# Patient Record
Sex: FEMALE | Race: WHITE | NOT HISPANIC OR LATINO | Employment: OTHER | URBAN - METROPOLITAN AREA
[De-identification: names, ages, dates, MRNs, and addresses within clinical notes are randomized per-mention and may not be internally consistent; named-entity substitution may affect disease eponyms.]

---

## 2017-06-19 RX ORDER — FUROSEMIDE 40 MG/1
TABLET ORAL
Qty: 180 TABLET | Refills: 0 | OUTPATIENT
Start: 2017-06-19

## 2017-07-07 RX ORDER — FUROSEMIDE 40 MG/1
TABLET ORAL
Qty: 180 TABLET | Refills: 0 | OUTPATIENT
Start: 2017-07-07

## 2017-08-17 ENCOUNTER — TRANSCRIBE ORDERS (OUTPATIENT)
Dept: ADMINISTRATIVE | Facility: HOSPITAL | Age: 82
End: 2017-08-17

## 2017-08-17 DIAGNOSIS — R06.00 DYSPNEA, UNSPECIFIED TYPE: Primary | ICD-10-CM

## 2017-08-21 ENCOUNTER — HOSPITAL ENCOUNTER (OUTPATIENT)
Dept: CARDIOLOGY | Facility: HOSPITAL | Age: 82
Discharge: HOME | End: 2017-08-21
Attending: INTERNAL MEDICINE

## 2017-08-21 ENCOUNTER — HOSPITAL ENCOUNTER (OUTPATIENT)
Dept: PULMONOLOGY | Facility: HOSPITAL | Age: 82
Discharge: HOME | End: 2017-08-21
Attending: INTERNAL MEDICINE | Admitting: INTERNAL MEDICINE

## 2017-08-21 ENCOUNTER — TRANSCRIBE ORDERS (OUTPATIENT)
Dept: ADMINISTRATIVE | Facility: HOSPITAL | Age: 82
End: 2017-08-21

## 2017-08-21 ENCOUNTER — HOSPITAL ENCOUNTER (OUTPATIENT)
Dept: GENERAL RADIOLOGY | Facility: HOSPITAL | Age: 82
Discharge: HOME | End: 2017-08-21
Attending: INTERNAL MEDICINE

## 2017-08-21 VITALS
SYSTOLIC BLOOD PRESSURE: 114 MMHG | HEART RATE: 71 BPM | BODY MASS INDEX: 29.86 KG/M2 | OXYGEN SATURATION: 92 % | DIASTOLIC BLOOD PRESSURE: 64 MMHG | WEIGHT: 197 LBS | HEIGHT: 68 IN

## 2017-08-21 VITALS — OXYGEN SATURATION: 94 % | HEART RATE: 74 BPM

## 2017-08-21 DIAGNOSIS — R06.00 DYSPNEA, UNSPECIFIED TYPE: Primary | ICD-10-CM

## 2017-08-21 DIAGNOSIS — R06.00 DYSPNEA, UNSPECIFIED TYPE: ICD-10-CM

## 2017-08-21 LAB
BH CV ECHO MEAS - ACS: 1.7 CM
BH CV ECHO MEAS - AO MAX PG (FULL): 2.3 MMHG
BH CV ECHO MEAS - AO MAX PG: 7.1 MMHG
BH CV ECHO MEAS - AO MEAN PG (FULL): 1 MMHG
BH CV ECHO MEAS - AO MEAN PG: 3 MMHG
BH CV ECHO MEAS - AO ROOT AREA (BSA CORRECTED): 1.7
BH CV ECHO MEAS - AO ROOT AREA: 9.6 CM^2
BH CV ECHO MEAS - AO ROOT DIAM: 3.5 CM
BH CV ECHO MEAS - AO V2 MAX: 133 CM/SEC
BH CV ECHO MEAS - AO V2 MEAN: 84 CM/SEC
BH CV ECHO MEAS - AO V2 VTI: 28 CM
BH CV ECHO MEAS - AVA(I,A): 2.7 CM^2
BH CV ECHO MEAS - AVA(I,D): 2.7 CM^2
BH CV ECHO MEAS - AVA(V,A): 2.6 CM^2
BH CV ECHO MEAS - AVA(V,D): 2.6 CM^2
BH CV ECHO MEAS - BSA(HAYCOCK): 2.1 M^2
BH CV ECHO MEAS - BSA: 2 M^2
BH CV ECHO MEAS - BZI_BMI: 30 KILOGRAMS/M^2
BH CV ECHO MEAS - BZI_METRIC_HEIGHT: 172.7 CM
BH CV ECHO MEAS - BZI_METRIC_WEIGHT: 89.4 KG
BH CV ECHO MEAS - CONTRAST EF (2CH): 58.4 ML/M^2
BH CV ECHO MEAS - CONTRAST EF 4CH: 61.1 ML/M^2
BH CV ECHO MEAS - EDV(CUBED): 144.7 ML
BH CV ECHO MEAS - EDV(MOD-SP2): 86.3 ML
BH CV ECHO MEAS - EDV(MOD-SP4): 92.2 ML
BH CV ECHO MEAS - EDV(TEICH): 132.4 ML
BH CV ECHO MEAS - EF(CUBED): 62.7 %
BH CV ECHO MEAS - EF(MOD-SP2): 58.4 %
BH CV ECHO MEAS - EF(MOD-SP4): 61.1 %
BH CV ECHO MEAS - EF(TEICH): 53.8 %
BH CV ECHO MEAS - ESV(CUBED): 54 ML
BH CV ECHO MEAS - ESV(MOD-SP2): 35.9 ML
BH CV ECHO MEAS - ESV(MOD-SP4): 35.9 ML
BH CV ECHO MEAS - ESV(TEICH): 61.2 ML
BH CV ECHO MEAS - FS: 28 %
BH CV ECHO MEAS - IVS/LVPW: 1.1
BH CV ECHO MEAS - IVSD: 0.69 CM
BH CV ECHO MEAS - LA DIMENSION: 3.9 CM
BH CV ECHO MEAS - LA/AO: 1.1
BH CV ECHO MEAS - LAT PEAK E' VEL: 9 CM/SEC
BH CV ECHO MEAS - LV DIASTOLIC VOL/BSA (35-75): 45.4 ML/M^2
BH CV ECHO MEAS - LV MASS(C)D: 117 GRAMS
BH CV ECHO MEAS - LV MASS(C)DI: 57.6 GRAMS/M^2
BH CV ECHO MEAS - LV MAX PG: 4.8 MMHG
BH CV ECHO MEAS - LV MEAN PG: 2 MMHG
BH CV ECHO MEAS - LV SYSTOLIC VOL/BSA (12-30): 17.7 ML/M^2
BH CV ECHO MEAS - LV V1 MAX: 109 CM/SEC
BH CV ECHO MEAS - LV V1 MEAN: 71.8 CM/SEC
BH CV ECHO MEAS - LV V1 VTI: 23.8 CM
BH CV ECHO MEAS - LVIDD: 5.3 CM
BH CV ECHO MEAS - LVIDS: 3.8 CM
BH CV ECHO MEAS - LVLD AP2: 8.2 CM
BH CV ECHO MEAS - LVLD AP4: 7.7 CM
BH CV ECHO MEAS - LVLS AP2: 7 CM
BH CV ECHO MEAS - LVLS AP4: 6.6 CM
BH CV ECHO MEAS - LVOT AREA (M): 3.1 CM^2
BH CV ECHO MEAS - LVOT AREA: 3.1 CM^2
BH CV ECHO MEAS - LVOT DIAM: 2 CM
BH CV ECHO MEAS - LVPWD: 0.64 CM
BH CV ECHO MEAS - MED PEAK E' VEL: 9 CM/SEC
BH CV ECHO MEAS - MR MAX PG: 38.9 MMHG
BH CV ECHO MEAS - MR MAX VEL: 312 CM/SEC
BH CV ECHO MEAS - MV A DUR: 0.18 SEC
BH CV ECHO MEAS - MV A MAX VEL: 87.3 CM/SEC
BH CV ECHO MEAS - MV DEC SLOPE: 593 CM/SEC^2
BH CV ECHO MEAS - MV DEC TIME: 0.24 SEC
BH CV ECHO MEAS - MV E MAX VEL: 116 CM/SEC
BH CV ECHO MEAS - MV E/A: 1.3
BH CV ECHO MEAS - MV MAX PG: 9.7 MMHG
BH CV ECHO MEAS - MV MEAN PG: 3 MMHG
BH CV ECHO MEAS - MV P1/2T MAX VEL: 148 CM/SEC
BH CV ECHO MEAS - MV P1/2T: 73.1 MSEC
BH CV ECHO MEAS - MV V2 MAX: 156 CM/SEC
BH CV ECHO MEAS - MV V2 MEAN: 80.2 CM/SEC
BH CV ECHO MEAS - MV V2 VTI: 41.6 CM
BH CV ECHO MEAS - MVA P1/2T LCG: 1.5 CM^2
BH CV ECHO MEAS - MVA(P1/2T): 3 CM^2
BH CV ECHO MEAS - MVA(VTI): 1.8 CM^2
BH CV ECHO MEAS - PA ACC TIME: 0.12 SEC
BH CV ECHO MEAS - PA MAX PG (FULL): 4.2 MMHG
BH CV ECHO MEAS - PA MAX PG: 5.9 MMHG
BH CV ECHO MEAS - PA PR(ACCEL): 25 MMHG
BH CV ECHO MEAS - PA V2 MAX: 121 CM/SEC
BH CV ECHO MEAS - PULM A REVS DUR: 0.22 SEC
BH CV ECHO MEAS - PULM A REVS VEL: 28.7 CM/SEC
BH CV ECHO MEAS - PULM DIAS VEL: 58.3 CM/SEC
BH CV ECHO MEAS - PULM S/D: 1
BH CV ECHO MEAS - PULM SYS VEL: 58.8 CM/SEC
BH CV ECHO MEAS - PVA(V,A): 2.2 CM^2
BH CV ECHO MEAS - PVA(V,D): 2.2 CM^2
BH CV ECHO MEAS - QP/QS: 0.71
BH CV ECHO MEAS - RAP SYSTOLE: 3 MMHG
BH CV ECHO MEAS - RV MAX PG: 1.7 MMHG
BH CV ECHO MEAS - RV MEAN PG: 1 MMHG
BH CV ECHO MEAS - RV V1 MAX: 64.3 CM/SEC
BH CV ECHO MEAS - RV V1 MEAN: 43.6 CM/SEC
BH CV ECHO MEAS - RV V1 VTI: 12.7 CM
BH CV ECHO MEAS - RVOT AREA: 4.2 CM^2
BH CV ECHO MEAS - RVOT DIAM: 2.3 CM
BH CV ECHO MEAS - RVSP: 35.5 MMHG
BH CV ECHO MEAS - SI(AO): 132.7 ML/M^2
BH CV ECHO MEAS - SI(CUBED): 44.7 ML/M^2
BH CV ECHO MEAS - SI(LVOT): 36.8 ML/M^2
BH CV ECHO MEAS - SI(MOD-SP2): 24.8 ML/M^2
BH CV ECHO MEAS - SI(MOD-SP4): 27.7 ML/M^2
BH CV ECHO MEAS - SI(TEICH): 35.1 ML/M^2
BH CV ECHO MEAS - SV(AO): 269.4 ML
BH CV ECHO MEAS - SV(CUBED): 90.7 ML
BH CV ECHO MEAS - SV(LVOT): 74.8 ML
BH CV ECHO MEAS - SV(MOD-SP2): 50.4 ML
BH CV ECHO MEAS - SV(MOD-SP4): 56.3 ML
BH CV ECHO MEAS - SV(RVOT): 52.8 ML
BH CV ECHO MEAS - SV(TEICH): 71.2 ML
BH CV ECHO MEAS - TAPSE (>1.6): 2.4 CM2
BH CV ECHO MEAS - TR MAX VEL: 285 CM/SEC
BH CV VAS BP RIGHT ARM: NORMAL MMHG
BH CV XLRA - RV BASE: 3.9 CM
BH CV XLRA - RV LENGTH: 7.1 CM
BH CV XLRA - RV MID: 3.5 CM
BH CV XLRA - TDI S': 11 CM/SEC
E/E' RATIO: 13
LEFT ATRIUM VOLUME INDEX: 31 ML/M2
LEFT ATRIUM VOLUME: 57 CM3
LV EF 2D ECHO EST: 61 %

## 2017-08-21 PROCEDURE — 63710000001 ALBUTEROL PER 1 MG: Performed by: INTERNAL MEDICINE

## 2017-08-21 PROCEDURE — 0399T HC MYOCARDL STRAIN IMAG QUAN ASSMT PER SESS: CPT

## 2017-08-21 PROCEDURE — 93306 TTE W/DOPPLER COMPLETE: CPT

## 2017-08-21 PROCEDURE — 94726 PLETHYSMOGRAPHY LUNG VOLUMES: CPT

## 2017-08-21 PROCEDURE — 94060 EVALUATION OF WHEEZING: CPT

## 2017-08-21 PROCEDURE — 0399T ADULT TRANSTHORACIC ECHO COMPLETE: CPT | Performed by: INTERNAL MEDICINE

## 2017-08-21 PROCEDURE — 93306 TTE W/DOPPLER COMPLETE: CPT | Performed by: INTERNAL MEDICINE

## 2017-08-21 PROCEDURE — A9270 NON-COVERED ITEM OR SERVICE: HCPCS | Performed by: INTERNAL MEDICINE

## 2017-08-21 PROCEDURE — 71020 HC CHEST PA AND LATERAL: CPT

## 2017-08-21 RX ORDER — CITALOPRAM 20 MG/1
20 TABLET ORAL DAILY
COMMUNITY

## 2017-08-21 RX ORDER — IPRATROPIUM BROMIDE AND ALBUTEROL SULFATE 2.5; .5 MG/3ML; MG/3ML
3 SOLUTION RESPIRATORY (INHALATION)
COMMUNITY
End: 2019-05-29 | Stop reason: ALTCHOICE

## 2017-08-21 RX ORDER — SENNOSIDES 8.6 MG
650 CAPSULE ORAL EVERY 8 HOURS PRN
COMMUNITY

## 2017-08-21 RX ORDER — ALBUTEROL SULFATE 2.5 MG/3ML
2.5 SOLUTION RESPIRATORY (INHALATION) EVERY 4 HOURS PRN
COMMUNITY
End: 2019-05-29 | Stop reason: ALTCHOICE

## 2017-08-21 RX ORDER — GUAIFENESIN 400 MG/1
400 TABLET ORAL
COMMUNITY
End: 2019-05-29 | Stop reason: ALTCHOICE

## 2017-08-21 RX ORDER — ALBUTEROL SULFATE 2.5 MG/3ML
2.5 SOLUTION RESPIRATORY (INHALATION) ONCE
Status: COMPLETED | OUTPATIENT
Start: 2017-08-21 | End: 2017-08-21

## 2017-08-21 RX ORDER — BUDESONIDE 1 MG/2ML
1 INHALANT ORAL 2 TIMES DAILY
COMMUNITY
End: 2019-05-29 | Stop reason: ALTCHOICE

## 2017-08-21 RX ADMIN — ALBUTEROL SULFATE 2.5 MG: 2.5 SOLUTION RESPIRATORY (INHALATION) at 13:16

## 2018-05-03 ENCOUNTER — OFFICE VISIT (OUTPATIENT)
Dept: CARDIOLOGY | Facility: CLINIC | Age: 83
End: 2018-05-03

## 2018-05-03 VITALS
HEIGHT: 68 IN | DIASTOLIC BLOOD PRESSURE: 80 MMHG | BODY MASS INDEX: 30.77 KG/M2 | WEIGHT: 203 LBS | SYSTOLIC BLOOD PRESSURE: 120 MMHG | HEART RATE: 75 BPM

## 2018-05-03 DIAGNOSIS — R06.02 SOB (SHORTNESS OF BREATH): ICD-10-CM

## 2018-05-03 DIAGNOSIS — I25.118 CORONARY ARTERY DISEASE INVOLVING NATIVE CORONARY ARTERY OF NATIVE HEART WITH OTHER FORM OF ANGINA PECTORIS (HCC): ICD-10-CM

## 2018-05-03 DIAGNOSIS — I10 ESSENTIAL HYPERTENSION: ICD-10-CM

## 2018-05-03 DIAGNOSIS — R07.2 PRECORDIAL PAIN: Primary | ICD-10-CM

## 2018-05-03 PROCEDURE — 93000 ELECTROCARDIOGRAM COMPLETE: CPT | Performed by: INTERNAL MEDICINE

## 2018-05-03 PROCEDURE — 99204 OFFICE O/P NEW MOD 45 MIN: CPT | Performed by: INTERNAL MEDICINE

## 2018-05-03 NOTE — PROGRESS NOTES
Subjective:     Encounter Date:05/03/2018      Patient ID: Eduarda Lopez is a 86 y.o. female.    Chief Complaint: SOB, CP  History of Present Illness    Dear Lisseth,    I had the pleasure seeing this patient in the office today for initial evaluation and consultation.  I appreciate you sent her to see us.    She has a history of CAD and heart failure.  Apparently I saw her many years ago, although I do not have any records in no independent recollection of her history.  She apparently saw Dr. Moeller also years ago.  We currently do not have any of those records.    She's been having episodes of increasing shortness breath with fairly minimal activity.  She walks with a walker.  She walks very slowly.  She has not had any recent hospitalizations or evaluations.  Sometimes she does get mid precordial chest discomfort when she gets the shortness of breath.  If she gets chest discomfort it does not radiate.  No associated nausea or vomiting.  No associated diaphoresis.  She has not had any sensation of palpitations or heart racing.  No presyncope or syncope.  She has had episodes of dizziness when she stands up fast.  She is also noted some lower extremity edema.  It's better when she keeps her feet propped up most of the day.  Recently her assist chair was broken so she could not elevate her feet and by the end of the afternoon she would have fairly significant lower extremity edema.  She's had very low fatigue.    Last year she had an echocardiogram performed that showed diastolic dysfunction with grade 2 diastolic dysfunction.  Systolic function was normal.  She doesn't recall why that echocardiogram was ordered.    The following portions of the patient's history were reviewed and updated as appropriate: allergies, current medications, past family history, past medical history, past social history, past surgical history and problem list.    Past Medical History:   Diagnosis Date   • Asthma    • CHF (congestive  heart failure)    • Disease of thyroid gland    • Fracture of hip    • Hyperlipidemia    • Hypertension    • Thyroid disease        Past Surgical History:   Procedure Laterality Date   • CHOLECYSTECTOMY     • HIP SURGERY     • HYSTERECTOMY     • JOINT REPLACEMENT     • SHOULDER SURGERY     • THYROID SURGERY     • TONSILLECTOMY         Social History     Social History   • Marital status:      Spouse name: N/A   • Number of children: N/A   • Years of education: N/A     Occupational History   • Not on file.     Social History Main Topics   • Smoking status: Former Smoker   • Smokeless tobacco: Not on file      Comment: caff use    • Alcohol use No   • Drug use: No   • Sexual activity: Not on file     Other Topics Concern   • Not on file     Social History Narrative   • No narrative on file       Review of Systems   Constitution: Positive for weakness and malaise/fatigue. Negative for chills, decreased appetite, fever and night sweats.   HENT: Negative for ear discharge, ear pain, hearing loss, nosebleeds and sore throat.    Eyes: Negative for blurred vision, double vision and pain.   Cardiovascular: Negative for cyanosis.   Respiratory: Negative for hemoptysis and sputum production.    Endocrine: Negative for cold intolerance and heat intolerance.   Hematologic/Lymphatic: Negative for adenopathy.   Skin: Negative for dry skin, itching, nail changes, rash and suspicious lesions.   Musculoskeletal: Positive for back pain and joint pain. Negative for arthritis, gout, muscle cramps, muscle weakness, myalgias and neck pain.   Gastrointestinal: Negative for anorexia, bowel incontinence, constipation, diarrhea, dysphagia, hematemesis and jaundice.   Genitourinary: Negative for bladder incontinence, dysuria, flank pain, frequency, hematuria and nocturia.   Neurological: Negative for focal weakness, numbness, paresthesias and seizures.   Psychiatric/Behavioral: Positive for memory loss. Negative for altered mental  "status, hallucinations, hypervigilance, suicidal ideas and thoughts of violence.   Allergic/Immunologic: Negative for persistent infections.         ECG 12 Lead  Date/Time: 5/3/2018 12:29 PM  Performed by: DI LUONG III.  Authorized by: DI LUONG III   Comparison: compared with previous ECG   Similar to previous ECG  Rhythm: sinus rhythm  Rate: normal  Conduction: conduction normal  ST Segments: ST segments normal  T Waves: T waves normal  QRS axis: normal  Other: no other findings  Clinical impression: normal ECG               Objective:     Vitals:    05/03/18 1105   BP: 120/80   Pulse: 75   Weight: 92.1 kg (203 lb)   Height: 172.7 cm (67.99\")         Physical Exam   Constitutional: She is oriented to person, place, and time. She appears well-developed and well-nourished. No distress.   HENT:   Head: Normocephalic and atraumatic.   Nose: Nose normal.   Mouth/Throat: Oropharynx is clear and moist.   Eyes: Conjunctivae and EOM are normal. Pupils are equal, round, and reactive to light. Right eye exhibits no discharge. Left eye exhibits no discharge.   Neck: Normal range of motion. Neck supple. No tracheal deviation present. No thyromegaly present.   Cardiovascular: Normal rate, regular rhythm, S1 normal, S2 normal, normal heart sounds and normal pulses.  Exam reveals no S3.    Pulmonary/Chest: Effort normal and breath sounds normal. No stridor. No respiratory distress. She exhibits no tenderness.   Abdominal: Soft. Bowel sounds are normal. She exhibits no distension and no mass. There is no tenderness. There is no rebound and no guarding.   Musculoskeletal: Normal range of motion. She exhibits no tenderness or deformity.   Lymphadenopathy:     She has no cervical adenopathy.   Neurological: She is alert and oriented to person, place, and time. She has normal reflexes.   Skin: Skin is warm and dry. No rash noted. She is not diaphoretic. No erythema.   Psychiatric: She has a normal mood and affect. Thought " content normal.       Lab Review:             Performed        Assessment:          Diagnosis Plan   1. Precordial pain  Adult Transthoracic Echo Complete W/ Cont if Necessary Per Protocol    Stress Test With Myocardial Perfusion One Day   2. SOB (shortness of breath)  Adult Transthoracic Echo Complete W/ Cont if Necessary Per Protocol    Stress Test With Myocardial Perfusion One Day   3. Coronary artery disease involving native coronary artery of native heart with other form of angina pectoris  Adult Transthoracic Echo Complete W/ Cont if Necessary Per Protocol    Stress Test With Myocardial Perfusion One Day   4. Essential hypertension            Plan:       As patient is having exertional chest pain and exertional shortness of breath.  She's also having lower extremity edema.  She has a prior history of heart failure and CAD, details undefined.  We will arrange for an echocardiogram as well as a Lexiscan Cardiolite.  Further evaluation and treatment will be predicated on the results.  Thank you very much for allowing us to participate in the care of this pleasant patient.  Please don't hesitate to call if I can be of assistance in any way.      Current Outpatient Prescriptions:   •  acetaminophen (TYLENOL) 650 MG 8 hr tablet, Take 650 mg by mouth Every 8 (Eight) Hours As Needed for Mild Pain ., Disp: , Rfl:   •  aspirin 81 MG tablet, Take 81 mg by mouth Daily., Disp: , Rfl:   •  budesonide-formoterol (SYMBICORT) 160-4.5 MCG/ACT inhaler, 2 (two) times a day., Disp: , Rfl:   •  Cetirizine HCl 10 MG capsule, Take 10 mg by mouth Daily., Disp: , Rfl:   •  citalopram (CeleXA) 20 MG tablet, Take 20 mg by mouth Daily., Disp: , Rfl:   •  clonazePAM (KlonoPIN) 0.5 MG tablet, Take 0.5 mg by mouth 2 (Two) Times a Day As Needed., Disp: , Rfl:   •  famotidine (PEPCID) 40 MG tablet, Take 40 mg by mouth Daily., Disp: , Rfl:   •  Formoterol Fumarate (PERFOROMIST IN), Inhale., Disp: , Rfl:   •  furosemide (LASIX) 40 MG tablet,  Take 1 tablet by mouth 2 (Two) Times a Day. (Patient taking differently: Take 40 mg by mouth 2 (Two) Times a Day. 1 tab AM and 0.5 tab at 2 PM daily), Disp: 180 tablet, Rfl: 1  •  levothyroxine (SYNTHROID, LEVOTHROID) 50 MCG tablet, Take 75 mcg by mouth Daily., Disp: , Rfl:   •  Multiple Vitamin (MULTI VITAMIN DAILY PO), Take 1 tablet by mouth Daily., Disp: , Rfl:   •  NIFEdipine CC (ADALAT CC) 30 MG 24 hr tablet, Take 30 mg by mouth Daily., Disp: , Rfl:   •  traMADol (ULTRAM) 50 MG tablet, Take 50 mg by mouth 3 (Three) Times a Day., Disp: , Rfl:   •  albuterol (PROVENTIL) (2.5 MG/3ML) 0.083% nebulizer solution, Take 2.5 mg by nebulization Every 4 (Four) Hours As Needed for Wheezing., Disp: , Rfl:   •  budesonide (PULMICORT) 1 MG/2ML nebulizer solution, Take 1 mg by nebulization 2 (Two) Times a Day., Disp: , Rfl:   •  Calcium Carbonate-Vitamin D (OSCAL 500/200 D-3 PO), Take by mouth., Disp: , Rfl:   •  diclofenac (VOLTAREN) 1 % gel gel, Apply 4 g topically 4 (Four) Times a Day As Needed (2 gram for  upper extremities and 4 grams for lower extremities)., Disp: , Rfl:   •  fluticasone (FLONASE) 50 MCG/ACT nasal spray, into each nostril daily., Disp: , Rfl:   •  gabapentin (NEURONTIN) 300 MG capsule, Take by mouth., Disp: , Rfl:   •  guaiFENesin (HUMIBID 3) 400 MG tablet, Take 400 mg by mouth Every 4 (Four) Hours., Disp: , Rfl:   •  ipratropium-albuterol (DUO-NEB) 0.5-2.5 mg/mL nebulizer, Take 3 mL by nebulization 4 (Four) Times a Day., Disp: , Rfl:   •  LORazepam (ATIVAN) 0.5 MG tablet, Take 0.5 mg by mouth every 8 (eight) hours as needed for anxiety., Disp: , Rfl:   •  Sennosides (SENNA) 8.6 MG capsule, Take by mouth 2 (two) times a day., Disp: , Rfl:   •  Tiotropium Bromide Monohydrate 2.5 MCG/ACT aerosol solution, daily., Disp: , Rfl:   •  traZODone (DESYREL) 50 MG tablet, , Disp: , Rfl:

## 2018-06-06 ENCOUNTER — HOSPITAL ENCOUNTER (OUTPATIENT)
Dept: CARDIOLOGY | Facility: HOSPITAL | Age: 83
Discharge: HOME | End: 2018-06-06
Attending: INTERNAL MEDICINE | Admitting: INTERNAL MEDICINE

## 2018-06-06 VITALS
WEIGHT: 203 LBS | HEART RATE: 73 BPM | DIASTOLIC BLOOD PRESSURE: 62 MMHG | BODY MASS INDEX: 31.86 KG/M2 | HEIGHT: 67 IN | SYSTOLIC BLOOD PRESSURE: 112 MMHG

## 2018-06-06 DIAGNOSIS — R06.02 SOB (SHORTNESS OF BREATH): ICD-10-CM

## 2018-06-06 DIAGNOSIS — I25.118 CORONARY ARTERY DISEASE INVOLVING NATIVE CORONARY ARTERY OF NATIVE HEART WITH OTHER FORM OF ANGINA PECTORIS (HCC): ICD-10-CM

## 2018-06-06 DIAGNOSIS — R07.2 PRECORDIAL PAIN: ICD-10-CM

## 2018-06-06 LAB
AORTIC DIMENSIONLESS INDEX: 0.9 (DI)
ASCENDING AORTA: 2.9 CM
BH CV ECHO MEAS - ACS: 1.9 CM
BH CV ECHO MEAS - AO MAX PG (FULL): 2.1 MMHG
BH CV ECHO MEAS - AO MAX PG: 8.1 MMHG
BH CV ECHO MEAS - AO MEAN PG (FULL): 1 MMHG
BH CV ECHO MEAS - AO MEAN PG: 5 MMHG
BH CV ECHO MEAS - AO ROOT AREA (BSA CORRECTED): 1.7
BH CV ECHO MEAS - AO ROOT AREA: 9.4 CM^2
BH CV ECHO MEAS - AO ROOT DIAM: 3.5 CM
BH CV ECHO MEAS - AO V2 MAX: 142 CM/SEC
BH CV ECHO MEAS - AO V2 MEAN: 106 CM/SEC
BH CV ECHO MEAS - AO V2 VTI: 30.7 CM
BH CV ECHO MEAS - ASC AORTA: 2.9 CM
BH CV ECHO MEAS - AVA(I,A): 3.2 CM^2
BH CV ECHO MEAS - AVA(I,D): 3.2 CM^2
BH CV ECHO MEAS - AVA(V,A): 3 CM^2
BH CV ECHO MEAS - AVA(V,D): 3 CM^2
BH CV ECHO MEAS - BSA(HAYCOCK): 2.1 M^2
BH CV ECHO MEAS - BSA: 2 M^2
BH CV ECHO MEAS - BZI_BMI: 31.8 KILOGRAMS/M^2
BH CV ECHO MEAS - BZI_METRIC_HEIGHT: 170.2 CM
BH CV ECHO MEAS - BZI_METRIC_WEIGHT: 92.1 KG
BH CV ECHO MEAS - CONTRAST EF (2CH): 60.4 ML/M^2
BH CV ECHO MEAS - CONTRAST EF 4CH: 52.4 ML/M^2
BH CV ECHO MEAS - EDV(CUBED): 240.1 ML
BH CV ECHO MEAS - EDV(MOD-SP2): 112 ML
BH CV ECHO MEAS - EDV(MOD-SP4): 123 ML
BH CV ECHO MEAS - EDV(TEICH): 195.1 ML
BH CV ECHO MEAS - EF(CUBED): 83.9 %
BH CV ECHO MEAS - EF(MOD-BP): 52 %
BH CV ECHO MEAS - EF(MOD-SP2): 60.4 %
BH CV ECHO MEAS - EF(MOD-SP4): 52.4 %
BH CV ECHO MEAS - EF(TEICH): 76 %
BH CV ECHO MEAS - ESV(CUBED): 38.6 ML
BH CV ECHO MEAS - ESV(MOD-SP2): 44.4 ML
BH CV ECHO MEAS - ESV(MOD-SP4): 58.5 ML
BH CV ECHO MEAS - ESV(TEICH): 46.8 ML
BH CV ECHO MEAS - FS: 45.6 %
BH CV ECHO MEAS - IVS/LVPW: 1.2
BH CV ECHO MEAS - IVSD: 1.2 CM
BH CV ECHO MEAS - LAT PEAK E' VEL: 9 CM/SEC
BH CV ECHO MEAS - LV DIASTOLIC VOL/BSA (35-75): 60.4 ML/M^2
BH CV ECHO MEAS - LV MASS(C)D: 291.9 GRAMS
BH CV ECHO MEAS - LV MASS(C)DI: 143.4 GRAMS/M^2
BH CV ECHO MEAS - LV MAX PG: 6 MMHG
BH CV ECHO MEAS - LV MEAN PG: 4 MMHG
BH CV ECHO MEAS - LV SYSTOLIC VOL/BSA (12-30): 28.7 ML/M^2
BH CV ECHO MEAS - LV V1 MAX: 122 CM/SEC
BH CV ECHO MEAS - LV V1 MEAN: 95.1 CM/SEC
BH CV ECHO MEAS - LV V1 VTI: 28.4 CM
BH CV ECHO MEAS - LVIDD: 6.2 CM
BH CV ECHO MEAS - LVIDS: 3.4 CM
BH CV ECHO MEAS - LVLD AP2: 7.8 CM
BH CV ECHO MEAS - LVLD AP4: 7.6 CM
BH CV ECHO MEAS - LVLS AP2: 6.4 CM
BH CV ECHO MEAS - LVLS AP4: 6.3 CM
BH CV ECHO MEAS - LVOT AREA (M): 3.5 CM^2
BH CV ECHO MEAS - LVOT AREA: 3.5 CM^2
BH CV ECHO MEAS - LVOT DIAM: 2.1 CM
BH CV ECHO MEAS - LVPWD: 0.98 CM
BH CV ECHO MEAS - MED PEAK E' VEL: 5 CM/SEC
BH CV ECHO MEAS - MV A DUR: 0.11 SEC
BH CV ECHO MEAS - MV A MAX VEL: 103 CM/SEC
BH CV ECHO MEAS - MV DEC SLOPE: 516 CM/SEC^2
BH CV ECHO MEAS - MV DEC TIME: 0.29 SEC
BH CV ECHO MEAS - MV E MAX VEL: 69.5 CM/SEC
BH CV ECHO MEAS - MV E/A: 0.67
BH CV ECHO MEAS - MV MAX PG: 5.3 MMHG
BH CV ECHO MEAS - MV MEAN PG: 3 MMHG
BH CV ECHO MEAS - MV P1/2T MAX VEL: 112 CM/SEC
BH CV ECHO MEAS - MV P1/2T: 63.6 MSEC
BH CV ECHO MEAS - MV V2 MAX: 115 CM/SEC
BH CV ECHO MEAS - MV V2 MEAN: 86.2 CM/SEC
BH CV ECHO MEAS - MV V2 VTI: 35.4 CM
BH CV ECHO MEAS - MVA P1/2T LCG: 2 CM^2
BH CV ECHO MEAS - MVA(P1/2T): 3.5 CM^2
BH CV ECHO MEAS - MVA(VTI): 2.8 CM^2
BH CV ECHO MEAS - PA ACC TIME: 0.11 SEC
BH CV ECHO MEAS - PA MAX PG (FULL): 1.3 MMHG
BH CV ECHO MEAS - PA MAX PG: 4.2 MMHG
BH CV ECHO MEAS - PA PR(ACCEL): 31.3 MMHG
BH CV ECHO MEAS - PA V2 MAX: 103 CM/SEC
BH CV ECHO MEAS - PULM A REVS DUR: 0.1 SEC
BH CV ECHO MEAS - PULM A REVS VEL: 21 CM/SEC
BH CV ECHO MEAS - PULM DIAS VEL: 49.5 CM/SEC
BH CV ECHO MEAS - PULM S/D: 1.3
BH CV ECHO MEAS - PULM SYS VEL: 62.6 CM/SEC
BH CV ECHO MEAS - PVA(V,A): 2.1 CM^2
BH CV ECHO MEAS - PVA(V,D): 2.1 CM^2
BH CV ECHO MEAS - QP/QS: 0.46
BH CV ECHO MEAS - RAP SYSTOLE: 3 MMHG
BH CV ECHO MEAS - RV MAX PG: 3 MMHG
BH CV ECHO MEAS - RV MEAN PG: 2 MMHG
BH CV ECHO MEAS - RV V1 MAX: 86.5 CM/SEC
BH CV ECHO MEAS - RV V1 MEAN: 67.3 CM/SEC
BH CV ECHO MEAS - RV V1 VTI: 17.7 CM
BH CV ECHO MEAS - RVOT AREA: 2.5 CM^2
BH CV ECHO MEAS - RVOT DIAM: 1.8 CM
BH CV ECHO MEAS - RVSP: 25 MMHG
BH CV ECHO MEAS - SI(AO): 142.4 ML/M^2
BH CV ECHO MEAS - SI(CUBED): 99 ML/M^2
BH CV ECHO MEAS - SI(LVOT): 48.3 ML/M^2
BH CV ECHO MEAS - SI(MOD-SP2): 33.2 ML/M^2
BH CV ECHO MEAS - SI(MOD-SP4): 31.7 ML/M^2
BH CV ECHO MEAS - SI(TEICH): 72.9 ML/M^2
BH CV ECHO MEAS - SUP REN AO DIAM: 2 CM
BH CV ECHO MEAS - SV(AO): 289.8 ML
BH CV ECHO MEAS - SV(CUBED): 201.4 ML
BH CV ECHO MEAS - SV(LVOT): 98.4 ML
BH CV ECHO MEAS - SV(MOD-SP2): 67.6 ML
BH CV ECHO MEAS - SV(MOD-SP4): 64.5 ML
BH CV ECHO MEAS - SV(RVOT): 45 ML
BH CV ECHO MEAS - SV(TEICH): 148.3 ML
BH CV ECHO MEAS - TAPSE (>1.6): 2.5 CM2
BH CV ECHO MEAS - TR MAX VEL: 233 CM/SEC
BH CV ECHO MEASUREMENTS AVERAGE E/E' RATIO: 9.93
BH CV VAS BP RIGHT ARM: NORMAL MMHG
BH CV XLRA - RV BASE: 3.4 CM
BH CV XLRA - TDI S': 11.2 CM/SEC
LEFT ATRIUM VOLUME INDEX: 26 ML/M2
MAXIMAL PREDICTED HEART RATE: 134 BPM
STRESS TARGET HR: 114 BPM

## 2018-06-06 PROCEDURE — 93306 TTE W/DOPPLER COMPLETE: CPT

## 2018-06-06 PROCEDURE — 25010000002 PERFLUTREN 6.52 MG/ML SUSPENSION: Performed by: INTERNAL MEDICINE

## 2018-06-06 PROCEDURE — 93306 TTE W/DOPPLER COMPLETE: CPT | Performed by: INTERNAL MEDICINE

## 2018-06-06 RX ADMIN — PERFLUTREN 13.04 MG: 6.52 INJECTION, SUSPENSION INTRAVENOUS at 14:16

## 2018-06-20 ENCOUNTER — HOSPITAL ENCOUNTER (OUTPATIENT)
Dept: NUCLEAR MEDICINE | Facility: HOSPITAL | Age: 83
Discharge: HOME | End: 2018-06-20
Attending: INTERNAL MEDICINE

## 2018-06-20 ENCOUNTER — HOSPITAL ENCOUNTER (OUTPATIENT)
Dept: CARDIOLOGY | Facility: HOSPITAL | Age: 83
Discharge: HOME | End: 2018-06-20
Attending: INTERNAL MEDICINE

## 2018-06-20 DIAGNOSIS — R07.2 PRECORDIAL PAIN: ICD-10-CM

## 2018-06-20 DIAGNOSIS — I25.118 CORONARY ARTERY DISEASE INVOLVING NATIVE CORONARY ARTERY OF NATIVE HEART WITH OTHER FORM OF ANGINA PECTORIS (HCC): ICD-10-CM

## 2018-06-20 DIAGNOSIS — R06.02 SOB (SHORTNESS OF BREATH): ICD-10-CM

## 2018-06-20 LAB
BH CV NUCLEAR PRIOR STUDY: 3
BH CV STRESS BP STAGE 1: NORMAL
BH CV STRESS COMMENTS STAGE 1: NORMAL
BH CV STRESS DOSE REGADENOSON STAGE 1: 0.4
BH CV STRESS DURATION MIN STAGE 1: 0
BH CV STRESS DURATION SEC STAGE 1: 30
BH CV STRESS HR STAGE 1: 76
BH CV STRESS O2 STAGE 1: 95
BH CV STRESS PROTOCOL 1: NORMAL
BH CV STRESS RECOVERY BP: NORMAL MMHG
BH CV STRESS RECOVERY HR: 83 BPM
BH CV STRESS RECOVERY O2: 98 %
BH CV STRESS STAGE 1: 1
LV EF NUC BP: 70 %
MAXIMAL PREDICTED HEART RATE: 134 BPM
PERCENT MAX PREDICTED HR: 67.16 %
STRESS BASELINE BP: NORMAL MMHG
STRESS BASELINE HR: 76 BPM
STRESS O2 SAT REST: 94 %
STRESS PERCENT HR: 79 %
STRESS POST ESTIMATED WORKLOAD: 1 METS
STRESS POST EXERCISE DUR SEC: 30 SEC
STRESS POST O2 SAT PEAK: 98 %
STRESS POST PEAK BP: NORMAL MMHG
STRESS POST PEAK HR: 90 BPM
STRESS TARGET HR: 114 BPM

## 2018-06-20 PROCEDURE — 0 TECHNETIUM SESTAMIBI: Performed by: INTERNAL MEDICINE

## 2018-06-20 PROCEDURE — A9500 TC99M SESTAMIBI: HCPCS | Performed by: INTERNAL MEDICINE

## 2018-06-20 PROCEDURE — 25010000002 REGADENOSON 0.4 MG/5ML SOLUTION: Performed by: INTERNAL MEDICINE

## 2018-06-20 PROCEDURE — 93016 CV STRESS TEST SUPVJ ONLY: CPT | Performed by: INTERNAL MEDICINE

## 2018-06-20 PROCEDURE — 93018 CV STRESS TEST I&R ONLY: CPT | Performed by: INTERNAL MEDICINE

## 2018-06-20 PROCEDURE — 78452 HT MUSCLE IMAGE SPECT MULT: CPT

## 2018-06-20 PROCEDURE — 78452 HT MUSCLE IMAGE SPECT MULT: CPT | Performed by: INTERNAL MEDICINE

## 2018-06-20 PROCEDURE — 93017 CV STRESS TEST TRACING ONLY: CPT

## 2018-06-20 RX ADMIN — TECHNETIUM TC 99M SESTAMIBI 1 DOSE: 1 INJECTION INTRAVENOUS at 09:30

## 2018-06-20 RX ADMIN — REGADENOSON 0.4 MG: 0.08 INJECTION, SOLUTION INTRAVENOUS at 09:30

## 2018-06-20 RX ADMIN — TECHNETIUM TC 99M SESTAMIBI 1 DOSE: 1 INJECTION INTRAVENOUS at 08:00

## 2018-06-26 ENCOUNTER — TELEPHONE (OUTPATIENT)
Dept: CARDIOLOGY | Facility: CLINIC | Age: 83
End: 2018-06-26

## 2018-09-28 ENCOUNTER — APPOINTMENT (OUTPATIENT)
Dept: GENERAL RADIOLOGY | Facility: HOSPITAL | Age: 83
End: 2018-09-28

## 2018-09-28 ENCOUNTER — HOSPITAL ENCOUNTER (EMERGENCY)
Facility: HOSPITAL | Age: 83
Discharge: HOME | End: 2018-09-28
Attending: EMERGENCY MEDICINE | Admitting: EMERGENCY MEDICINE

## 2018-09-28 VITALS
RESPIRATION RATE: 20 BRPM | WEIGHT: 196.6 LBS | SYSTOLIC BLOOD PRESSURE: 119 MMHG | BODY MASS INDEX: 29.8 KG/M2 | HEART RATE: 81 BPM | OXYGEN SATURATION: 91 % | DIASTOLIC BLOOD PRESSURE: 70 MMHG | HEIGHT: 68 IN | TEMPERATURE: 99.2 F

## 2018-09-28 DIAGNOSIS — J44.0 ACUTE BRONCHITIS WITH CHRONIC OBSTRUCTIVE PULMONARY DISEASE (COPD) (HCC): Primary | ICD-10-CM

## 2018-09-28 DIAGNOSIS — J20.9 ACUTE BRONCHITIS WITH CHRONIC OBSTRUCTIVE PULMONARY DISEASE (COPD) (HCC): Primary | ICD-10-CM

## 2018-09-28 LAB
ALBUMIN SERPL-MCNC: 4.5 G/DL (ref 3.5–5.2)
ALBUMIN/GLOB SERPL: 1.3 G/DL
ALP SERPL-CCNC: 72 U/L (ref 40–129)
ALT SERPL W P-5'-P-CCNC: 14 U/L (ref 5–33)
ANION GAP SERPL CALCULATED.3IONS-SCNC: 13.7 MMOL/L
AST SERPL-CCNC: 26 U/L (ref 5–32)
BASOPHILS # BLD AUTO: 0.02 10*3/MM3 (ref 0–0.2)
BASOPHILS NFR BLD AUTO: 0.3 % (ref 0–2)
BILIRUB SERPL-MCNC: 0.7 MG/DL (ref 0.2–1.2)
BUN BLD-MCNC: 9 MG/DL (ref 8–23)
BUN/CREAT SERPL: 12 (ref 7–25)
CALCIUM SPEC-SCNC: 9.4 MG/DL (ref 8.8–10.5)
CHLORIDE SERPL-SCNC: 93 MMOL/L (ref 98–107)
CO2 SERPL-SCNC: 28.3 MMOL/L (ref 22–29)
CREAT BLD-MCNC: 0.75 MG/DL (ref 0.57–1)
DEPRECATED RDW RBC AUTO: 44.9 FL (ref 37–54)
EOSINOPHIL # BLD AUTO: 0.25 10*3/MM3 (ref 0.1–0.3)
EOSINOPHIL NFR BLD AUTO: 3.7 % (ref 0–4)
ERYTHROCYTE [DISTWIDTH] IN BLOOD BY AUTOMATED COUNT: 13.2 % (ref 11.5–14.5)
GFR SERPL CREATININE-BSD FRML MDRD: 73 ML/MIN/1.73
GLOBULIN UR ELPH-MCNC: 3.5 GM/DL
GLUCOSE BLD-MCNC: 141 MG/DL (ref 65–99)
HCT VFR BLD AUTO: 40.5 % (ref 37–47)
HGB BLD-MCNC: 13.1 G/DL (ref 12–16)
IMM GRANULOCYTES # BLD: 0.01 10*3/MM3 (ref 0–0.03)
IMM GRANULOCYTES NFR BLD: 0.1 % (ref 0–0.5)
LYMPHOCYTES # BLD AUTO: 1.18 10*3/MM3 (ref 0.6–4.8)
LYMPHOCYTES NFR BLD AUTO: 17.5 % (ref 20–45)
MCH RBC QN AUTO: 29.7 PG (ref 27–31)
MCHC RBC AUTO-ENTMCNC: 32.3 G/DL (ref 31–37)
MCV RBC AUTO: 91.8 FL (ref 81–99)
MONOCYTES # BLD AUTO: 0.49 10*3/MM3 (ref 0–1)
MONOCYTES NFR BLD AUTO: 7.3 % (ref 3–8)
NEUTROPHILS # BLD AUTO: 4.79 10*3/MM3 (ref 1.5–8.3)
NEUTROPHILS NFR BLD AUTO: 71.1 % (ref 45–70)
NRBC BLD MANUAL-RTO: 0 /100 WBC (ref 0–0)
NT-PROBNP SERPL-MCNC: 479.8 PG/ML (ref 5–450)
PLATELET # BLD AUTO: 277 10*3/MM3 (ref 140–500)
PMV BLD AUTO: 9.2 FL (ref 7.4–10.4)
POTASSIUM BLD-SCNC: 3.5 MMOL/L (ref 3.5–5.2)
PROT SERPL-MCNC: 8 G/DL (ref 6–8.5)
RBC # BLD AUTO: 4.41 10*6/MM3 (ref 4.2–5.4)
SODIUM BLD-SCNC: 135 MMOL/L (ref 136–145)
TROPONIN T SERPL-MCNC: <0.01 NG/ML (ref 0–0.03)
WBC NRBC COR # BLD: 6.74 10*3/MM3 (ref 4.8–10.8)

## 2018-09-28 PROCEDURE — 99284 EMERGENCY DEPT VISIT MOD MDM: CPT

## 2018-09-28 PROCEDURE — 80053 COMPREHEN METABOLIC PANEL: CPT | Performed by: EMERGENCY MEDICINE

## 2018-09-28 PROCEDURE — 93010 ELECTROCARDIOGRAM REPORT: CPT | Performed by: INTERNAL MEDICINE

## 2018-09-28 PROCEDURE — 25010000002 METHYLPREDNISOLONE PER 125 MG: Performed by: EMERGENCY MEDICINE

## 2018-09-28 PROCEDURE — 83880 ASSAY OF NATRIURETIC PEPTIDE: CPT | Performed by: EMERGENCY MEDICINE

## 2018-09-28 PROCEDURE — 96374 THER/PROPH/DIAG INJ IV PUSH: CPT

## 2018-09-28 PROCEDURE — 94640 AIRWAY INHALATION TREATMENT: CPT

## 2018-09-28 PROCEDURE — 93005 ELECTROCARDIOGRAM TRACING: CPT | Performed by: EMERGENCY MEDICINE

## 2018-09-28 PROCEDURE — 85025 COMPLETE CBC W/AUTO DIFF WBC: CPT | Performed by: EMERGENCY MEDICINE

## 2018-09-28 PROCEDURE — 99284 EMERGENCY DEPT VISIT MOD MDM: CPT | Performed by: EMERGENCY MEDICINE

## 2018-09-28 PROCEDURE — 84484 ASSAY OF TROPONIN QUANT: CPT | Performed by: EMERGENCY MEDICINE

## 2018-09-28 PROCEDURE — 71046 X-RAY EXAM CHEST 2 VIEWS: CPT

## 2018-09-28 RX ORDER — AZITHROMYCIN 250 MG/1
500 TABLET, FILM COATED ORAL ONCE
Status: COMPLETED | OUTPATIENT
Start: 2018-09-28 | End: 2018-09-28

## 2018-09-28 RX ORDER — METHYLPREDNISOLONE SODIUM SUCCINATE 125 MG/2ML
125 INJECTION, POWDER, LYOPHILIZED, FOR SOLUTION INTRAMUSCULAR; INTRAVENOUS ONCE
Status: COMPLETED | OUTPATIENT
Start: 2018-09-28 | End: 2018-09-28

## 2018-09-28 RX ORDER — AZITHROMYCIN 250 MG/1
TABLET, FILM COATED ORAL
Qty: 4 TABLET | Refills: 0 | Status: SHIPPED | OUTPATIENT
Start: 2018-09-28 | End: 2019-05-29

## 2018-09-28 RX ORDER — IPRATROPIUM BROMIDE AND ALBUTEROL SULFATE 2.5; .5 MG/3ML; MG/3ML
3 SOLUTION RESPIRATORY (INHALATION) ONCE
Status: COMPLETED | OUTPATIENT
Start: 2018-09-28 | End: 2018-09-28

## 2018-09-28 RX ORDER — SODIUM CHLORIDE 0.9 % (FLUSH) 0.9 %
10 SYRINGE (ML) INJECTION AS NEEDED
Status: DISCONTINUED | OUTPATIENT
Start: 2018-09-28 | End: 2018-09-28 | Stop reason: HOSPADM

## 2018-09-28 RX ORDER — PREDNISONE 10 MG/1
TABLET ORAL
Qty: 28 TABLET | Refills: 0 | Status: SHIPPED | OUTPATIENT
Start: 2018-09-28 | End: 2019-05-29 | Stop reason: ALTCHOICE

## 2018-09-28 RX ADMIN — AZITHROMYCIN MONOHYDRATE 500 MG: 250 TABLET ORAL at 12:37

## 2018-09-28 RX ADMIN — IPRATROPIUM BROMIDE AND ALBUTEROL SULFATE 3 ML: .5; 3 SOLUTION RESPIRATORY (INHALATION) at 12:26

## 2018-09-28 RX ADMIN — METHYLPREDNISOLONE SODIUM SUCCINATE 125 MG: 125 INJECTION, POWDER, FOR SOLUTION INTRAMUSCULAR; INTRAVENOUS at 12:35

## 2018-09-28 RX ADMIN — HYDROCODONE BITARTRATE AND HOMATROPINE METHYLBROMIDE 5 ML: 5; 1.5 SOLUTION ORAL at 13:37

## 2019-01-31 ENCOUNTER — APPOINTMENT (OUTPATIENT)
Dept: GENERAL RADIOLOGY | Facility: HOSPITAL | Age: 84
End: 2019-01-31

## 2019-01-31 ENCOUNTER — HOSPITAL ENCOUNTER (EMERGENCY)
Facility: HOSPITAL | Age: 84
Discharge: HOME | End: 2019-01-31
Attending: EMERGENCY MEDICINE | Admitting: EMERGENCY MEDICINE

## 2019-01-31 VITALS
OXYGEN SATURATION: 93 % | WEIGHT: 187 LBS | SYSTOLIC BLOOD PRESSURE: 123 MMHG | DIASTOLIC BLOOD PRESSURE: 65 MMHG | TEMPERATURE: 98.6 F | RESPIRATION RATE: 16 BRPM | HEART RATE: 70 BPM | BODY MASS INDEX: 28.34 KG/M2 | HEIGHT: 68 IN

## 2019-01-31 DIAGNOSIS — S70.11XA CONTUSION OF RIGHT HIP AND THIGH, INITIAL ENCOUNTER: ICD-10-CM

## 2019-01-31 DIAGNOSIS — S70.01XA CONTUSION OF RIGHT HIP AND THIGH, INITIAL ENCOUNTER: ICD-10-CM
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
## 2019-01-31 PROCEDURE — 99283 EMERGENCY DEPT VISIT LOW MDM: CPT

## 2019-01-31 PROCEDURE — 99282 EMERGENCY DEPT VISIT SF MDM: CPT | Performed by: EMERGENCY MEDICINE

## 2019-01-31 PROCEDURE — 73502 X-RAY EXAM HIP UNI 2-3 VIEWS: CPT

## 2019-02-01 NOTE — ED NOTES
Cleaned wound with surecleanse. Applied bacitracin and Band-Aid.       Archana Carrasco  01/31/19 3068

## 2019-02-01 NOTE — ED NOTES
Ambulated patient patient didn't tolerate well family state she uses a walker at home and is not very steady      Archana Carrasco  01/31/19 3926

## 2019-02-01 NOTE — ED PROVIDER NOTES
Subjective   History of Present Illness  History of Present Illness    Chief complaint: Fall with hip injury    Location: Fall occurred at home and injury to right hip    Quality/Severity:  Moderate    Timing/Duration: Injury occurred shortly prior to arrival    Modifying Factors: Patient normally uses walker for ambulation and states that when she bent over she lost her balance and fell to the floor.    Associated Symptoms: Abrasion right hip    Narrative: The patient is an 87-year-old white female who presents as noted above.  The patient admits to having a poor balance and normally uses a walker with ambulation.  While trying to unplug a lamp the patient lost her balance and fell to the floor.  The patient could not regain her footing but was assisted a very short time later.  Denies loss of consciousness, head injury and neck pain.    Review of Systems   Constitutional: Negative for activity change, appetite change, fatigue and fever.   Musculoskeletal: Positive for arthralgias (due to arthritis) and gait problem.        Right hip and thigh pain   Neurological: Positive for weakness (age related). Negative for syncope and light-headedness.   All other systems reviewed and are negative.      Past Medical History:   Diagnosis Date   • Arthritis    • Asthma    • CHF (congestive heart failure) (CMS/HCC)    • Disease of thyroid gland    • Fracture of hip (CMS/HCC)    • Hyperlipidemia    • Hypertension    • Thyroid disease        Allergies   Allergen Reactions   • Codeine    • Ibuprofen    • Morphine And Related    • Penicillins        Past Surgical History:   Procedure Laterality Date   • CHOLECYSTECTOMY     • HIP SURGERY     • HYSTERECTOMY     • JOINT REPLACEMENT     • SHOULDER SURGERY     • THYROID SURGERY     • TONSILLECTOMY         Family History   Problem Relation Age of Onset   • Hypertension Mother    • Cancer Sister    • Diabetes Brother    • Hypertension Maternal Grandmother    • Heart disease Daughter    •  Hypertension Daughter    • Hypertension Son    • Hypertension Daughter    • Sudden death Son        Social History     Socioeconomic History   • Marital status:      Spouse name: Not on file   • Number of children: Not on file   • Years of education: Not on file   • Highest education level: Not on file   Tobacco Use   • Smoking status: Former Smoker   • Tobacco comment: caff use    Substance and Sexual Activity   • Alcohol use: No   • Drug use: No           Objective   Physical Exam   Constitutional: She is oriented to person, place, and time.   The patient is a moderately obese, frail-appearing, elderly, white female in no acute distress.   HENT:   Head: Normocephalic and atraumatic.   Eyes: Conjunctivae and EOM are normal. Pupils are equal, round, and reactive to light.   Musculoskeletal:   No spinal tenderness.  Examination of the right hip does show some mild diffuse tenderness and there is a small abrasion noted on the distal, lateral right thigh.  Pelvis is stable and nontender decreased range of motion in both the right hip and knee and advanced arthritic changes consistent with arthritis noted in the knee.   Neurological: She is alert and oriented to person, place, and time. No cranial nerve deficit.   Moderately hard of hearing   Psychiatric: She has a normal mood and affect. Her behavior is normal.   Nursing note and vitals reviewed.      Procedures           ED Course  ED Course as of Jan 31 2305   Thu Jan 31, 2019   2256 Patient was able to ambulate a short distance in the emergency department using her walker and minimal assistance.  No complaint of significant leg or pelvic pain.  [ML]   2305 Negative x-rays explained to the patient and family.  Treatment plan, expectations and warnings discussed  [ML]      ED Course User Index  [ML] Brayden Vaca MD                  [facility]  Number of Diagnoses or Management Options  Contusion of right hip and thigh, initial encounter: new and requires  workup  Fall at home, initial encounter: new and requires workup     Amount and/or Complexity of Data Reviewed  Tests in the radiology section of CPT®: ordered and reviewed  Independent visualization of images, tracings, or specimens: yes    Risk of Complications, Morbidity, and/or Mortality  Presenting problems: moderate  Diagnostic procedures: moderate    Patient Progress  Patient progress: stable        Final diagnoses:   Fall at home, initial encounter   Contusion of right hip and thigh, initial encounter            Brayden Vaca MD  01/31/19 9145

## 2019-04-11 ENCOUNTER — APPOINTMENT (OUTPATIENT)
Dept: GENERAL RADIOLOGY | Facility: HOSPITAL | Age: 84
End: 2019-04-11

## 2019-04-11 ENCOUNTER — APPOINTMENT (OUTPATIENT)
Dept: CT IMAGING | Facility: HOSPITAL | Age: 84
End: 2019-04-11

## 2019-04-11 ENCOUNTER — HOSPITAL ENCOUNTER (EMERGENCY)
Facility: HOSPITAL | Age: 84
Discharge: HOME | End: 2019-04-11
Attending: EMERGENCY MEDICINE | Admitting: EMERGENCY MEDICINE

## 2019-04-11 VITALS
TEMPERATURE: 98.1 F | DIASTOLIC BLOOD PRESSURE: 69 MMHG | SYSTOLIC BLOOD PRESSURE: 144 MMHG | OXYGEN SATURATION: 95 % | BODY MASS INDEX: 26.68 KG/M2 | WEIGHT: 170 LBS | RESPIRATION RATE: 20 BRPM | HEART RATE: 76 BPM | HEIGHT: 67 IN

## 2019-04-11 DIAGNOSIS — S39.012A STRAIN OF LUMBAR REGION, INITIAL ENCOUNTER: ICD-10-CM
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
## 2019-04-11 DIAGNOSIS — M17.11 ARTHRITIS OF RIGHT KNEE: ICD-10-CM

## 2019-04-11 DIAGNOSIS — S70.01XA CONTUSION OF RIGHT HIP, INITIAL ENCOUNTER: ICD-10-CM

## 2019-04-11 DIAGNOSIS — S16.1XXA STRAIN OF NECK MUSCLE, INITIAL ENCOUNTER: ICD-10-CM

## 2019-04-11 PROCEDURE — 72125 CT NECK SPINE W/O DYE: CPT

## 2019-04-11 PROCEDURE — 73502 X-RAY EXAM HIP UNI 2-3 VIEWS: CPT

## 2019-04-11 PROCEDURE — 99283 EMERGENCY DEPT VISIT LOW MDM: CPT

## 2019-04-11 PROCEDURE — 70450 CT HEAD/BRAIN W/O DYE: CPT

## 2019-04-11 PROCEDURE — 99284 EMERGENCY DEPT VISIT MOD MDM: CPT | Performed by: EMERGENCY MEDICINE

## 2019-04-11 PROCEDURE — 73560 X-RAY EXAM OF KNEE 1 OR 2: CPT

## 2019-04-11 PROCEDURE — 72131 CT LUMBAR SPINE W/O DYE: CPT

## 2019-04-11 RX ORDER — DOCUSATE SODIUM 100 MG/1
100 CAPSULE, LIQUID FILLED ORAL 2 TIMES DAILY
COMMUNITY
End: 2021-01-01 | Stop reason: HOSPADM

## 2019-04-11 RX ORDER — LIDOCAINE 50 MG/G
1 PATCH TOPICAL EVERY 24 HOURS
Qty: 10 PATCH | Refills: 0 | Status: SHIPPED | OUTPATIENT
Start: 2019-04-11 | End: 2019-05-29 | Stop reason: ALTCHOICE

## 2019-04-11 RX ORDER — CHOLECALCIFEROL (VITAMIN D3) 125 MCG
1 CAPSULE ORAL DAILY
COMMUNITY
End: 2021-01-01 | Stop reason: HOSPADM

## 2019-04-11 RX ORDER — GUAIFENESIN 600 MG/1
1200 TABLET, EXTENDED RELEASE ORAL AS NEEDED
COMMUNITY

## 2019-04-11 RX ORDER — TRAMADOL HYDROCHLORIDE 50 MG/1
50 TABLET ORAL ONCE
Status: COMPLETED | OUTPATIENT
Start: 2019-04-11 | End: 2019-04-11

## 2019-04-11 RX ORDER — ASPIRIN 81 MG
1 TABLET,CHEWABLE ORAL 4 TIMES DAILY PRN
Qty: 56.6 G | Refills: 0 | Status: SHIPPED | OUTPATIENT
Start: 2019-04-11 | End: 2019-05-29 | Stop reason: ALTCHOICE

## 2019-04-11 RX ORDER — BUDESONIDE 3 MG/1
6 CAPSULE, COATED PELLETS ORAL EVERY MORNING
COMMUNITY
End: 2019-05-29 | Stop reason: ALTCHOICE

## 2019-04-11 RX ORDER — ACETAMINOPHEN 500 MG
1000 TABLET ORAL ONCE
Status: COMPLETED | OUTPATIENT
Start: 2019-04-11 | End: 2019-04-11

## 2019-04-11 RX ADMIN — TRAMADOL HYDROCHLORIDE 50 MG: 50 TABLET, FILM COATED ORAL at 15:46

## 2019-04-11 RX ADMIN — ACETAMINOPHEN 1000 MG: 500 TABLET, FILM COATED ORAL at 15:46

## 2019-04-11 NOTE — DISCHARGE INSTRUCTIONS
It was our pleasure working with you and we hope you are feeling improved. Please follow-up with your regular physician if symptoms persist and return to ED if they change or are getting much worse. Please fill any prescriptions and take medications as scheduled if indicated.

## 2019-04-11 NOTE — ED PROVIDER NOTES
Subjective   EMERGENCY DEPARTMENT ENCOUNTER    Chief complaint: Fall    Location: head, neck, low back, right hip and leg.    Quality/Severity:  7/10 achy pain diffusely    Timing/Duration: JPTA    Modifying Factors: movement of neck, movement of back or right hip    Associated Symptoms: headache. Denies syncope, chest pain, N/V or dizziness.    Narrative: Pleasant 86yo female, not anticoagulated (takes 81mg aspirin daily), presents to ED after mechanical fall.  She got up to go the restroom and tripped tumbling forward and falling between a chair and her nightstand.  She thinks she may have bumped her head on the wall.  She does not think she lost consciousness.  She reports headache, neck pain, back pain, and right hip and leg pain.  She has an abrasion across her low back.  She denies chest pain or shortness of breath.  She did not pass out.  No nausea or vomiting.  No unilateral or focal weakness or sensory deficit noted.              Review of Systems   Constitutional: Negative for chills, fatigue and fever.   HENT: Negative for dental problem.    Respiratory: Negative for cough, chest tightness and shortness of breath.    Cardiovascular: Negative for chest pain and palpitations.   Gastrointestinal: Negative for abdominal pain.   Musculoskeletal: Positive for arthralgias, back pain, myalgias and neck pain.   Skin: Positive for wound. Negative for rash.        Low back abrasion   Neurological: Positive for headaches. Negative for seizures, syncope, light-headedness and numbness.   Hematological: Negative for adenopathy. Does not bruise/bleed easily.   Psychiatric/Behavioral: Negative for agitation.   All other systems reviewed and are negative.      Past Medical History:   Diagnosis Date   • Arthritis    • Asthma    • CHF (congestive heart failure) (CMS/[facility])    • Disease of thyroid gland    • Fracture of hip (CMS/[facility])    • Hyperlipidemia    • Hypertension    • Thyroid disease        Allergies   Allergen  Reactions   • Codeine    • Ibuprofen    • Morphine And Related    • Penicillins        Past Surgical History:   Procedure Laterality Date   • CHOLECYSTECTOMY     • HIP SURGERY     • HYSTERECTOMY     • JOINT REPLACEMENT     • SHOULDER SURGERY     • THYROID SURGERY     • TONSILLECTOMY         Family History   Problem Relation Age of Onset   • Hypertension Mother    • Cancer Sister    • Diabetes Brother    • Hypertension Maternal Grandmother    • Heart disease Daughter    • Hypertension Daughter    • Hypertension Son    • Hypertension Daughter    • Sudden death Son        Social History     Socioeconomic History   • Marital status:      Spouse name: Not on file   • Number of children: Not on file   • Years of education: Not on file   • Highest education level: Not on file   Tobacco Use   • Smoking status: Former Smoker   • Tobacco comment: caff use    Substance and Sexual Activity   • Alcohol use: No   • Drug use: No           Objective   Physical Exam   Constitutional: She is oriented to person, place, and time. She appears well-developed and well-nourished. No distress.   HENT:   Head: Normocephalic and atraumatic.   Eyes: Conjunctivae and EOM are normal.   Neck: Neck supple. No tracheal deviation present.   Mild midline posterior tenderness.  She will range her neck spontaneously without significant discomfort but says when asked pointedly that her posterior neck does hurt and it does hurt to move her chin to her shoulders bilaterally.   Cardiovascular: Normal rate, regular rhythm, normal heart sounds and intact distal pulses. Exam reveals no friction rub.   No murmur heard.  Pulmonary/Chest: Effort normal and breath sounds normal. No respiratory distress. She has no wheezes.   Abdominal: Soft. Bowel sounds are normal. She exhibits no distension. There is no tenderness.   Musculoskeletal: She exhibits no edema.        Right hip: She exhibits decreased range of motion, decreased strength and tenderness. She  exhibits no bony tenderness, no crepitus, no deformity and no laceration.        Right knee: She exhibits bony tenderness. She exhibits no swelling and no effusion. Tenderness found. No medial joint line, no lateral joint line and no patellar tendon tenderness noted.        Right ankle: She exhibits normal range of motion, no swelling, no deformity and no laceration. No tenderness.        Lumbar back: She exhibits tenderness, bony tenderness, pain and spasm. She exhibits no edema, no deformity and no laceration.        Back:         Right upper leg: She exhibits tenderness. She exhibits no swelling, no edema and no deformity.   Neurological: She is alert and oriented to person, place, and time. She has normal strength. No cranial nerve deficit or sensory deficit. GCS eye subscore is 4. GCS verbal subscore is 5. GCS motor subscore is 6.   Distal strength normal throughout.   Skin: Skin is warm and dry. Capillary refill takes less than 2 seconds. Abrasion noted.   Psychiatric: She has a normal mood and affect. Her speech is normal and behavior is normal. Cognition and memory are normal.   Nursing note and vitals reviewed.      Procedures           ED Course  ED Course as of Apr 11 1739   Thu Apr 11, 2019   1658 XR Hip With or Without Pelvis 2 - 3 View Right [JF]   1737 Patients ramila has improved. Radiological studies are normal and her and family are comfortable with Dc. She has been up on her feet in department, this is not far off her basic mobility at home. Family is discussing switching her to a walker without handbrakes/front wheels to improve her stability. We have discussed increasing frequency of tramadol to QID as needed and will script capsaicin and Lidoderm patches for pain. Follow-up pcp.  [JF]      ED Course User Index  [JF] Zohaib Wang MD      Xr Knee 1 Or 2 View Right    Result Date: 4/11/2019  1. No acute osseous abnormality. 2. Severe degenerative arthropathy.  This report was finalized on  4/11/2019 4:44 PM by Dr. Anil Fiore MD.      Ct Head Without Contrast    Result Date: 4/11/2019  Impression: 1. No clearly acute intracranial pathology demonstrated. 2. Generalized cerebral atrophy and nonspecific periventricular hypodensities which are thought to represent white matter microvascular change. 3. No significant interval change from prior study.    This report was finalized on 4/11/2019 4:41 PM by Dr. Doron Davison MD.      Ct Cervical Spine Without Contrast    Result Date: 4/11/2019  Impression: 1. No acute fracture is seen. 2. No acute radiographic abnormalities are identified.  3. Prominent spondylotic changes of the cervical spine. Clinical aspects of the case will determine if MR of the cervical spine could provide additional information.  This report was finalized on 4/11/2019 4:45 PM by Dr. Doron Davison MD.      Ct Lumbar Spine Without Contrast    Result Date: 4/11/2019  Impression: Severe lumbar spondylosis demonstrated on this study. Severe spinal canal stenosis secondary to prominent posterior osteophytosis at L1-2 and L2-3.  No clearly acute findings convincingly demonstrated.  Clinical aspects of the case will determine if MR of the lumbar spine could provide additional information.   This report was finalized on 4/11/2019 4:51 PM by Dr. Doron Davison MD.      Xr Hip With Or Without Pelvis 2 - 3 View Right    Result Date: 4/11/2019  1. No acute osseous abnormality. 2. Well-positioned right proximal femoral endoprosthesis.  This report was finalized on 4/11/2019 4:45 PM by Dr. Anil Fiore MD.                  MDM  Number of Diagnoses or Management Options  Diagnosis management comments: My differential diagnosis for back pain includes but is not limited to:  Musculoskeletal strain, contusion, retroperitoneal hematoma, disc protrusion, vertebral fracture, transverse process fracture, rib fracture, facet syndrome, sacroiliac joint strain, sciatica, renal injury, splenic injury,  pancreatic injury, osteoarthritis, lumbar spondylosis, spinal stenosis, ankylosing spondylitis, sacroiliac joint inflammation, pancreatitis, perforated peptic ulcer, diverticulitis, endometriosis, chronic PID, epidural abscess, osteomyelitis, retroperitoneal abscess, pyelonephritis, pneumonia, subphrenic abscess, tuberculosis, neurofibroma, meningioma, multiple myeloma, lymphoma, metastatic cancer, primary cancer, AAA, aortic dissection, spinal ischemia, referred pain, ureterolithiasis       Amount and/or Complexity of Data Reviewed  Clinical lab tests: ordered and reviewed  Tests in the radiology section of CPT®: ordered and reviewed  Independent visualization of images, tracings, or specimens: yes    Risk of Complications, Morbidity, and/or Mortality  Presenting problems: high  Diagnostic procedures: high  Management options: high    Patient Progress  Patient progress: improved        Final diagnoses:   Fall, initial encounter   Strain of neck muscle, initial encounter   Strain of lumbar region, initial encounter   Contusion of right hip, initial encounter   Arthritis of right knee            Zohaib Wang MD  04/11/19 4188

## 2019-05-29 ENCOUNTER — OFFICE VISIT (OUTPATIENT)
Dept: CARDIOLOGY | Facility: CLINIC | Age: 84
End: 2019-05-29

## 2019-05-29 VITALS
SYSTOLIC BLOOD PRESSURE: 140 MMHG | WEIGHT: 192 LBS | BODY MASS INDEX: 29.1 KG/M2 | HEART RATE: 63 BPM | DIASTOLIC BLOOD PRESSURE: 82 MMHG | HEIGHT: 68 IN

## 2019-05-29 DIAGNOSIS — I25.10 CORONARY ARTERY DISEASE INVOLVING NATIVE CORONARY ARTERY OF NATIVE HEART WITHOUT ANGINA PECTORIS: Primary | ICD-10-CM

## 2019-05-29 PROCEDURE — 93000 ELECTROCARDIOGRAM COMPLETE: CPT | Performed by: INTERNAL MEDICINE

## 2019-05-29 PROCEDURE — 99214 OFFICE O/P EST MOD 30 MIN: CPT | Performed by: INTERNAL MEDICINE

## 2019-05-29 NOTE — PROGRESS NOTES
Subjective:     Encounter Date:05/29/2019      Patient ID: Eduarda Lopez is a 87 y.o. female.    Chief Complaint: SOB, CP  History of Present Illness    Dear Lisseth,    I had the pleasure seeing this patient in the office today for f/u of her cardiac status.  She has a history of CAD.  It is been 1 year since her last visit.    She has not had any cardiac complaints over the last year.  No chest pain or chest discomfort.  Her main concern is that she has continued to have problems with stability.  She she will sometimes trip herself with her feet and is fallen.  Physical therapy has been working with her.  She walks with a walker at home.  She uses a mobile wheelchair when she goes out.  She does not have any syncope and has not lost consciousness at any time when she falls.  She denies any chest pain or chest discomfort.  Little bit shortness of breath when she gets up and walks, but she attributes to the that she does not do hardly any walking at all.  She only walks within the house.    We saw her approximately a year ago at that point performed an echocardiogram and a stress test because she was having some dyspnea with activity.  These 2 studies basically looked fine.    Echocardiogram June 2018:  Interpretation Summary     · Left ventricular systolic function is normal. Calculated EF = 52%. Estimated EF appears to be in the range of 56 - 60%. Normal left ventricular cavity size and wall thickness noted. All left ventricular wall segments contract normally. Left ventricular diastolic dysfunction is noted (grade I) consistent with impaired relaxation.        Stress test June 2018:  Interpretation Summary     · Left ventricular ejection fraction is normal (Calculated EF = 70%).  · Myocardial perfusion imaging indicates a normal myocardial perfusion study with no evidence of ischemia.  · Impressions are consistent with a low risk study.            Last year she had an echocardiogram performed that showed  diastolic dysfunction with grade 2 diastolic dysfunction.  Systolic function was normal.  She doesn't recall why that echocardiogram was ordered.    The following portions of the patient's history were reviewed and updated as appropriate: allergies, current medications, past family history, past medical history, past social history, past surgical history and problem list.    Past Medical History:   Diagnosis Date   • Arthritis    • Asthma    • CHF (congestive heart failure) (CMS/[facility])    • Disease of thyroid gland    • Fracture of hip (CMS/[facility])    • Hyperlipidemia    • Hypertension    • Thyroid disease        Past Surgical History:   Procedure Laterality Date   • CHOLECYSTECTOMY     • HIP SURGERY     • HYSTERECTOMY     • JOINT REPLACEMENT     • SHOULDER SURGERY     • THYROID SURGERY     • TONSILLECTOMY         Social History     Socioeconomic History   • Marital status:      Spouse name: Not on file   • Number of children: Not on file   • Years of education: Not on file   • Highest education level: Not on file   Tobacco Use   • Smoking status: Former Smoker   • Tobacco comment: caff use    Substance and Sexual Activity   • Alcohol use: No   • Drug use: No       Review of Systems   Constitution: Positive for weakness and malaise/fatigue. Negative for chills, decreased appetite, fever and night sweats.   HENT: Negative for ear discharge, ear pain, hearing loss, nosebleeds and sore throat.    Eyes: Negative for blurred vision, double vision and pain.   Cardiovascular: Negative for cyanosis.   Respiratory: Negative for hemoptysis and sputum production.    Endocrine: Negative for cold intolerance and heat intolerance.   Hematologic/Lymphatic: Negative for adenopathy.   Skin: Negative for dry skin, itching, nail changes, rash and suspicious lesions.   Musculoskeletal: Positive for back pain and joint pain. Negative for arthritis, gout, muscle cramps, muscle weakness, myalgias and neck pain.   Gastrointestinal:  "Negative for anorexia, bowel incontinence, constipation, diarrhea, dysphagia, hematemesis and jaundice.   Genitourinary: Negative for bladder incontinence, dysuria, flank pain, frequency, hematuria and nocturia.   Neurological: Negative for focal weakness, numbness, paresthesias and seizures.   Psychiatric/Behavioral: Positive for memory loss. Negative for altered mental status, hallucinations, hypervigilance, suicidal ideas and thoughts of violence.   Allergic/Immunologic: Negative for persistent infections.         ECG 12 Lead  Date/Time: 5/29/2019 12:19 PM  Performed by: Adair Greer III, MD  Authorized by: Adair Greer III, MD   Comparison: compared with previous ECG   Similar to previous ECG  Rhythm: sinus rhythm  Rate: normal  Conduction: conduction normal  ST Segments: ST segments normal  T Waves: T waves normal  QRS axis: normal  Other: no other findings    Clinical impression: normal ECG               Objective:     Vitals:    05/29/19 1112   BP: 140/82   Pulse: 63   Weight: 87.1 kg (192 lb)   Height: 172.7 cm (68\")         Physical Exam   Constitutional: She is oriented to person, place, and time. She appears well-developed and well-nourished. No distress.   HENT:   Head: Normocephalic and atraumatic.   Nose: Nose normal.   Mouth/Throat: Oropharynx is clear and moist.   Eyes: Conjunctivae and EOM are normal. Pupils are equal, round, and reactive to light. Right eye exhibits no discharge. Left eye exhibits no discharge.   Neck: Normal range of motion. Neck supple. No tracheal deviation present. No thyromegaly present.   Cardiovascular: Normal rate, regular rhythm, S1 normal, S2 normal, normal heart sounds and normal pulses. Exam reveals no S3.   Pulmonary/Chest: Effort normal and breath sounds normal. No stridor. No respiratory distress. She exhibits no tenderness.   Abdominal: Soft. Bowel sounds are normal. She exhibits no distension and no mass. There is no tenderness. There is no rebound and no " guarding.   Musculoskeletal: Normal range of motion. She exhibits no tenderness or deformity.   Lymphadenopathy:     She has no cervical adenopathy.   Neurological: She is alert and oriented to person, place, and time. She has normal reflexes.   Skin: Skin is warm and dry. No rash noted. She is not diaphoretic. No erythema.   Psychiatric: She has a normal mood and affect. Thought content normal.       Lab Review:             Performed        Assessment:          Diagnosis Plan   1. Coronary artery disease involving native coronary artery of native heart without angina pectoris  ECG 12 Lead          Plan:       1. Coronary Artery Disease  Assessment  • The patient has no angina    Plan  • Lifestyle modifications discussed include adhering to a heart healthy diet, medication compliance and regular monitoring of cholesterol and blood pressure    Subjective - Objective  • Current antiplatelet therapy includes aspirin 81 mg      Thank you very much for allowing us to participate in the care of this pleasant patient.  Please don't hesitate to call if I can be of assistance in any way.      Current Outpatient Medications:   •  acetaminophen (TYLENOL) 650 MG 8 hr tablet, Take 650 mg by mouth Every 8 (Eight) Hours As Needed for Mild Pain ., Disp: , Rfl:   •  aspirin 81 MG tablet, Take 81 mg by mouth Daily., Disp: , Rfl:   •  budesonide-formoterol (SYMBICORT) 160-4.5 MCG/ACT inhaler, 2 (two) times a day., Disp: , Rfl:   •  Cetirizine HCl 10 MG capsule, Take 10 mg by mouth Daily., Disp: , Rfl:   •  Cholecalciferol (VITAMIN D3) 2000 units tablet, Take 1 tablet by mouth Daily., Disp: , Rfl:   •  citalopram (CeleXA) 20 MG tablet, Take 20 mg by mouth Daily., Disp: , Rfl:   •  clonazePAM (KlonoPIN) 0.5 MG tablet, Take 0.5 mg by mouth 2 (Two) Times a Day As Needed., Disp: , Rfl:   •  docusate sodium (COLACE) 100 MG capsule, Take 100 mg by mouth 2 (Two) Times a Day., Disp: , Rfl:   •  famotidine (PEPCID) 40 MG tablet, Take 40 mg by  mouth Daily., Disp: , Rfl:   •  fluticasone (FLONASE) 50 MCG/ACT nasal spray, into each nostril daily., Disp: , Rfl:   •  Formoterol Fumarate (PERFOROMIST IN), Inhale., Disp: , Rfl:   •  furosemide (LASIX) 40 MG tablet, Take 1 tablet by mouth 2 (Two) Times a Day. (Patient taking differently: Take 40 mg by mouth 2 (Two) Times a Day. 1 tab AM and 0.5 tab at 2 PM daily), Disp: 180 tablet, Rfl: 1  •  guaiFENesin (MUCINEX) 600 MG 12 hr tablet, Take 1,200 mg by mouth 2 (Two) Times a Day., Disp: , Rfl:   •  levothyroxine (SYNTHROID, LEVOTHROID) 50 MCG tablet, Take 50 mcg by mouth Daily., Disp: , Rfl:   •  Multiple Vitamin (MULTI VITAMIN DAILY PO), Take 1 tablet by mouth Daily., Disp: , Rfl:   •  NIFEdipine CC (ADALAT CC) 30 MG 24 hr tablet, Take 30 mg by mouth Daily., Disp: , Rfl:   •  Polyethylene Glycol 3350 (MIRALAX PO), Take 1 tablet by mouth Daily., Disp: , Rfl:   •  traMADol (ULTRAM) 50 MG tablet, Take 50 mg by mouth 3 (Three) Times a Day., Disp: , Rfl:

## 2019-08-09 ENCOUNTER — LAB REQUISITION (OUTPATIENT)
Dept: LAB | Facility: HOSPITAL | Age: 84
End: 2019-08-09

## 2019-08-09 DIAGNOSIS — F41.9 ANXIETY DISORDER: ICD-10-CM

## 2019-08-09 DIAGNOSIS — I50.20 SYSTOLIC CONGESTIVE HEART FAILURE (HCC): ICD-10-CM

## 2019-08-09 DIAGNOSIS — R52 PAIN: ICD-10-CM

## 2019-08-09 LAB
ANION GAP SERPL CALCULATED.3IONS-SCNC: 11.5 MMOL/L (ref 5–15)
BUN BLD-MCNC: 9 MG/DL (ref 8–23)
BUN/CREAT SERPL: 12 (ref 7–25)
CALCIUM SPEC-SCNC: 9.3 MG/DL (ref 8.6–10.5)
CHLORIDE SERPL-SCNC: 93 MMOL/L (ref 98–107)
CO2 SERPL-SCNC: 29.5 MMOL/L (ref 22–29)
CREAT BLD-MCNC: 0.75 MG/DL (ref 0.57–1)
GFR SERPL CREATININE-BSD FRML MDRD: 73 ML/MIN/1.73
GLUCOSE BLD-MCNC: 94 MG/DL (ref 65–99)
POTASSIUM BLD-SCNC: 4.6 MMOL/L (ref 3.5–5.2)
SODIUM BLD-SCNC: 134 MMOL/L (ref 136–145)

## 2019-08-09 PROCEDURE — 80048 BASIC METABOLIC PNL TOTAL CA: CPT | Performed by: PHYSICAL MEDICINE & REHABILITATION

## 2019-08-13 ENCOUNTER — LAB REQUISITION (OUTPATIENT)
Dept: LAB | Facility: HOSPITAL | Age: 84
End: 2019-08-13

## 2019-08-13 DIAGNOSIS — I50.9 HEART FAILURE (HCC): ICD-10-CM

## 2019-08-13 DIAGNOSIS — M62.81 MUSCLE WEAKNESS (GENERALIZED): ICD-10-CM

## 2019-08-13 PROCEDURE — G0480 DRUG TEST DEF 1-7 CLASSES: HCPCS | Performed by: PHYSICAL MEDICINE & REHABILITATION

## 2019-08-15 LAB — CLONAZEPAM SERPL-MCNC: 5 NG/ML (ref 20–70)

## 2019-08-17 LAB — TRAMADOL BLD-MCNC: 219 NG/ML (ref 150–800)

## 2020-01-01 ENCOUNTER — APPOINTMENT (OUTPATIENT)
Dept: GENERAL RADIOLOGY | Facility: HOSPITAL | Age: 85
End: 2020-01-01

## 2020-01-01 ENCOUNTER — APPOINTMENT (OUTPATIENT)
Dept: CARDIOLOGY | Facility: HOSPITAL | Age: 85
End: 2020-01-01

## 2020-01-01 ENCOUNTER — OFFICE VISIT (OUTPATIENT)
Dept: CARDIOLOGY | Facility: CLINIC | Age: 85
End: 2020-01-01

## 2020-01-01 ENCOUNTER — READMISSION MANAGEMENT (OUTPATIENT)
Dept: CALL CENTER | Facility: HOSPITAL | Age: 85
End: 2020-01-01

## 2020-01-01 ENCOUNTER — HOSPITAL ENCOUNTER (OUTPATIENT)
Facility: HOSPITAL | Age: 85
Setting detail: OBSERVATION
Discharge: HOME HEALTH | End: 2020-09-22
Attending: EMERGENCY MEDICINE | Admitting: HOSPITALIST

## 2020-01-01 ENCOUNTER — LAB REQUISITION (OUTPATIENT)
Dept: LAB | Facility: HOSPITAL | Age: 85
End: 2020-01-01

## 2020-01-01 ENCOUNTER — APPOINTMENT (OUTPATIENT)
Dept: ULTRASOUND IMAGING | Facility: HOSPITAL | Age: 85
End: 2020-01-01

## 2020-01-01 ENCOUNTER — APPOINTMENT (OUTPATIENT)
Dept: CT IMAGING | Facility: HOSPITAL | Age: 85
End: 2020-01-01

## 2020-01-01 ENCOUNTER — HOSPITAL ENCOUNTER (INPATIENT)
Facility: HOSPITAL | Age: 85
LOS: 12 days | Discharge: HOSPICE | End: 2021-01-11
Attending: EMERGENCY MEDICINE | Admitting: HOSPITALIST

## 2020-01-01 ENCOUNTER — HOSPITAL ENCOUNTER (EMERGENCY)
Facility: HOSPITAL | Age: 85
Discharge: HOME | End: 2020-11-24
Attending: EMERGENCY MEDICINE | Admitting: EMERGENCY MEDICINE

## 2020-01-01 VITALS
HEIGHT: 68 IN | WEIGHT: 175 LBS | HEART RATE: 78 BPM | TEMPERATURE: 97.5 F | DIASTOLIC BLOOD PRESSURE: 88 MMHG | OXYGEN SATURATION: 98 % | BODY MASS INDEX: 26.52 KG/M2 | SYSTOLIC BLOOD PRESSURE: 162 MMHG | RESPIRATION RATE: 20 BRPM

## 2020-01-01 VITALS
HEIGHT: 68 IN | WEIGHT: 171.1 LBS | SYSTOLIC BLOOD PRESSURE: 128 MMHG | RESPIRATION RATE: 18 BRPM | DIASTOLIC BLOOD PRESSURE: 79 MMHG | BODY MASS INDEX: 25.93 KG/M2 | OXYGEN SATURATION: 95 % | HEART RATE: 78 BPM | TEMPERATURE: 98.5 F

## 2020-01-01 VITALS
WEIGHT: 187 LBS | HEIGHT: 68 IN | DIASTOLIC BLOOD PRESSURE: 60 MMHG | BODY MASS INDEX: 28.34 KG/M2 | OXYGEN SATURATION: 95 % | SYSTOLIC BLOOD PRESSURE: 117 MMHG | HEART RATE: 74 BPM

## 2020-01-01 DIAGNOSIS — R42 DIZZINESS: ICD-10-CM

## 2020-01-01 DIAGNOSIS — R11.0 NAUSEA: ICD-10-CM

## 2020-01-01 DIAGNOSIS — S36.029A SPLEEN HEMATOMA, INITIAL ENCOUNTER: Primary | ICD-10-CM

## 2020-01-01 DIAGNOSIS — I25.118 CORONARY ARTERY DISEASE INVOLVING NATIVE CORONARY ARTERY OF NATIVE HEART WITH OTHER FORM OF ANGINA PECTORIS (HCC): ICD-10-CM

## 2020-01-01 DIAGNOSIS — I50.32 CHRONIC DIASTOLIC CHF (CONGESTIVE HEART FAILURE) (HCC): ICD-10-CM

## 2020-01-01 DIAGNOSIS — Z51.5 COMFORT MEASURES ONLY STATUS: ICD-10-CM

## 2020-01-01 DIAGNOSIS — J96.21 ACUTE ON CHRONIC RESPIRATORY FAILURE WITH HYPOXIA (HCC): ICD-10-CM

## 2020-01-01 DIAGNOSIS — R32 UNSPECIFIED URINARY INCONTINENCE: ICD-10-CM

## 2020-01-01 DIAGNOSIS — J44.1 COPD EXACERBATION (HCC): Primary | ICD-10-CM

## 2020-01-01 DIAGNOSIS — N39.0 ACUTE UTI: Primary | ICD-10-CM

## 2020-01-01 DIAGNOSIS — N39.0 URINARY TRACT INFECTION, SITE NOT SPECIFIED: ICD-10-CM

## 2020-01-01 DIAGNOSIS — I11.0 HYPERTENSIVE HEART DISEASE WITH HEART FAILURE (HCC): ICD-10-CM

## 2020-01-01 DIAGNOSIS — R07.89 CHEST PAIN, ATYPICAL: ICD-10-CM

## 2020-01-01 LAB
ABO GROUP BLD: NORMAL
ADV 40+41 DNA STL QL NAA+NON-PROBE: NOT DETECTED
ALBUMIN SERPL-MCNC: 3.6 G/DL (ref 3.5–5.2)
ALBUMIN SERPL-MCNC: 3.9 G/DL (ref 3.5–5.2)
ALBUMIN SERPL-MCNC: 4 G/DL (ref 3.5–5.2)
ALBUMIN SERPL-MCNC: 4.1 G/DL (ref 3.5–5.2)
ALBUMIN/GLOB SERPL: 1.1 G/DL
ALBUMIN/GLOB SERPL: 1.2 G/DL
ALBUMIN/GLOB SERPL: 1.2 G/DL
ALBUMIN/GLOB SERPL: 1.5 G/DL
ALP SERPL-CCNC: 69 U/L (ref 39–117)
ALP SERPL-CCNC: 74 U/L (ref 39–117)
ALP SERPL-CCNC: 75 U/L (ref 39–117)
ALP SERPL-CCNC: 82 U/L (ref 39–117)
ALT SERPL W P-5'-P-CCNC: 10 U/L (ref 1–33)
ALT SERPL W P-5'-P-CCNC: 6 U/L (ref 1–33)
ALT SERPL W P-5'-P-CCNC: 7 U/L (ref 1–33)
ALT SERPL W P-5'-P-CCNC: 8 U/L (ref 1–33)
ANION GAP SERPL CALCULATED.3IONS-SCNC: 10.4 MMOL/L (ref 5–15)
ANION GAP SERPL CALCULATED.3IONS-SCNC: 11.3 MMOL/L (ref 5–15)
ANION GAP SERPL CALCULATED.3IONS-SCNC: 13.1 MMOL/L (ref 5–15)
ANION GAP SERPL CALCULATED.3IONS-SCNC: 7.4 MMOL/L (ref 5–15)
ANION GAP SERPL CALCULATED.3IONS-SCNC: 9 MMOL/L (ref 5–15)
ANION GAP SERPL CALCULATED.3IONS-SCNC: 9.6 MMOL/L (ref 5–15)
ARTERIAL PATENCY WRIST A: POSITIVE
AST SERPL-CCNC: 13 U/L (ref 1–32)
AST SERPL-CCNC: 13 U/L (ref 1–32)
AST SERPL-CCNC: 17 U/L (ref 1–32)
AST SERPL-CCNC: 17 U/L (ref 1–32)
ASTRO TYP 1-8 RNA STL QL NAA+NON-PROBE: NOT DETECTED
ATMOSPHERIC PRESS: 755.1 MMHG
BACTERIA SPEC AEROBE CULT: NO GROWTH
BACTERIA UR QL AUTO: ABNORMAL /HPF
BASE EXCESS BLDA CALC-SCNC: 2.1 MMOL/L (ref 0–2)
BASOPHILS # BLD AUTO: 0.03 10*3/MM3 (ref 0–0.2)
BASOPHILS # BLD AUTO: 0.03 10*3/MM3 (ref 0–0.2)
BASOPHILS # BLD AUTO: 0.04 10*3/MM3 (ref 0–0.2)
BASOPHILS # BLD AUTO: 0.04 10*3/MM3 (ref 0–0.2)
BASOPHILS NFR BLD AUTO: 0.3 % (ref 0–1.5)
BASOPHILS NFR BLD AUTO: 0.4 % (ref 0–1.5)
BDY SITE: ABNORMAL
BH CV LOWER VASCULAR LEFT COMMON FEMORAL AUGMENT: NORMAL
BH CV LOWER VASCULAR LEFT COMMON FEMORAL COMPETENT: NORMAL
BH CV LOWER VASCULAR LEFT COMMON FEMORAL COMPRESS: NORMAL
BH CV LOWER VASCULAR LEFT COMMON FEMORAL PHASIC: NORMAL
BH CV LOWER VASCULAR LEFT COMMON FEMORAL SPONT: NORMAL
BH CV LOWER VASCULAR LEFT DISTAL FEMORAL COMPRESS: NORMAL
BH CV LOWER VASCULAR LEFT GASTRONEMIUS COMPRESS: NORMAL
BH CV LOWER VASCULAR LEFT GREATER SAPH AK COMPRESS: NORMAL
BH CV LOWER VASCULAR LEFT GREATER SAPH BK COMPRESS: NORMAL
BH CV LOWER VASCULAR LEFT LESSER SAPH COMPRESS: NORMAL
BH CV LOWER VASCULAR LEFT MID FEMORAL AUGMENT: NORMAL
BH CV LOWER VASCULAR LEFT MID FEMORAL COMPETENT: NORMAL
BH CV LOWER VASCULAR LEFT MID FEMORAL COMPRESS: NORMAL
BH CV LOWER VASCULAR LEFT MID FEMORAL PHASIC: NORMAL
BH CV LOWER VASCULAR LEFT MID FEMORAL SPONT: NORMAL
BH CV LOWER VASCULAR LEFT PERONEAL COMPRESS: NORMAL
BH CV LOWER VASCULAR LEFT POPLITEAL AUGMENT: NORMAL
BH CV LOWER VASCULAR LEFT POPLITEAL COMPETENT: NORMAL
BH CV LOWER VASCULAR LEFT POPLITEAL COMPRESS: NORMAL
BH CV LOWER VASCULAR LEFT POPLITEAL PHASIC: NORMAL
BH CV LOWER VASCULAR LEFT POPLITEAL SPONT: NORMAL
BH CV LOWER VASCULAR LEFT POSTERIOR TIBIAL COMPRESS: NORMAL
BH CV LOWER VASCULAR LEFT PROFUNDA FEMORAL COMPRESS: NORMAL
BH CV LOWER VASCULAR LEFT PROXIMAL FEMORAL COMPRESS: NORMAL
BH CV LOWER VASCULAR LEFT SAPHENOFEMORAL JUNCTION COMPRESS: NORMAL
BH CV LOWER VASCULAR RIGHT COMMON FEMORAL AUGMENT: NORMAL
BH CV LOWER VASCULAR RIGHT COMMON FEMORAL COMPETENT: NORMAL
BH CV LOWER VASCULAR RIGHT COMMON FEMORAL COMPRESS: NORMAL
BH CV LOWER VASCULAR RIGHT COMMON FEMORAL PHASIC: NORMAL
BH CV LOWER VASCULAR RIGHT COMMON FEMORAL SPONT: NORMAL
BH CV LOWER VASCULAR RIGHT DISTAL FEMORAL COMPRESS: NORMAL
BH CV LOWER VASCULAR RIGHT GASTRONEMIUS COMPRESS: NORMAL
BH CV LOWER VASCULAR RIGHT GREATER SAPH AK COMPRESS: NORMAL
BH CV LOWER VASCULAR RIGHT GREATER SAPH BK COMPRESS: NORMAL
BH CV LOWER VASCULAR RIGHT LESSER SAPH COMPRESS: NORMAL
BH CV LOWER VASCULAR RIGHT MID FEMORAL AUGMENT: NORMAL
BH CV LOWER VASCULAR RIGHT MID FEMORAL COMPETENT: NORMAL
BH CV LOWER VASCULAR RIGHT MID FEMORAL COMPRESS: NORMAL
BH CV LOWER VASCULAR RIGHT MID FEMORAL PHASIC: NORMAL
BH CV LOWER VASCULAR RIGHT MID FEMORAL SPONT: NORMAL
BH CV LOWER VASCULAR RIGHT PERONEAL COMPRESS: NORMAL
BH CV LOWER VASCULAR RIGHT POPLITEAL AUGMENT: NORMAL
BH CV LOWER VASCULAR RIGHT POPLITEAL COMPETENT: NORMAL
BH CV LOWER VASCULAR RIGHT POPLITEAL COMPRESS: NORMAL
BH CV LOWER VASCULAR RIGHT POPLITEAL PHASIC: NORMAL
BH CV LOWER VASCULAR RIGHT POPLITEAL SPONT: NORMAL
BH CV LOWER VASCULAR RIGHT POSTERIOR TIBIAL COMPRESS: NORMAL
BH CV LOWER VASCULAR RIGHT PROFUNDA FEMORAL COMPRESS: NORMAL
BH CV LOWER VASCULAR RIGHT PROXIMAL FEMORAL COMPRESS: NORMAL
BH CV LOWER VASCULAR RIGHT SAPHENOFEMORAL JUNCTION COMPRESS: NORMAL
BILIRUB SERPL-MCNC: 0.3 MG/DL (ref 0–1.2)
BILIRUB SERPL-MCNC: 0.4 MG/DL (ref 0–1.2)
BILIRUB SERPL-MCNC: 1 MG/DL (ref 0–1.2)
BILIRUB SERPL-MCNC: 1 MG/DL (ref 0–1.2)
BILIRUB UR QL STRIP: NEGATIVE
BLD GP AB SCN SERPL QL: NEGATIVE
BUN SERPL-MCNC: 10 MG/DL (ref 8–23)
BUN SERPL-MCNC: 10 MG/DL (ref 8–23)
BUN SERPL-MCNC: 11 MG/DL (ref 8–23)
BUN SERPL-MCNC: 13 MG/DL (ref 8–23)
BUN SERPL-MCNC: 13 MG/DL (ref 8–23)
BUN SERPL-MCNC: 7 MG/DL (ref 8–23)
BUN/CREAT SERPL: 12.3 (ref 7–25)
BUN/CREAT SERPL: 13.2 (ref 7–25)
BUN/CREAT SERPL: 13.4 (ref 7–25)
BUN/CREAT SERPL: 19.7 (ref 7–25)
BUN/CREAT SERPL: 19.7 (ref 7–25)
BUN/CREAT SERPL: 9.1 (ref 7–25)
C CAYETANENSIS DNA STL QL NAA+NON-PROBE: NOT DETECTED
C DIFF TOX GENS STL QL NAA+PROBE: NEGATIVE
CALCIUM SPEC-SCNC: 8.6 MG/DL (ref 8.6–10.5)
CALCIUM SPEC-SCNC: 8.9 MG/DL (ref 8.6–10.5)
CALCIUM SPEC-SCNC: 9 MG/DL (ref 8.6–10.5)
CALCIUM SPEC-SCNC: 9 MG/DL (ref 8.6–10.5)
CALCIUM SPEC-SCNC: 9.2 MG/DL (ref 8.6–10.5)
CALCIUM SPEC-SCNC: 9.3 MG/DL (ref 8.6–10.5)
CAMPY SP DNA.DIARRHEA STL QL NAA+PROBE: NOT DETECTED
CHLORIDE SERPL-SCNC: 92 MMOL/L (ref 98–107)
CHLORIDE SERPL-SCNC: 95 MMOL/L (ref 98–107)
CHLORIDE SERPL-SCNC: 95 MMOL/L (ref 98–107)
CHLORIDE SERPL-SCNC: 96 MMOL/L (ref 98–107)
CLARITY UR: CLEAR
CO2 SERPL-SCNC: 24.9 MMOL/L (ref 22–29)
CO2 SERPL-SCNC: 25.7 MMOL/L (ref 22–29)
CO2 SERPL-SCNC: 28.6 MMOL/L (ref 22–29)
CO2 SERPL-SCNC: 29.4 MMOL/L (ref 22–29)
CO2 SERPL-SCNC: 31.6 MMOL/L (ref 22–29)
CO2 SERPL-SCNC: 32 MMOL/L (ref 22–29)
COLOR UR: NORMAL
COLOR UR: YELLOW
COLOR UR: YELLOW
CREAT SERPL-MCNC: 0.66 MG/DL (ref 0.57–1)
CREAT SERPL-MCNC: 0.66 MG/DL (ref 0.57–1)
CREAT SERPL-MCNC: 0.76 MG/DL (ref 0.57–1)
CREAT SERPL-MCNC: 0.77 MG/DL (ref 0.57–1)
CREAT SERPL-MCNC: 0.81 MG/DL (ref 0.57–1)
CREAT SERPL-MCNC: 0.82 MG/DL (ref 0.57–1)
CRYPTOSP STL CULT: NOT DETECTED
D DIMER PPP FEU-MCNC: 0.89 MCGFEU/ML (ref 0–0.49)
DEPRECATED RDW RBC AUTO: 41.2 FL (ref 37–54)
DEPRECATED RDW RBC AUTO: 43 FL (ref 37–54)
DEPRECATED RDW RBC AUTO: 43.3 FL (ref 37–54)
DEPRECATED RDW RBC AUTO: 44.6 FL (ref 37–54)
DEPRECATED RDW RBC AUTO: 46.3 FL (ref 37–54)
DEPRECATED RDW RBC AUTO: 46.9 FL (ref 37–54)
E COLI DNA SPEC QL NAA+PROBE: NOT DETECTED
E HISTOLYT AG STL-ACNC: NOT DETECTED
EAEC PAA PLAS AGGR+AATA ST NAA+NON-PRB: NOT DETECTED
EC STX1 + STX2 GENES STL NAA+PROBE: NOT DETECTED
EOSINOPHIL # BLD AUTO: 0.02 10*3/MM3 (ref 0–0.4)
EOSINOPHIL # BLD AUTO: 0.04 10*3/MM3 (ref 0–0.4)
EOSINOPHIL # BLD AUTO: 0.07 10*3/MM3 (ref 0–0.4)
EOSINOPHIL # BLD AUTO: 0.1 10*3/MM3 (ref 0–0.4)
EOSINOPHIL NFR BLD AUTO: 0.2 % (ref 0.3–6.2)
EOSINOPHIL NFR BLD AUTO: 0.4 % (ref 0.3–6.2)
EOSINOPHIL NFR BLD AUTO: 0.9 % (ref 0.3–6.2)
EOSINOPHIL NFR BLD AUTO: 0.9 % (ref 0.3–6.2)
EPEC EAE GENE STL QL NAA+NON-PROBE: NOT DETECTED
ERYTHROCYTE [DISTWIDTH] IN BLOOD BY AUTOMATED COUNT: 13.4 % (ref 12.3–15.4)
ERYTHROCYTE [DISTWIDTH] IN BLOOD BY AUTOMATED COUNT: 13.5 % (ref 12.3–15.4)
ERYTHROCYTE [DISTWIDTH] IN BLOOD BY AUTOMATED COUNT: 13.6 % (ref 12.3–15.4)
ERYTHROCYTE [DISTWIDTH] IN BLOOD BY AUTOMATED COUNT: 13.7 % (ref 12.3–15.4)
ERYTHROCYTE [DISTWIDTH] IN BLOOD BY AUTOMATED COUNT: 15.1 % (ref 12.3–15.4)
ERYTHROCYTE [DISTWIDTH] IN BLOOD BY AUTOMATED COUNT: 15.1 % (ref 12.3–15.4)
ETEC LTA+ST1A+ST1B TOX ST NAA+NON-PROBE: NOT DETECTED
FERRITIN SERPL-MCNC: 45.9 NG/ML (ref 13–150)
FOLATE SERPL-MCNC: 13.6 NG/ML (ref 4.78–24.2)
G LAMBLIA DNA SPEC QL NAA+PROBE: NOT DETECTED
GFR SERPL CREATININE-BSD FRML MDRD: 66 ML/MIN/1.73
GFR SERPL CREATININE-BSD FRML MDRD: 67 ML/MIN/1.73
GFR SERPL CREATININE-BSD FRML MDRD: 71 ML/MIN/1.73
GFR SERPL CREATININE-BSD FRML MDRD: 72 ML/MIN/1.73
GFR SERPL CREATININE-BSD FRML MDRD: 85 ML/MIN/1.73
GFR SERPL CREATININE-BSD FRML MDRD: 85 ML/MIN/1.73
GLOBULIN UR ELPH-MCNC: 2.7 GM/DL
GLOBULIN UR ELPH-MCNC: 2.9 GM/DL
GLOBULIN UR ELPH-MCNC: 3.3 GM/DL
GLOBULIN UR ELPH-MCNC: 3.4 GM/DL
GLUCOSE SERPL-MCNC: 106 MG/DL (ref 65–99)
GLUCOSE SERPL-MCNC: 139 MG/DL (ref 65–99)
GLUCOSE SERPL-MCNC: 166 MG/DL (ref 65–99)
GLUCOSE SERPL-MCNC: 215 MG/DL (ref 65–99)
GLUCOSE SERPL-MCNC: 88 MG/DL (ref 65–99)
GLUCOSE SERPL-MCNC: 92 MG/DL (ref 65–99)
GLUCOSE UR STRIP-MCNC: NEGATIVE MG/DL
HCO3 BLDA-SCNC: 25.7 MMOL/L (ref 22–28)
HCT VFR BLD AUTO: 30 % (ref 34–46.6)
HCT VFR BLD AUTO: 30 % (ref 34–46.6)
HCT VFR BLD AUTO: 30.1 % (ref 34–46.6)
HCT VFR BLD AUTO: 30.3 % (ref 34–46.6)
HCT VFR BLD AUTO: 30.7 % (ref 34–46.6)
HCT VFR BLD AUTO: 30.8 % (ref 34–46.6)
HCT VFR BLD AUTO: 31.1 % (ref 34–46.6)
HCT VFR BLD AUTO: 32 % (ref 34–46.6)
HCT VFR BLD AUTO: 32.3 % (ref 34–46.6)
HCT VFR BLD AUTO: 32.6 % (ref 34–46.6)
HCT VFR BLD AUTO: 32.8 % (ref 34–46.6)
HCT VFR BLD AUTO: 33.6 % (ref 34–46.6)
HCT VFR BLD AUTO: 33.6 % (ref 34–46.6)
HCT VFR BLD AUTO: 34.5 % (ref 34–46.6)
HCT VFR BLD AUTO: 35.2 % (ref 34–46.6)
HCT VFR BLD AUTO: 37.3 % (ref 34–46.6)
HCT VFR BLD AUTO: 38.3 % (ref 34–46.6)
HEMOCCULT STL QL: NEGATIVE
HGB BLD-MCNC: 10 G/DL (ref 12–15.9)
HGB BLD-MCNC: 10.2 G/DL (ref 12–15.9)
HGB BLD-MCNC: 10.3 G/DL (ref 12–15.9)
HGB BLD-MCNC: 10.6 G/DL (ref 12–15.9)
HGB BLD-MCNC: 11 G/DL (ref 12–15.9)
HGB BLD-MCNC: 11.1 G/DL (ref 12–15.9)
HGB BLD-MCNC: 12.1 G/DL (ref 12–15.9)
HGB BLD-MCNC: 12.8 G/DL (ref 12–15.9)
HGB BLD-MCNC: 9.5 G/DL (ref 12–15.9)
HGB BLD-MCNC: 9.7 G/DL (ref 12–15.9)
HGB BLD-MCNC: 9.7 G/DL (ref 12–15.9)
HGB BLD-MCNC: 9.8 G/DL (ref 12–15.9)
HGB BLD-MCNC: 9.9 G/DL (ref 12–15.9)
HGB UR QL STRIP.AUTO: ABNORMAL
HGB UR QL STRIP.AUTO: NEGATIVE
HGB UR QL STRIP.AUTO: NEGATIVE
HOLD SPECIMEN: NORMAL
HYALINE CASTS UR QL AUTO: ABNORMAL /LPF
IMM GRANULOCYTES # BLD AUTO: 0.04 10*3/MM3 (ref 0–0.05)
IMM GRANULOCYTES # BLD AUTO: 0.05 10*3/MM3 (ref 0–0.05)
IMM GRANULOCYTES NFR BLD AUTO: 0.4 % (ref 0–0.5)
IMM GRANULOCYTES NFR BLD AUTO: 0.4 % (ref 0–0.5)
IMM GRANULOCYTES NFR BLD AUTO: 0.5 % (ref 0–0.5)
IMM GRANULOCYTES NFR BLD AUTO: 0.5 % (ref 0–0.5)
IRON 24H UR-MRATE: 18 MCG/DL (ref 37–145)
IRON SATN MFR SERPL: 6 % (ref 20–50)
KETONES UR QL STRIP: NEGATIVE
L PNEUMO1 AG UR QL IA: NEGATIVE
LEUKOCYTE ESTERASE UR QL STRIP.AUTO: ABNORMAL
LEUKOCYTE ESTERASE UR QL STRIP.AUTO: NEGATIVE
LEUKOCYTE ESTERASE UR QL STRIP.AUTO: NEGATIVE
LYMPHOCYTES # BLD AUTO: 1.39 10*3/MM3 (ref 0.7–3.1)
LYMPHOCYTES # BLD AUTO: 1.64 10*3/MM3 (ref 0.7–3.1)
LYMPHOCYTES # BLD AUTO: 1.65 10*3/MM3 (ref 0.7–3.1)
LYMPHOCYTES # BLD AUTO: 1.99 10*3/MM3 (ref 0.7–3.1)
LYMPHOCYTES # BLD MANUAL: 0.11 10*3/MM3 (ref 0.7–3.1)
LYMPHOCYTES NFR BLD AUTO: 12.6 % (ref 19.6–45.3)
LYMPHOCYTES NFR BLD AUTO: 15 % (ref 19.6–45.3)
LYMPHOCYTES NFR BLD AUTO: 20.2 % (ref 19.6–45.3)
LYMPHOCYTES NFR BLD AUTO: 21.1 % (ref 19.6–45.3)
LYMPHOCYTES NFR BLD MANUAL: 2 % (ref 19.6–45.3)
MAGNESIUM SERPL-MCNC: 1.7 MG/DL (ref 1.6–2.4)
MCH RBC QN AUTO: 27.4 PG (ref 26.6–33)
MCH RBC QN AUTO: 27.6 PG (ref 26.6–33)
MCH RBC QN AUTO: 28.1 PG (ref 26.6–33)
MCH RBC QN AUTO: 28.1 PG (ref 26.6–33)
MCH RBC QN AUTO: 28.3 PG (ref 26.6–33)
MCH RBC QN AUTO: 28.7 PG (ref 26.6–33)
MCHC RBC AUTO-ENTMCNC: 31.6 G/DL (ref 31.5–35.7)
MCHC RBC AUTO-ENTMCNC: 32.1 G/DL (ref 31.5–35.7)
MCHC RBC AUTO-ENTMCNC: 32.4 G/DL (ref 31.5–35.7)
MCHC RBC AUTO-ENTMCNC: 32.7 G/DL (ref 31.5–35.7)
MCHC RBC AUTO-ENTMCNC: 33 G/DL (ref 31.5–35.7)
MCHC RBC AUTO-ENTMCNC: 33.4 G/DL (ref 31.5–35.7)
MCV RBC AUTO: 84 FL (ref 79–97)
MCV RBC AUTO: 84.4 FL (ref 79–97)
MCV RBC AUTO: 84.6 FL (ref 79–97)
MCV RBC AUTO: 86.8 FL (ref 79–97)
MCV RBC AUTO: 87.5 FL (ref 79–97)
MCV RBC AUTO: 89.6 FL (ref 79–97)
MODALITY: ABNORMAL
MONOCYTES # BLD AUTO: 0.71 10*3/MM3 (ref 0.1–0.9)
MONOCYTES # BLD AUTO: 0.8 10*3/MM3 (ref 0.1–0.9)
MONOCYTES # BLD AUTO: 0.8 10*3/MM3 (ref 0.1–0.9)
MONOCYTES # BLD AUTO: 0.83 10*3/MM3 (ref 0.1–0.9)
MONOCYTES NFR BLD AUTO: 10.2 % (ref 5–12)
MONOCYTES NFR BLD AUTO: 6.5 % (ref 5–12)
MONOCYTES NFR BLD AUTO: 7.2 % (ref 5–12)
MONOCYTES NFR BLD AUTO: 8.4 % (ref 5–12)
NEUTROPHILS # BLD AUTO: 5.55 10*3/MM3 (ref 1.7–7)
NEUTROPHILS NFR BLD AUTO: 5.23 10*3/MM3 (ref 1.7–7)
NEUTROPHILS NFR BLD AUTO: 6.92 10*3/MM3 (ref 1.7–7)
NEUTROPHILS NFR BLD AUTO: 66.9 % (ref 42.7–76)
NEUTROPHILS NFR BLD AUTO: 70.1 % (ref 42.7–76)
NEUTROPHILS NFR BLD AUTO: 77.6 % (ref 42.7–76)
NEUTROPHILS NFR BLD AUTO: 78.5 % (ref 42.7–76)
NEUTROPHILS NFR BLD AUTO: 8.49 10*3/MM3 (ref 1.7–7)
NEUTROPHILS NFR BLD AUTO: 8.69 10*3/MM3 (ref 1.7–7)
NEUTROPHILS NFR BLD MANUAL: 98 % (ref 42.7–76)
NITRITE UR QL STRIP: NEGATIVE
NOROVIRUS GI+II RNA STL QL NAA+NON-PROBE: NOT DETECTED
NRBC BLD AUTO-RTO: 0 /100 WBC (ref 0–0.2)
NT-PROBNP SERPL-MCNC: 1767 PG/ML (ref 0–1800)
P SHIGELLOIDES DNA STL QL NAA+PROBE: NOT DETECTED
PCO2 BLDA: 35.6 MM HG (ref 35–45)
PH BLDA: 7.46 PH UNITS (ref 7.35–7.45)
PH UR STRIP.AUTO: 5.5 [PH] (ref 4.5–8)
PH UR STRIP.AUTO: 7 [PH] (ref 5–8)
PH UR STRIP.AUTO: 7.5 [PH] (ref 5–8)
PLAT MORPH BLD: NORMAL
PLATELET # BLD AUTO: 289 10*3/MM3 (ref 140–450)
PLATELET # BLD AUTO: 310 10*3/MM3 (ref 140–450)
PLATELET # BLD AUTO: 393 10*3/MM3 (ref 140–450)
PLATELET # BLD AUTO: 410 10*3/MM3 (ref 140–450)
PLATELET # BLD AUTO: 428 10*3/MM3 (ref 140–450)
PLATELET # BLD AUTO: 438 10*3/MM3 (ref 140–450)
PMV BLD AUTO: 8.5 FL (ref 6–12)
PMV BLD AUTO: 8.5 FL (ref 6–12)
PMV BLD AUTO: 8.6 FL (ref 6–12)
PMV BLD AUTO: 8.7 FL (ref 6–12)
PMV BLD AUTO: 9 FL (ref 6–12)
PMV BLD AUTO: 9 FL (ref 6–12)
PO2 BLDA: 58.3 MM HG (ref 80–100)
POTASSIUM SERPL-SCNC: 3.3 MMOL/L (ref 3.5–5.2)
POTASSIUM SERPL-SCNC: 3.4 MMOL/L (ref 3.5–5.2)
POTASSIUM SERPL-SCNC: 3.5 MMOL/L (ref 3.5–5.2)
POTASSIUM SERPL-SCNC: 3.5 MMOL/L (ref 3.5–5.2)
POTASSIUM SERPL-SCNC: 3.9 MMOL/L (ref 3.5–5.2)
POTASSIUM SERPL-SCNC: 4.6 MMOL/L (ref 3.5–5.2)
POTASSIUM SERPL-SCNC: 4.6 MMOL/L (ref 3.5–5.2)
POTASSIUM SERPL-SCNC: 4.9 MMOL/L (ref 3.5–5.2)
PROCALCITONIN SERPL-MCNC: 0.08 NG/ML (ref 0–0.25)
PROT SERPL-MCNC: 6.5 G/DL (ref 6–8.5)
PROT SERPL-MCNC: 6.7 G/DL (ref 6–8.5)
PROT SERPL-MCNC: 7.3 G/DL (ref 6–8.5)
PROT SERPL-MCNC: 7.4 G/DL (ref 6–8.5)
PROT UR QL STRIP: NEGATIVE
RBC # BLD AUTO: 3.49 10*6/MM3 (ref 3.77–5.28)
RBC # BLD AUTO: 3.52 10*6/MM3 (ref 3.77–5.28)
RBC # BLD AUTO: 3.64 10*6/MM3 (ref 3.77–5.28)
RBC # BLD AUTO: 3.98 10*6/MM3 (ref 3.77–5.28)
RBC # BLD AUTO: 4.41 10*6/MM3 (ref 3.77–5.28)
RBC # BLD AUTO: 4.56 10*6/MM3 (ref 3.77–5.28)
RBC # UR: ABNORMAL /HPF
RBC MORPH BLD: NORMAL
REF LAB TEST METHOD: ABNORMAL
RH BLD: POSITIVE
RV RNA STL NAA+PROBE: NOT DETECTED
SALMONELLA DNA SPEC QL NAA+PROBE: NOT DETECTED
SAO2 % BLDCOA: 91.6 % (ref 92–99)
SAPO I+II+IV+V RNA STL QL NAA+NON-PROBE: NOT DETECTED
SARS-COV-2 ORF1AB RESP QL NAA+PROBE: NOT DETECTED
SARS-COV-2 RNA RESP QL NAA+PROBE: NOT DETECTED
SHIGELLA SP+EIEC IPAH STL QL NAA+PROBE: NOT DETECTED
SODIUM SERPL-SCNC: 129 MMOL/L (ref 136–145)
SODIUM SERPL-SCNC: 131 MMOL/L (ref 136–145)
SODIUM SERPL-SCNC: 133 MMOL/L (ref 136–145)
SODIUM SERPL-SCNC: 133 MMOL/L (ref 136–145)
SODIUM SERPL-SCNC: 134 MMOL/L (ref 136–145)
SODIUM SERPL-SCNC: 135 MMOL/L (ref 136–145)
SP GR UR STRIP: 1.01 (ref 1–1.03)
SP GR UR STRIP: 1.01 (ref 1–1.03)
SP GR UR STRIP: 1.02 (ref 1–1.03)
SQUAMOUS #/AREA URNS HPF: ABNORMAL /HPF
T&S EXPIRATION DATE: NORMAL
T3FREE SERPL-MCNC: 1.58 PG/ML (ref 2–4.4)
T4 FREE SERPL-MCNC: 1.35 NG/DL (ref 0.93–1.7)
TIBC SERPL-MCNC: 299 MCG/DL (ref 298–536)
TOTAL RATE: 22 BREATHS/MINUTE
TRANSFERRIN SERPL-MCNC: 201 MG/DL (ref 200–360)
TROPONIN T SERPL-MCNC: <0.01 NG/ML (ref 0–0.03)
TSH SERPL DL<=0.05 MIU/L-ACNC: 0.24 UIU/ML (ref 0.27–4.2)
UROBILINOGEN UR QL STRIP: ABNORMAL
UROBILINOGEN UR QL STRIP: NORMAL
UROBILINOGEN UR QL STRIP: NORMAL
V CHOLERAE DNA SPEC QL NAA+PROBE: NOT DETECTED
VIBRIO DNA SPEC NAA+PROBE: NOT DETECTED
VIT B12 BLD-MCNC: 694 PG/ML (ref 211–946)
WBC # BLD AUTO: 10.93 10*3/MM3 (ref 3.4–10.8)
WBC # BLD AUTO: 11.06 10*3/MM3 (ref 3.4–10.8)
WBC # BLD AUTO: 5.21 10*3/MM3 (ref 3.4–10.8)
WBC # BLD AUTO: 5.66 10*3/MM3 (ref 3.4–10.8)
WBC # BLD AUTO: 7.82 10*3/MM3 (ref 3.4–10.8)
WBC # BLD AUTO: 9.87 10*3/MM3 (ref 3.4–10.8)
WBC MORPH BLD: NORMAL
WBC UR QL AUTO: ABNORMAL /HPF
WHOLE BLOOD HOLD SPECIMEN: NORMAL
YERSINIA STL CULT: NOT DETECTED

## 2020-01-01 PROCEDURE — 36415 COLL VENOUS BLD VENIPUNCTURE: CPT

## 2020-01-01 PROCEDURE — 86900 BLOOD TYPING SEROLOGIC ABO: CPT | Performed by: HOSPITALIST

## 2020-01-01 PROCEDURE — G0378 HOSPITAL OBSERVATION PER HR: HCPCS

## 2020-01-01 PROCEDURE — 85025 COMPLETE CBC W/AUTO DIFF WBC: CPT

## 2020-01-01 PROCEDURE — 94799 UNLISTED PULMONARY SVC/PX: CPT

## 2020-01-01 PROCEDURE — 71045 X-RAY EXAM CHEST 1 VIEW: CPT

## 2020-01-01 PROCEDURE — G0008 ADMIN INFLUENZA VIRUS VAC: HCPCS | Performed by: HOSPITALIST

## 2020-01-01 PROCEDURE — 85025 COMPLETE CBC W/AUTO DIFF WBC: CPT | Performed by: EMERGENCY MEDICINE

## 2020-01-01 PROCEDURE — 85018 HEMOGLOBIN: CPT | Performed by: HOSPITALIST

## 2020-01-01 PROCEDURE — 84466 ASSAY OF TRANSFERRIN: CPT | Performed by: HOSPITALIST

## 2020-01-01 PROCEDURE — 85027 COMPLETE CBC AUTOMATED: CPT | Performed by: NURSE PRACTITIONER

## 2020-01-01 PROCEDURE — 83880 ASSAY OF NATRIURETIC PEPTIDE: CPT | Performed by: EMERGENCY MEDICINE

## 2020-01-01 PROCEDURE — 63710000001 METHYLPREDNISOLONE 4 MG TABLET THERAPY PACK 21 EACH DISP PACK: Performed by: HOSPITALIST

## 2020-01-01 PROCEDURE — 93005 ELECTROCARDIOGRAM TRACING: CPT | Performed by: EMERGENCY MEDICINE

## 2020-01-01 PROCEDURE — 25010000002 METHYLPREDNISOLONE PER 125 MG: Performed by: NURSE PRACTITIONER

## 2020-01-01 PROCEDURE — 76705 ECHO EXAM OF ABDOMEN: CPT

## 2020-01-01 PROCEDURE — 25010000002 METHYLPREDNISOLONE PER 40 MG: Performed by: INTERNAL MEDICINE

## 2020-01-01 PROCEDURE — 82607 VITAMIN B-12: CPT | Performed by: HOSPITALIST

## 2020-01-01 PROCEDURE — 85027 COMPLETE CBC AUTOMATED: CPT | Performed by: HOSPITALIST

## 2020-01-01 PROCEDURE — 99284 EMERGENCY DEPT VISIT MOD MDM: CPT

## 2020-01-01 PROCEDURE — 97530 THERAPEUTIC ACTIVITIES: CPT

## 2020-01-01 PROCEDURE — 93010 ELECTROCARDIOGRAM REPORT: CPT | Performed by: INTERNAL MEDICINE

## 2020-01-01 PROCEDURE — 94640 AIRWAY INHALATION TREATMENT: CPT

## 2020-01-01 PROCEDURE — U0004 COV-19 TEST NON-CDC HGH THRU: HCPCS | Performed by: EMERGENCY MEDICINE

## 2020-01-01 PROCEDURE — 80053 COMPREHEN METABOLIC PANEL: CPT | Performed by: HOSPITALIST

## 2020-01-01 PROCEDURE — 85014 HEMATOCRIT: CPT | Performed by: HOSPITALIST

## 2020-01-01 PROCEDURE — 70450 CT HEAD/BRAIN W/O DYE: CPT

## 2020-01-01 PROCEDURE — 84481 FREE ASSAY (FT-3): CPT | Performed by: HOSPITALIST

## 2020-01-01 PROCEDURE — 80048 BASIC METABOLIC PNL TOTAL CA: CPT | Performed by: HOSPITALIST

## 2020-01-01 PROCEDURE — 93005 ELECTROCARDIOGRAM TRACING: CPT

## 2020-01-01 PROCEDURE — 84132 ASSAY OF SERUM POTASSIUM: CPT | Performed by: HOSPITALIST

## 2020-01-01 PROCEDURE — 99285 EMERGENCY DEPT VISIT HI MDM: CPT

## 2020-01-01 PROCEDURE — 99214 OFFICE O/P EST MOD 30 MIN: CPT | Performed by: INTERNAL MEDICINE

## 2020-01-01 PROCEDURE — 84439 ASSAY OF FREE THYROXINE: CPT | Performed by: HOSPITALIST

## 2020-01-01 PROCEDURE — 85007 BL SMEAR W/DIFF WBC COUNT: CPT | Performed by: HOSPITALIST

## 2020-01-01 PROCEDURE — 63710000001 ONDANSETRON PER 8 MG: Performed by: HOSPITALIST

## 2020-01-01 PROCEDURE — C9803 HOPD COVID-19 SPEC COLLECT: HCPCS

## 2020-01-01 PROCEDURE — 25010000002 IRON SUCROSE PER 1 MG: Performed by: HOSPITALIST

## 2020-01-01 PROCEDURE — 90686 IIV4 VACC NO PRSV 0.5 ML IM: CPT | Performed by: HOSPITALIST

## 2020-01-01 PROCEDURE — 80053 COMPREHEN METABOLIC PANEL: CPT | Performed by: EMERGENCY MEDICINE

## 2020-01-01 PROCEDURE — 99442 PR PHYS/QHP TELEPHONE EVALUATION 11-20 MIN: CPT | Performed by: INTERNAL MEDICINE

## 2020-01-01 PROCEDURE — 87493 C DIFF AMPLIFIED PROBE: CPT | Performed by: HOSPITALIST

## 2020-01-01 PROCEDURE — 71046 X-RAY EXAM CHEST 2 VIEWS: CPT

## 2020-01-01 PROCEDURE — 82272 OCCULT BLD FECES 1-3 TESTS: CPT | Performed by: HOSPITALIST

## 2020-01-01 PROCEDURE — 93970 EXTREMITY STUDY: CPT

## 2020-01-01 PROCEDURE — 84484 ASSAY OF TROPONIN QUANT: CPT | Performed by: EMERGENCY MEDICINE

## 2020-01-01 PROCEDURE — 82728 ASSAY OF FERRITIN: CPT | Performed by: HOSPITALIST

## 2020-01-01 PROCEDURE — 87899 AGENT NOS ASSAY W/OPTIC: CPT | Performed by: INTERNAL MEDICINE

## 2020-01-01 PROCEDURE — 81001 URINALYSIS AUTO W/SCOPE: CPT | Performed by: EMERGENCY MEDICINE

## 2020-01-01 PROCEDURE — 25010000002 INFLUENZA VAC SPLIT QUAD 0.5 ML SUSPENSION PREFILLED SYRINGE: Performed by: HOSPITALIST

## 2020-01-01 PROCEDURE — 84443 ASSAY THYROID STIM HORMONE: CPT | Performed by: HOSPITALIST

## 2020-01-01 PROCEDURE — 83540 ASSAY OF IRON: CPT | Performed by: HOSPITALIST

## 2020-01-01 PROCEDURE — 82803 BLOOD GASES ANY COMBINATION: CPT

## 2020-01-01 PROCEDURE — 36600 WITHDRAWAL OF ARTERIAL BLOOD: CPT

## 2020-01-01 PROCEDURE — 0 IOPAMIDOL PER 1 ML: Performed by: HOSPITALIST

## 2020-01-01 PROCEDURE — 84145 PROCALCITONIN (PCT): CPT | Performed by: EMERGENCY MEDICINE

## 2020-01-01 PROCEDURE — 86901 BLOOD TYPING SEROLOGIC RH(D): CPT | Performed by: HOSPITALIST

## 2020-01-01 PROCEDURE — 99204 OFFICE O/P NEW MOD 45 MIN: CPT | Performed by: INTERNAL MEDICINE

## 2020-01-01 PROCEDURE — 80053 COMPREHEN METABOLIC PANEL: CPT | Performed by: NURSE PRACTITIONER

## 2020-01-01 PROCEDURE — 25010000002 IOPAMIDOL 61 % SOLUTION: Performed by: EMERGENCY MEDICINE

## 2020-01-01 PROCEDURE — 85025 COMPLETE CBC W/AUTO DIFF WBC: CPT | Performed by: HOSPITALIST

## 2020-01-01 PROCEDURE — 87086 URINE CULTURE/COLONY COUNT: CPT | Performed by: EMERGENCY MEDICINE

## 2020-01-01 PROCEDURE — 81003 URINALYSIS AUTO W/O SCOPE: CPT | Performed by: EMERGENCY MEDICINE

## 2020-01-01 PROCEDURE — 99213 OFFICE O/P EST LOW 20 MIN: CPT | Performed by: INTERNAL MEDICINE

## 2020-01-01 PROCEDURE — 97162 PT EVAL MOD COMPLEX 30 MIN: CPT

## 2020-01-01 PROCEDURE — P9612 CATHETERIZE FOR URINE SPEC: HCPCS

## 2020-01-01 PROCEDURE — 82746 ASSAY OF FOLIC ACID SERUM: CPT | Performed by: HOSPITALIST

## 2020-01-01 PROCEDURE — 83735 ASSAY OF MAGNESIUM: CPT | Performed by: HOSPITALIST

## 2020-01-01 PROCEDURE — 81003 URINALYSIS AUTO W/O SCOPE: CPT | Performed by: PHYSICAL MEDICINE & REHABILITATION

## 2020-01-01 PROCEDURE — 71275 CT ANGIOGRAPHY CHEST: CPT

## 2020-01-01 PROCEDURE — 85379 FIBRIN DEGRADATION QUANT: CPT | Performed by: INTERNAL MEDICINE

## 2020-01-01 PROCEDURE — 0097U HC BIOFIRE FILMARRAY GI PANEL: CPT | Performed by: HOSPITALIST

## 2020-01-01 PROCEDURE — 84484 ASSAY OF TROPONIN QUANT: CPT | Performed by: HOSPITALIST

## 2020-01-01 PROCEDURE — 96366 THER/PROPH/DIAG IV INF ADDON: CPT

## 2020-01-01 PROCEDURE — 96365 THER/PROPH/DIAG IV INF INIT: CPT

## 2020-01-01 PROCEDURE — 74177 CT ABD & PELVIS W/CONTRAST: CPT

## 2020-01-01 PROCEDURE — 86850 RBC ANTIBODY SCREEN: CPT | Performed by: HOSPITALIST

## 2020-01-01 RX ORDER — CITALOPRAM 20 MG/1
20 TABLET ORAL DAILY
Status: DISCONTINUED | OUTPATIENT
Start: 2020-01-01 | End: 2021-01-01 | Stop reason: HOSPADM

## 2020-01-01 RX ORDER — CITALOPRAM 20 MG/1
20 TABLET ORAL DAILY
Status: DISCONTINUED | OUTPATIENT
Start: 2020-01-01 | End: 2020-01-01 | Stop reason: HOSPADM

## 2020-01-01 RX ORDER — IPRATROPIUM BROMIDE AND ALBUTEROL SULFATE 2.5; .5 MG/3ML; MG/3ML
3 SOLUTION RESPIRATORY (INHALATION) EVERY 4 HOURS PRN
Status: DISCONTINUED | OUTPATIENT
Start: 2020-01-01 | End: 2020-01-01 | Stop reason: HOSPADM

## 2020-01-01 RX ORDER — ONDANSETRON 2 MG/ML
4 INJECTION INTRAMUSCULAR; INTRAVENOUS EVERY 6 HOURS PRN
Status: DISCONTINUED | OUTPATIENT
Start: 2020-01-01 | End: 2021-01-01 | Stop reason: HOSPADM

## 2020-01-01 RX ORDER — ASPIRIN 81 MG/1
81 TABLET ORAL DAILY
Status: DISCONTINUED | OUTPATIENT
Start: 2020-01-01 | End: 2020-01-01 | Stop reason: HOSPADM

## 2020-01-01 RX ORDER — METHYLPREDNISOLONE 4 MG/1
4 TABLET ORAL
Status: DISCONTINUED | OUTPATIENT
Start: 2020-01-01 | End: 2020-01-01 | Stop reason: HOSPADM

## 2020-01-01 RX ORDER — ONDANSETRON 4 MG/1
4 TABLET, FILM COATED ORAL EVERY 6 HOURS PRN
Status: DISCONTINUED | OUTPATIENT
Start: 2020-01-01 | End: 2021-01-01 | Stop reason: HOSPADM

## 2020-01-01 RX ORDER — POTASSIUM CHLORIDE 750 MG/1
40 CAPSULE, EXTENDED RELEASE ORAL AS NEEDED
Status: DISCONTINUED | OUTPATIENT
Start: 2020-01-01 | End: 2020-01-01 | Stop reason: HOSPADM

## 2020-01-01 RX ORDER — NIFEDIPINE 30 MG/1
30 TABLET, EXTENDED RELEASE ORAL DAILY
Status: DISCONTINUED | OUTPATIENT
Start: 2020-01-01 | End: 2020-01-01 | Stop reason: HOSPADM

## 2020-01-01 RX ORDER — POTASSIUM CHLORIDE 1.5 G/1.77G
40 POWDER, FOR SOLUTION ORAL AS NEEDED
Status: DISCONTINUED | OUTPATIENT
Start: 2020-01-01 | End: 2020-01-01 | Stop reason: HOSPADM

## 2020-01-01 RX ORDER — METHYLPREDNISOLONE 4 MG/1
4 TABLET ORAL
Status: COMPLETED | OUTPATIENT
Start: 2020-01-01 | End: 2020-01-01

## 2020-01-01 RX ORDER — METHYLPREDNISOLONE SODIUM SUCCINATE 40 MG/ML
40 INJECTION, POWDER, LYOPHILIZED, FOR SOLUTION INTRAMUSCULAR; INTRAVENOUS EVERY 12 HOURS
Status: DISCONTINUED | OUTPATIENT
Start: 2020-01-01 | End: 2021-01-01

## 2020-01-01 RX ORDER — CETIRIZINE HYDROCHLORIDE 10 MG/1
10 TABLET ORAL DAILY
Status: DISCONTINUED | OUTPATIENT
Start: 2020-01-01 | End: 2020-01-01 | Stop reason: HOSPADM

## 2020-01-01 RX ORDER — ACETAMINOPHEN 325 MG/1
650 TABLET ORAL EVERY 4 HOURS PRN
Status: DISCONTINUED | OUTPATIENT
Start: 2020-01-01 | End: 2021-01-01 | Stop reason: HOSPADM

## 2020-01-01 RX ORDER — DILTIAZEM HYDROCHLORIDE 120 MG/1
120 CAPSULE, EXTENDED RELEASE ORAL DAILY
COMMUNITY
Start: 2020-01-01

## 2020-01-01 RX ORDER — IPRATROPIUM BROMIDE AND ALBUTEROL SULFATE 2.5; .5 MG/3ML; MG/3ML
3 SOLUTION RESPIRATORY (INHALATION) EVERY 4 HOURS PRN
COMMUNITY

## 2020-01-01 RX ORDER — POTASSIUM CHLORIDE 7.45 MG/ML
10 INJECTION INTRAVENOUS
Status: DISCONTINUED | OUTPATIENT
Start: 2020-01-01 | End: 2020-01-01 | Stop reason: HOSPADM

## 2020-01-01 RX ORDER — GUAIFENESIN 600 MG/1
1200 TABLET, EXTENDED RELEASE ORAL EVERY 12 HOURS SCHEDULED
Status: DISCONTINUED | OUTPATIENT
Start: 2020-01-01 | End: 2021-01-01 | Stop reason: HOSPADM

## 2020-01-01 RX ORDER — METHYLPREDNISOLONE SODIUM SUCCINATE 125 MG/2ML
125 INJECTION, POWDER, LYOPHILIZED, FOR SOLUTION INTRAMUSCULAR; INTRAVENOUS ONCE
Status: COMPLETED | OUTPATIENT
Start: 2020-01-01 | End: 2020-01-01

## 2020-01-01 RX ORDER — ARFORMOTEROL TARTRATE 15 UG/2ML
15 SOLUTION RESPIRATORY (INHALATION)
Status: DISCONTINUED | OUTPATIENT
Start: 2020-01-01 | End: 2021-01-01 | Stop reason: HOSPADM

## 2020-01-01 RX ORDER — BISACODYL 5 MG/1
5 TABLET, DELAYED RELEASE ORAL DAILY PRN
Status: DISCONTINUED | OUTPATIENT
Start: 2020-01-01 | End: 2021-01-01 | Stop reason: HOSPADM

## 2020-01-01 RX ORDER — SODIUM CHLORIDE 0.9 % (FLUSH) 0.9 %
10 SYRINGE (ML) INJECTION AS NEEDED
Status: DISCONTINUED | OUTPATIENT
Start: 2020-01-01 | End: 2020-01-01 | Stop reason: HOSPADM

## 2020-01-01 RX ORDER — BUDESONIDE 1 MG/2ML
1 INHALANT ORAL
Status: DISCONTINUED | OUTPATIENT
Start: 2020-01-01 | End: 2020-01-01 | Stop reason: HOSPADM

## 2020-01-01 RX ORDER — AZITHROMYCIN 250 MG/1
500 TABLET, FILM COATED ORAL
Status: DISCONTINUED | OUTPATIENT
Start: 2020-01-01 | End: 2021-01-01

## 2020-01-01 RX ORDER — SUCRALFATE 1 G/1
1 TABLET ORAL
Status: DISCONTINUED | OUTPATIENT
Start: 2020-01-01 | End: 2021-01-01 | Stop reason: HOSPADM

## 2020-01-01 RX ORDER — ONDANSETRON 4 MG/1
4 TABLET, FILM COATED ORAL EVERY 6 HOURS PRN
Status: DISCONTINUED | OUTPATIENT
Start: 2020-01-01 | End: 2020-01-01 | Stop reason: HOSPADM

## 2020-01-01 RX ORDER — BUDESONIDE 1 MG/2ML
1 INHALANT ORAL
Status: DISCONTINUED | OUTPATIENT
Start: 2020-01-01 | End: 2021-01-01 | Stop reason: HOSPADM

## 2020-01-01 RX ORDER — IPRATROPIUM BROMIDE AND ALBUTEROL SULFATE 2.5; .5 MG/3ML; MG/3ML
3 SOLUTION RESPIRATORY (INHALATION)
Status: DISCONTINUED | OUTPATIENT
Start: 2020-01-01 | End: 2021-01-01 | Stop reason: HOSPADM

## 2020-01-01 RX ORDER — DILTIAZEM HYDROCHLORIDE 120 MG/1
120 CAPSULE, COATED, EXTENDED RELEASE ORAL
Status: DISCONTINUED | OUTPATIENT
Start: 2020-01-01 | End: 2021-01-01 | Stop reason: HOSPADM

## 2020-01-01 RX ORDER — CLONAZEPAM 0.5 MG/1
0.5 TABLET ORAL DAILY
Status: DISCONTINUED | OUTPATIENT
Start: 2020-01-01 | End: 2020-01-01 | Stop reason: HOSPADM

## 2020-01-01 RX ORDER — PANTOPRAZOLE SODIUM 40 MG/1
40 TABLET, DELAYED RELEASE ORAL
Status: DISCONTINUED | OUTPATIENT
Start: 2020-01-01 | End: 2021-01-01 | Stop reason: HOSPADM

## 2020-01-01 RX ORDER — FUROSEMIDE 40 MG/1
40 TABLET ORAL DAILY
Status: DISCONTINUED | OUTPATIENT
Start: 2020-01-01 | End: 2021-01-01

## 2020-01-01 RX ORDER — METHYLPREDNISOLONE SODIUM SUCCINATE 125 MG/2ML
60 INJECTION, POWDER, LYOPHILIZED, FOR SOLUTION INTRAMUSCULAR; INTRAVENOUS EVERY 12 HOURS
Status: DISCONTINUED | OUTPATIENT
Start: 2020-01-01 | End: 2020-01-01

## 2020-01-01 RX ORDER — SODIUM CHLORIDE 0.9 % (FLUSH) 0.9 %
10 SYRINGE (ML) INJECTION AS NEEDED
Status: DISCONTINUED | OUTPATIENT
Start: 2020-01-01 | End: 2021-01-01 | Stop reason: HOSPADM

## 2020-01-01 RX ORDER — PANTOPRAZOLE SODIUM 40 MG/1
40 TABLET, DELAYED RELEASE ORAL
COMMUNITY
Start: 2020-01-01

## 2020-01-01 RX ORDER — CHOLECALCIFEROL (VITAMIN D3) 125 MCG
5 CAPSULE ORAL NIGHTLY
Status: DISCONTINUED | OUTPATIENT
Start: 2020-01-01 | End: 2021-01-01 | Stop reason: HOSPADM

## 2020-01-01 RX ORDER — METHYLPREDNISOLONE 4 MG/1
24 TABLET ORAL ONCE
Status: COMPLETED | OUTPATIENT
Start: 2020-01-01 | End: 2020-01-01

## 2020-01-01 RX ORDER — CEFDINIR 300 MG/1
300 CAPSULE ORAL ONCE
Status: COMPLETED | OUTPATIENT
Start: 2020-01-01 | End: 2020-01-01

## 2020-01-01 RX ORDER — ALUMINA, MAGNESIA, AND SIMETHICONE 2400; 2400; 240 MG/30ML; MG/30ML; MG/30ML
15 SUSPENSION ORAL EVERY 6 HOURS PRN
Status: DISCONTINUED | OUTPATIENT
Start: 2020-01-01 | End: 2021-01-01 | Stop reason: HOSPADM

## 2020-01-01 RX ORDER — ACETAMINOPHEN 325 MG/1
650 TABLET ORAL EVERY 4 HOURS PRN
Status: DISCONTINUED | OUTPATIENT
Start: 2020-01-01 | End: 2020-01-01 | Stop reason: HOSPADM

## 2020-01-01 RX ORDER — FAMOTIDINE 20 MG/1
40 TABLET, FILM COATED ORAL DAILY
Status: DISCONTINUED | OUTPATIENT
Start: 2020-01-01 | End: 2020-01-01

## 2020-01-01 RX ORDER — SODIUM CHLORIDE 0.9 % (FLUSH) 0.9 %
10 SYRINGE (ML) INJECTION EVERY 12 HOURS SCHEDULED
Status: DISCONTINUED | OUTPATIENT
Start: 2020-01-01 | End: 2020-01-01 | Stop reason: HOSPADM

## 2020-01-01 RX ORDER — MONTELUKAST SODIUM 10 MG/1
10 TABLET ORAL NIGHTLY
Status: DISCONTINUED | OUTPATIENT
Start: 2020-01-01 | End: 2021-01-01

## 2020-01-01 RX ORDER — FUROSEMIDE 40 MG/1
40 TABLET ORAL DAILY
Status: DISCONTINUED | OUTPATIENT
Start: 2020-01-01 | End: 2020-01-01 | Stop reason: HOSPADM

## 2020-01-01 RX ORDER — ARFORMOTEROL TARTRATE 15 UG/2ML
15 SOLUTION RESPIRATORY (INHALATION)
Status: DISCONTINUED | OUTPATIENT
Start: 2020-01-01 | End: 2020-01-01 | Stop reason: HOSPADM

## 2020-01-01 RX ORDER — BISACODYL 10 MG
10 SUPPOSITORY, RECTAL RECTAL DAILY PRN
Status: DISCONTINUED | OUTPATIENT
Start: 2020-01-01 | End: 2021-01-01 | Stop reason: HOSPADM

## 2020-01-01 RX ORDER — OXYBUTYNIN CHLORIDE 10 MG/1
10 TABLET, EXTENDED RELEASE ORAL DAILY
COMMUNITY

## 2020-01-01 RX ORDER — SUCRALFATE 1 G/1
1 TABLET ORAL 4 TIMES DAILY
COMMUNITY
Start: 2020-01-01

## 2020-01-01 RX ORDER — POTASSIUM CHLORIDE 750 MG/1
40 TABLET, FILM COATED, EXTENDED RELEASE ORAL ONCE
Status: COMPLETED | OUTPATIENT
Start: 2020-01-01 | End: 2020-01-01

## 2020-01-01 RX ORDER — METHYLPREDNISOLONE 4 MG/1
8 TABLET ORAL
Status: COMPLETED | OUTPATIENT
Start: 2020-01-01 | End: 2020-01-01

## 2020-01-01 RX ORDER — FAMOTIDINE 20 MG/1
40 TABLET, FILM COATED ORAL DAILY
Status: DISCONTINUED | OUTPATIENT
Start: 2020-01-01 | End: 2020-01-01 | Stop reason: HOSPADM

## 2020-01-01 RX ORDER — DIPHENOXYLATE HYDROCHLORIDE AND ATROPINE SULFATE 2.5; .025 MG/1; MG/1
1 TABLET ORAL DAILY
Status: DISCONTINUED | OUTPATIENT
Start: 2020-01-01 | End: 2021-01-01

## 2020-01-01 RX ORDER — LEVOTHYROXINE SODIUM 0.05 MG/1
50 TABLET ORAL
Status: DISCONTINUED | OUTPATIENT
Start: 2020-01-01 | End: 2021-01-01 | Stop reason: HOSPADM

## 2020-01-01 RX ORDER — POLYETHYLENE GLYCOL 3350 17 G/17G
17 POWDER, FOR SOLUTION ORAL DAILY
Status: DISCONTINUED | OUTPATIENT
Start: 2020-01-01 | End: 2020-01-01 | Stop reason: HOSPADM

## 2020-01-01 RX ORDER — MONTELUKAST SODIUM 10 MG/1
10 TABLET ORAL NIGHTLY
COMMUNITY
End: 2021-01-01 | Stop reason: HOSPADM

## 2020-01-01 RX ORDER — LEVOTHYROXINE SODIUM 0.05 MG/1
50 TABLET ORAL
Status: DISCONTINUED | OUTPATIENT
Start: 2020-01-01 | End: 2020-01-01 | Stop reason: HOSPADM

## 2020-01-01 RX ORDER — FLUTICASONE PROPIONATE 50 MCG
2 SPRAY, SUSPENSION (ML) NASAL DAILY
Status: DISCONTINUED | OUTPATIENT
Start: 2020-01-01 | End: 2020-01-01 | Stop reason: HOSPADM

## 2020-01-01 RX ORDER — DOCUSATE SODIUM 100 MG/1
100 CAPSULE, LIQUID FILLED ORAL 2 TIMES DAILY
Status: DISCONTINUED | OUTPATIENT
Start: 2020-01-01 | End: 2020-01-01 | Stop reason: HOSPADM

## 2020-01-01 RX ORDER — OXYBUTYNIN CHLORIDE 10 MG/1
10 TABLET, EXTENDED RELEASE ORAL DAILY
Status: DISCONTINUED | OUTPATIENT
Start: 2020-01-01 | End: 2021-01-01 | Stop reason: HOSPADM

## 2020-01-01 RX ORDER — CEFDINIR 300 MG/1
300 CAPSULE ORAL 2 TIMES DAILY
Qty: 14 CAPSULE | Refills: 0 | Status: SHIPPED | OUTPATIENT
Start: 2020-01-01 | End: 2021-01-01 | Stop reason: HOSPADM

## 2020-01-01 RX ADMIN — METHYLPREDNISOLONE 4 MG: 4 TABLET ORAL at 13:20

## 2020-01-01 RX ADMIN — BUDESONIDE 1 MG: 1 SUSPENSION RESPIRATORY (INHALATION) at 19:44

## 2020-01-01 RX ADMIN — CITALOPRAM 20 MG: 20 TABLET, FILM COATED ORAL at 08:25

## 2020-01-01 RX ADMIN — NIFEDIPINE 30 MG: 30 TABLET, FILM COATED, EXTENDED RELEASE ORAL at 08:25

## 2020-01-01 RX ADMIN — SODIUM CHLORIDE, PRESERVATIVE FREE 10 ML: 5 INJECTION INTRAVENOUS at 10:03

## 2020-01-01 RX ADMIN — DOCUSATE SODIUM 100 MG: 100 CAPSULE, LIQUID FILLED ORAL at 21:17

## 2020-01-01 RX ADMIN — CLONAZEPAM 0.5 MG: 0.5 TABLET ORAL at 08:25

## 2020-01-01 RX ADMIN — ARFORMOTEROL TARTRATE 15 MCG: 15 SOLUTION RESPIRATORY (INHALATION) at 06:32

## 2020-01-01 RX ADMIN — METHYLPREDNISOLONE 4 MG: 4 TABLET ORAL at 13:47

## 2020-01-01 RX ADMIN — ONDANSETRON HYDROCHLORIDE 4 MG: 4 TABLET, FILM COATED ORAL at 15:13

## 2020-01-01 RX ADMIN — ASPIRIN 81 MG: 81 TABLET, COATED ORAL at 08:25

## 2020-01-01 RX ADMIN — METHYLPREDNISOLONE 4 MG: 4 TABLET ORAL at 06:29

## 2020-01-01 RX ADMIN — FLUTICASONE PROPIONATE 2 SPRAY: 50 SPRAY, METERED NASAL at 09:29

## 2020-01-01 RX ADMIN — BUDESONIDE 1 MG: 1 SUSPENSION RESPIRATORY (INHALATION) at 07:25

## 2020-01-01 RX ADMIN — SODIUM CHLORIDE, PRESERVATIVE FREE 10 ML: 5 INJECTION INTRAVENOUS at 21:48

## 2020-01-01 RX ADMIN — SODIUM CHLORIDE, POTASSIUM CHLORIDE, SODIUM LACTATE AND CALCIUM CHLORIDE 1000 ML: 600; 310; 30; 20 INJECTION, SOLUTION INTRAVENOUS at 18:40

## 2020-01-01 RX ADMIN — CITALOPRAM 20 MG: 20 TABLET, FILM COATED ORAL at 09:44

## 2020-01-01 RX ADMIN — IRON SUCROSE 200 MG: 20 INJECTION, SOLUTION INTRAVENOUS at 13:43

## 2020-01-01 RX ADMIN — LEVOTHYROXINE SODIUM 50 MCG: 50 TABLET ORAL at 10:03

## 2020-01-01 RX ADMIN — METHYLPREDNISOLONE 4 MG: 4 TABLET ORAL at 17:15

## 2020-01-01 RX ADMIN — SODIUM CHLORIDE, PRESERVATIVE FREE 10 ML: 5 INJECTION INTRAVENOUS at 22:00

## 2020-01-01 RX ADMIN — BUDESONIDE 1 MG: 1 SUSPENSION RESPIRATORY (INHALATION) at 11:43

## 2020-01-01 RX ADMIN — ARFORMOTEROL TARTRATE 15 MCG: 15 SOLUTION RESPIRATORY (INHALATION) at 19:44

## 2020-01-01 RX ADMIN — LEVOTHYROXINE SODIUM 50 MCG: 50 TABLET ORAL at 06:32

## 2020-01-01 RX ADMIN — ONDANSETRON HYDROCHLORIDE 4 MG: 4 TABLET, FILM COATED ORAL at 14:54

## 2020-01-01 RX ADMIN — PANTOPRAZOLE SODIUM 40 MG: 40 TABLET, DELAYED RELEASE ORAL at 06:49

## 2020-01-01 RX ADMIN — ARFORMOTEROL TARTRATE 15 MCG: 15 SOLUTION RESPIRATORY (INHALATION) at 20:36

## 2020-01-01 RX ADMIN — APIXABAN 5 MG: 5 TABLET, FILM COATED ORAL at 10:03

## 2020-01-01 RX ADMIN — IPRATROPIUM BROMIDE AND ALBUTEROL SULFATE 3 ML: 2.5; .5 SOLUTION RESPIRATORY (INHALATION) at 04:40

## 2020-01-01 RX ADMIN — Medication 5 MG: at 20:47

## 2020-01-01 RX ADMIN — GUAIFENESIN 1200 MG: 600 TABLET, EXTENDED RELEASE ORAL at 20:46

## 2020-01-01 RX ADMIN — LEVOTHYROXINE SODIUM 50 MCG: 50 TABLET ORAL at 06:01

## 2020-01-01 RX ADMIN — GUAIFENESIN 1200 MG: 600 TABLET, EXTENDED RELEASE ORAL at 10:02

## 2020-01-01 RX ADMIN — ACETAMINOPHEN 650 MG: 325 TABLET, FILM COATED ORAL at 18:57

## 2020-01-01 RX ADMIN — FUROSEMIDE 40 MG: 40 TABLET ORAL at 10:03

## 2020-01-01 RX ADMIN — METHYLPREDNISOLONE 4 MG: 4 TABLET ORAL at 13:28

## 2020-01-01 RX ADMIN — METHYLPREDNISOLONE SODIUM SUCCINATE 125 MG: 125 INJECTION, POWDER, FOR SOLUTION INTRAMUSCULAR; INTRAVENOUS at 19:08

## 2020-01-01 RX ADMIN — ACETAMINOPHEN 650 MG: 325 TABLET, FILM COATED ORAL at 13:20

## 2020-01-01 RX ADMIN — SUCRALFATE 1 G: 1 TABLET ORAL at 20:47

## 2020-01-01 RX ADMIN — ARFORMOTEROL TARTRATE 15 MCG: 15 SOLUTION RESPIRATORY (INHALATION) at 07:44

## 2020-01-01 RX ADMIN — INFLUENZA VIRUS VACCINE 0.5 ML: 15; 15; 15; 15 SUSPENSION INTRAMUSCULAR at 13:28

## 2020-01-01 RX ADMIN — ARFORMOTEROL TARTRATE 15 MCG: 15 SOLUTION RESPIRATORY (INHALATION) at 11:42

## 2020-01-01 RX ADMIN — SODIUM CHLORIDE, PRESERVATIVE FREE 10 ML: 5 INJECTION INTRAVENOUS at 09:31

## 2020-01-01 RX ADMIN — FUROSEMIDE 40 MG: 40 TABLET ORAL at 08:25

## 2020-01-01 RX ADMIN — METHYLPREDNISOLONE 4 MG: 4 TABLET ORAL at 09:43

## 2020-01-01 RX ADMIN — DILTIAZEM HYDROCHLORIDE 120 MG: 120 CAPSULE, COATED, EXTENDED RELEASE ORAL at 10:03

## 2020-01-01 RX ADMIN — CLONAZEPAM 0.5 MG: 0.5 TABLET ORAL at 09:43

## 2020-01-01 RX ADMIN — METHYLPREDNISOLONE 4 MG: 4 TABLET ORAL at 06:33

## 2020-01-01 RX ADMIN — FAMOTIDINE 40 MG: 20 TABLET, FILM COATED ORAL at 08:44

## 2020-01-01 RX ADMIN — CEFDINIR 300 MG: 300 CAPSULE ORAL at 12:49

## 2020-01-01 RX ADMIN — METHYLPREDNISOLONE SODIUM SUCCINATE 40 MG: 40 INJECTION, POWDER, FOR SOLUTION INTRAMUSCULAR; INTRAVENOUS at 20:48

## 2020-01-01 RX ADMIN — LEVOTHYROXINE SODIUM 50 MCG: 50 TABLET ORAL at 06:27

## 2020-01-01 RX ADMIN — ACETAMINOPHEN 650 MG: 325 TABLET, FILM COATED ORAL at 22:10

## 2020-01-01 RX ADMIN — METHYLPREDNISOLONE SODIUM SUCCINATE 60 MG: 125 INJECTION, POWDER, FOR SOLUTION INTRAMUSCULAR; INTRAVENOUS at 10:02

## 2020-01-01 RX ADMIN — CLONAZEPAM 0.5 MG: 0.5 TABLET ORAL at 09:30

## 2020-01-01 RX ADMIN — POTASSIUM CHLORIDE 40 MEQ: 10 CAPSULE, COATED, EXTENDED RELEASE ORAL at 21:58

## 2020-01-01 RX ADMIN — METHYLPREDNISOLONE 24 MG: 4 TABLET ORAL at 01:12

## 2020-01-01 RX ADMIN — OXYBUTYNIN CHLORIDE 10 MG: 10 TABLET, EXTENDED RELEASE ORAL at 10:02

## 2020-01-01 RX ADMIN — Medication 1 TABLET: at 10:03

## 2020-01-01 RX ADMIN — CITALOPRAM 20 MG: 20 TABLET, FILM COATED ORAL at 09:30

## 2020-01-01 RX ADMIN — ARFORMOTEROL TARTRATE 15 MCG: 15 SOLUTION RESPIRATORY (INHALATION) at 20:42

## 2020-01-01 RX ADMIN — FUROSEMIDE 40 MG: 40 TABLET ORAL at 09:31

## 2020-01-01 RX ADMIN — ACETAMINOPHEN 650 MG: 325 TABLET, FILM COATED ORAL at 23:02

## 2020-01-01 RX ADMIN — NIFEDIPINE 30 MG: 30 TABLET, FILM COATED, EXTENDED RELEASE ORAL at 09:44

## 2020-01-01 RX ADMIN — SODIUM CHLORIDE, PRESERVATIVE FREE 10 ML: 5 INJECTION INTRAVENOUS at 08:25

## 2020-01-01 RX ADMIN — CITALOPRAM 20 MG: 20 TABLET, FILM COATED ORAL at 08:44

## 2020-01-01 RX ADMIN — ARFORMOTEROL TARTRATE 15 MCG: 15 SOLUTION RESPIRATORY (INHALATION) at 07:38

## 2020-01-01 RX ADMIN — CLONAZEPAM 0.5 MG: 0.5 TABLET ORAL at 08:44

## 2020-01-01 RX ADMIN — METHYLPREDNISOLONE 4 MG: 4 TABLET ORAL at 17:27

## 2020-01-01 RX ADMIN — IRON SUCROSE 200 MG: 20 INJECTION, SOLUTION INTRAVENOUS at 15:18

## 2020-01-01 RX ADMIN — BUDESONIDE 1 MG: 1 SUSPENSION RESPIRATORY (INHALATION) at 06:30

## 2020-01-01 RX ADMIN — ASPIRIN 81 MG: 81 TABLET, COATED ORAL at 08:45

## 2020-01-01 RX ADMIN — FLUTICASONE PROPIONATE 2 SPRAY: 50 SPRAY, METERED NASAL at 09:48

## 2020-01-01 RX ADMIN — CETIRIZINE HYDROCHLORIDE 10 MG: 10 TABLET, FILM COATED ORAL at 08:25

## 2020-01-01 RX ADMIN — IPRATROPIUM BROMIDE AND ALBUTEROL SULFATE 3 ML: 2.5; .5 SOLUTION RESPIRATORY (INHALATION) at 07:22

## 2020-01-01 RX ADMIN — IOPAMIDOL 85 ML: 612 INJECTION, SOLUTION INTRAVENOUS at 18:59

## 2020-01-01 RX ADMIN — SODIUM CHLORIDE, PRESERVATIVE FREE 10 ML: 5 INJECTION INTRAVENOUS at 23:15

## 2020-01-01 RX ADMIN — DOCUSATE SODIUM 100 MG: 100 CAPSULE, LIQUID FILLED ORAL at 21:54

## 2020-01-01 RX ADMIN — METHYLPREDNISOLONE 4 MG: 4 TABLET ORAL at 08:25

## 2020-01-01 RX ADMIN — SODIUM CHLORIDE, PRESERVATIVE FREE 10 ML: 5 INJECTION INTRAVENOUS at 21:17

## 2020-01-01 RX ADMIN — SODIUM CHLORIDE, PRESERVATIVE FREE 10 ML: 5 INJECTION INTRAVENOUS at 08:46

## 2020-01-01 RX ADMIN — NIFEDIPINE 30 MG: 30 TABLET, FILM COATED, EXTENDED RELEASE ORAL at 08:44

## 2020-01-01 RX ADMIN — FAMOTIDINE 40 MG: 20 TABLET, FILM COATED ORAL at 09:44

## 2020-01-01 RX ADMIN — FAMOTIDINE 40 MG: 20 TABLET, FILM COATED ORAL at 08:25

## 2020-01-01 RX ADMIN — FLUTICASONE PROPIONATE 2 SPRAY: 50 SPRAY, METERED NASAL at 08:45

## 2020-01-01 RX ADMIN — CITALOPRAM 20 MG: 20 TABLET, FILM COATED ORAL at 10:03

## 2020-01-01 RX ADMIN — METHYLPREDNISOLONE 4 MG: 4 TABLET ORAL at 20:35

## 2020-01-01 RX ADMIN — SUCRALFATE 1 G: 1 TABLET ORAL at 10:02

## 2020-01-01 RX ADMIN — SODIUM CHLORIDE, PRESERVATIVE FREE 10 ML: 5 INJECTION INTRAVENOUS at 09:44

## 2020-01-01 RX ADMIN — FUROSEMIDE 40 MG: 40 TABLET ORAL at 09:44

## 2020-01-01 RX ADMIN — FAMOTIDINE 40 MG: 20 TABLET, FILM COATED ORAL at 09:30

## 2020-01-01 RX ADMIN — DOCUSATE SODIUM 100 MG: 100 CAPSULE, LIQUID FILLED ORAL at 08:45

## 2020-01-01 RX ADMIN — ASPIRIN 81 MG: 81 TABLET, COATED ORAL at 09:43

## 2020-01-01 RX ADMIN — METHYLPREDNISOLONE 4 MG: 4 TABLET ORAL at 18:38

## 2020-01-01 RX ADMIN — ACETAMINOPHEN 650 MG: 325 TABLET, FILM COATED ORAL at 04:14

## 2020-01-01 RX ADMIN — CETIRIZINE HYDROCHLORIDE 10 MG: 10 TABLET, FILM COATED ORAL at 08:45

## 2020-01-01 RX ADMIN — FLUTICASONE PROPIONATE 2 SPRAY: 50 SPRAY, METERED NASAL at 08:25

## 2020-01-01 RX ADMIN — POLYETHYLENE GLYCOL 3350 17 G: 17 POWDER, FOR SOLUTION ORAL at 08:44

## 2020-01-01 RX ADMIN — NIFEDIPINE 30 MG: 30 TABLET, FILM COATED, EXTENDED RELEASE ORAL at 09:30

## 2020-01-01 RX ADMIN — LEVOTHYROXINE SODIUM 50 MCG: 50 TABLET ORAL at 09:48

## 2020-01-01 RX ADMIN — ARFORMOTEROL TARTRATE 15 MCG: 15 SOLUTION RESPIRATORY (INHALATION) at 20:05

## 2020-01-01 RX ADMIN — CETIRIZINE HYDROCHLORIDE 10 MG: 10 TABLET, FILM COATED ORAL at 09:30

## 2020-01-01 RX ADMIN — FUROSEMIDE 40 MG: 40 TABLET ORAL at 08:44

## 2020-01-01 RX ADMIN — IOPAMIDOL 95 ML: 755 INJECTION, SOLUTION INTRAVENOUS at 09:00

## 2020-01-01 RX ADMIN — POTASSIUM CHLORIDE 40 MEQ: 750 TABLET, EXTENDED RELEASE ORAL at 02:47

## 2020-01-01 RX ADMIN — METHYLPREDNISOLONE 8 MG: 4 TABLET ORAL at 21:54

## 2020-01-01 RX ADMIN — BUDESONIDE 1 MG: 1 SUSPENSION RESPIRATORY (INHALATION) at 20:35

## 2020-01-01 RX ADMIN — SUCRALFATE 1 G: 1 TABLET ORAL at 17:05

## 2020-01-01 RX ADMIN — MONTELUKAST SODIUM 10 MG: 10 TABLET, FILM COATED ORAL at 20:47

## 2020-01-01 RX ADMIN — ACETAMINOPHEN 650 MG: 325 TABLET, FILM COATED ORAL at 14:54

## 2020-01-01 RX ADMIN — APIXABAN 5 MG: 5 TABLET, FILM COATED ORAL at 20:47

## 2020-01-01 RX ADMIN — BUDESONIDE 1 MG: 1 SUSPENSION RESPIRATORY (INHALATION) at 07:50

## 2020-01-01 RX ADMIN — BUDESONIDE 1 MG: 1 SUSPENSION RESPIRATORY (INHALATION) at 20:42

## 2020-01-01 RX ADMIN — CETIRIZINE HYDROCHLORIDE 10 MG: 10 TABLET, FILM COATED ORAL at 09:44

## 2020-01-01 RX ADMIN — POTASSIUM CHLORIDE 40 MEQ: 10 CAPSULE, COATED, EXTENDED RELEASE ORAL at 17:35

## 2020-01-01 RX ADMIN — ASPIRIN 81 MG: 81 TABLET, COATED ORAL at 09:30

## 2020-01-01 RX ADMIN — AZITHROMYCIN DIHYDRATE 500 MG: 250 TABLET, FILM COATED ORAL at 21:32

## 2020-01-24 ENCOUNTER — APPOINTMENT (OUTPATIENT)
Dept: CT IMAGING | Facility: HOSPITAL | Age: 85
End: 2020-01-24

## 2020-01-24 ENCOUNTER — HOSPITAL ENCOUNTER (INPATIENT)
Facility: HOSPITAL | Age: 85
LOS: 3 days | Discharge: HOME HEALTH | End: 2020-01-27
Attending: EMERGENCY MEDICINE | Admitting: INTERNAL MEDICINE

## 2020-01-24 ENCOUNTER — APPOINTMENT (OUTPATIENT)
Dept: GENERAL RADIOLOGY | Facility: HOSPITAL | Age: 85
End: 2020-01-24

## 2020-01-24 ENCOUNTER — APPOINTMENT (OUTPATIENT)
Dept: CARDIOLOGY | Facility: HOSPITAL | Age: 85
End: 2020-01-24

## 2020-01-24 DIAGNOSIS — A41.9 SEPSIS WITHOUT ACUTE ORGAN DYSFUNCTION, DUE TO UNSPECIFIED ORGANISM (HCC): Primary | ICD-10-CM

## 2020-01-24 DIAGNOSIS — R10.84 GENERALIZED ABDOMINAL PAIN: ICD-10-CM

## 2020-01-24 DIAGNOSIS — N39.0 ACUTE UTI: ICD-10-CM

## 2020-01-24 PROBLEM — E87.1 HYPONATREMIA: Status: ACTIVE | Noted: 2020-01-24

## 2020-01-24 PROBLEM — I50.32 CHRONIC DIASTOLIC CHF (CONGESTIVE HEART FAILURE) (HCC): Status: ACTIVE | Noted: 2020-01-24

## 2020-01-24 PROBLEM — E03.9 ACQUIRED HYPOTHYROIDISM: Status: ACTIVE | Noted: 2020-01-24

## 2020-01-24 LAB
ADV 40+41 DNA STL QL NAA+NON-PROBE: NOT DETECTED
ALBUMIN SERPL-MCNC: 4.8 G/DL (ref 3.5–5.2)
ALBUMIN/GLOB SERPL: 1.2 G/DL
ALP SERPL-CCNC: 74 U/L (ref 39–117)
ALT SERPL W P-5'-P-CCNC: 11 U/L (ref 1–33)
ANION GAP SERPL CALCULATED.3IONS-SCNC: 14.3 MMOL/L (ref 5–15)
ANION GAP SERPL CALCULATED.3IONS-SCNC: 16.2 MMOL/L (ref 5–15)
AST SERPL-CCNC: 20 U/L (ref 1–32)
ASTRO TYP 1-8 RNA STL QL NAA+NON-PROBE: NOT DETECTED
B PARAPERT DNA SPEC QL NAA+PROBE: NOT DETECTED
B PERT DNA SPEC QL NAA+PROBE: NOT DETECTED
BACTERIA UR QL AUTO: ABNORMAL /HPF
BASOPHILS # BLD AUTO: 0.01 10*3/MM3 (ref 0–0.2)
BASOPHILS # BLD AUTO: 0.03 10*3/MM3 (ref 0–0.2)
BASOPHILS NFR BLD AUTO: 0.1 % (ref 0–1.5)
BASOPHILS NFR BLD AUTO: 0.2 % (ref 0–1.5)
BH CV ECHO MEAS - ACS: 2 CM
BH CV ECHO MEAS - AO MAX PG (FULL): 2.1 MMHG
BH CV ECHO MEAS - AO MAX PG: 11 MMHG
BH CV ECHO MEAS - AO MEAN PG (FULL): 0 MMHG
BH CV ECHO MEAS - AO MEAN PG: 5 MMHG
BH CV ECHO MEAS - AO ROOT AREA (BSA CORRECTED): 1.4
BH CV ECHO MEAS - AO ROOT AREA: 6.6 CM^2
BH CV ECHO MEAS - AO ROOT DIAM: 2.9 CM
BH CV ECHO MEAS - AO V2 MAX: 166 CM/SEC
BH CV ECHO MEAS - AO V2 MEAN: 105 CM/SEC
BH CV ECHO MEAS - AO V2 VTI: 27.2 CM
BH CV ECHO MEAS - ASC AORTA: 3.6 CM
BH CV ECHO MEAS - AVA(I,A): 2.2 CM^2
BH CV ECHO MEAS - AVA(I,D): 2.2 CM^2
BH CV ECHO MEAS - AVA(V,A): 2.3 CM^2
BH CV ECHO MEAS - AVA(V,D): 2.3 CM^2
BH CV ECHO MEAS - BSA(HAYCOCK): 2.1 M^2
BH CV ECHO MEAS - BSA: 2 M^2
BH CV ECHO MEAS - BZI_BMI: 29.2 KILOGRAMS/M^2
BH CV ECHO MEAS - BZI_METRIC_HEIGHT: 172.7 CM
BH CV ECHO MEAS - BZI_METRIC_WEIGHT: 87.1 KG
BH CV ECHO MEAS - EDV(CUBED): 69.4 ML
BH CV ECHO MEAS - EDV(MOD-SP2): 98.4 ML
BH CV ECHO MEAS - EDV(MOD-SP4): 102 ML
BH CV ECHO MEAS - EDV(TEICH): 74.7 ML
BH CV ECHO MEAS - EF(CUBED): 74.7 %
BH CV ECHO MEAS - EF(MOD-BP): 78 %
BH CV ECHO MEAS - EF(MOD-SP2): 79.9 %
BH CV ECHO MEAS - EF(MOD-SP4): 76.5 %
BH CV ECHO MEAS - EF(TEICH): 67 %
BH CV ECHO MEAS - ESV(CUBED): 17.6 ML
BH CV ECHO MEAS - ESV(MOD-SP2): 19.8 ML
BH CV ECHO MEAS - ESV(MOD-SP4): 24 ML
BH CV ECHO MEAS - ESV(TEICH): 24.6 ML
BH CV ECHO MEAS - FS: 36.7 %
BH CV ECHO MEAS - IVS/LVPW: 1.2
BH CV ECHO MEAS - IVSD: 0.96 CM
BH CV ECHO MEAS - LAT PEAK E' VEL: 11 CM/SEC
BH CV ECHO MEAS - LV DIASTOLIC VOL/BSA (35-75): 50.8 ML/M^2
BH CV ECHO MEAS - LV MASS(C)D: 112.4 GRAMS
BH CV ECHO MEAS - LV MASS(C)DI: 56 GRAMS/M^2
BH CV ECHO MEAS - LV MAX PG: 8.9 MMHG
BH CV ECHO MEAS - LV MEAN PG: 5 MMHG
BH CV ECHO MEAS - LV SYSTOLIC VOL/BSA (12-30): 11.9 ML/M^2
BH CV ECHO MEAS - LV V1 MAX: 149 CM/SEC
BH CV ECHO MEAS - LV V1 MEAN: 103 CM/SEC
BH CV ECHO MEAS - LV V1 VTI: 23.5 CM
BH CV ECHO MEAS - LVIDD: 4.1 CM
BH CV ECHO MEAS - LVIDS: 2.6 CM
BH CV ECHO MEAS - LVLD AP2: 7.7 CM
BH CV ECHO MEAS - LVLD AP4: 7.3 CM
BH CV ECHO MEAS - LVLS AP2: 5.2 CM
BH CV ECHO MEAS - LVLS AP4: 5.6 CM
BH CV ECHO MEAS - LVOT AREA (M): 2.5 CM^2
BH CV ECHO MEAS - LVOT AREA: 2.5 CM^2
BH CV ECHO MEAS - LVOT DIAM: 1.8 CM
BH CV ECHO MEAS - LVPWD: 0.82 CM
BH CV ECHO MEAS - MED PEAK E' VEL: 8 CM/SEC
BH CV ECHO MEAS - MV A DUR: 0.16 SEC
BH CV ECHO MEAS - MV A MAX VEL: 120 CM/SEC
BH CV ECHO MEAS - MV DEC SLOPE: 313 CM/SEC^2
BH CV ECHO MEAS - MV DEC TIME: 195 SEC
BH CV ECHO MEAS - MV E MAX VEL: 95.1 CM/SEC
BH CV ECHO MEAS - MV E/A: 0.79
BH CV ECHO MEAS - MV MAX PG: 6.1 MMHG
BH CV ECHO MEAS - MV MEAN PG: 3 MMHG
BH CV ECHO MEAS - MV P1/2T MAX VEL: 88.6 CM/SEC
BH CV ECHO MEAS - MV P1/2T: 82.9 MSEC
BH CV ECHO MEAS - MV V2 MAX: 123 CM/SEC
BH CV ECHO MEAS - MV V2 MEAN: 83.7 CM/SEC
BH CV ECHO MEAS - MV V2 VTI: 23.3 CM
BH CV ECHO MEAS - MVA P1/2T LCG: 2.5 CM^2
BH CV ECHO MEAS - MVA(P1/2T): 2.7 CM^2
BH CV ECHO MEAS - MVA(VTI): 2.6 CM^2
BH CV ECHO MEAS - PA ACC TIME: 0.11 SEC
BH CV ECHO MEAS - PA MAX PG (FULL): 3.9 MMHG
BH CV ECHO MEAS - PA MAX PG: 6.5 MMHG
BH CV ECHO MEAS - PA PR(ACCEL): 30.4 MMHG
BH CV ECHO MEAS - PA V2 MAX: 127 CM/SEC
BH CV ECHO MEAS - PULM DIAS VEL: 40.8 CM/SEC
BH CV ECHO MEAS - PULM S/D: 1.2
BH CV ECHO MEAS - PULM SYS VEL: 47 CM/SEC
BH CV ECHO MEAS - RAP SYSTOLE: 8 MMHG
BH CV ECHO MEAS - RV MAX PG: 2.5 MMHG
BH CV ECHO MEAS - RV MEAN PG: 1 MMHG
BH CV ECHO MEAS - RV V1 MAX: 79.1 CM/SEC
BH CV ECHO MEAS - RV V1 MEAN: 50.3 CM/SEC
BH CV ECHO MEAS - RV V1 VTI: 12.8 CM
BH CV ECHO MEAS - RVSP: 31 MMHG
BH CV ECHO MEAS - SI(AO): 89.4 ML/M^2
BH CV ECHO MEAS - SI(CUBED): 25.8 ML/M^2
BH CV ECHO MEAS - SI(LVOT): 29.8 ML/M^2
BH CV ECHO MEAS - SI(MOD-SP2): 39.1 ML/M^2
BH CV ECHO MEAS - SI(MOD-SP4): 38.8 ML/M^2
BH CV ECHO MEAS - SI(TEICH): 24.9 ML/M^2
BH CV ECHO MEAS - SV(AO): 179.7 ML
BH CV ECHO MEAS - SV(CUBED): 51.9 ML
BH CV ECHO MEAS - SV(LVOT): 59.8 ML
BH CV ECHO MEAS - SV(MOD-SP2): 78.6 ML
BH CV ECHO MEAS - SV(MOD-SP4): 78 ML
BH CV ECHO MEAS - SV(TEICH): 50 ML
BH CV ECHO MEAS - TAPSE (>1.6): 1.6 CM
BH CV ECHO MEAS - TR MAX VEL: 214 CM/SEC
BH CV ECHO MEASUREMENTS AVERAGE E/E' RATIO: 10.01
BH CV VAS BP RIGHT ARM: NORMAL MMHG
BH CV XLRA - RV BASE: 3.1 CM
BH CV XLRA - TDI S': 10 CM/SEC
BILIRUB SERPL-MCNC: 1 MG/DL (ref 0.2–1.2)
BILIRUB UR QL STRIP: NEGATIVE
BUN BLD-MCNC: 15 MG/DL (ref 8–23)
BUN BLD-MCNC: 17 MG/DL (ref 8–23)
BUN/CREAT SERPL: 15.5 (ref 7–25)
BUN/CREAT SERPL: 17.9 (ref 7–25)
C CAYETANENSIS DNA STL QL NAA+NON-PROBE: NOT DETECTED
C DIFF GDH STL QL: NEGATIVE
C PNEUM DNA NPH QL NAA+NON-PROBE: NOT DETECTED
CALCIUM SPEC-SCNC: 8.8 MG/DL (ref 8.6–10.5)
CALCIUM SPEC-SCNC: 9.9 MG/DL (ref 8.6–10.5)
CAMPY SP DNA.DIARRHEA STL QL NAA+PROBE: NOT DETECTED
CHLORIDE SERPL-SCNC: 88 MMOL/L (ref 98–107)
CHLORIDE SERPL-SCNC: 91 MMOL/L (ref 98–107)
CLARITY UR: ABNORMAL
CO2 SERPL-SCNC: 25.7 MMOL/L (ref 22–29)
CO2 SERPL-SCNC: 26.8 MMOL/L (ref 22–29)
COLOR UR: YELLOW
CREAT BLD-MCNC: 0.95 MG/DL (ref 0.57–1)
CREAT BLD-MCNC: 0.97 MG/DL (ref 0.57–1)
CRYPTOSP STL CULT: NOT DETECTED
D-LACTATE SERPL-SCNC: 2.6 MMOL/L (ref 0.5–2)
D-LACTATE SERPL-SCNC: 3.2 MMOL/L (ref 0.5–2)
D-LACTATE SERPL-SCNC: 3.9 MMOL/L (ref 0.5–2)
DEPRECATED RDW RBC AUTO: 42.4 FL (ref 37–54)
DEPRECATED RDW RBC AUTO: 44.2 FL (ref 37–54)
E COLI DNA SPEC QL NAA+PROBE: NOT DETECTED
E HISTOLYT AG STL-ACNC: NOT DETECTED
EAEC PAA PLAS AGGR+AATA ST NAA+NON-PRB: NOT DETECTED
EC STX1 + STX2 GENES STL NAA+PROBE: NOT DETECTED
EOSINOPHIL # BLD AUTO: 0.01 10*3/MM3 (ref 0–0.4)
EOSINOPHIL # BLD AUTO: 0.15 10*3/MM3 (ref 0–0.4)
EOSINOPHIL NFR BLD AUTO: 0.1 % (ref 0.3–6.2)
EOSINOPHIL NFR BLD AUTO: 1 % (ref 0.3–6.2)
EPEC EAE GENE STL QL NAA+NON-PROBE: NOT DETECTED
ERYTHROCYTE [DISTWIDTH] IN BLOOD BY AUTOMATED COUNT: 12.6 % (ref 12.3–15.4)
ERYTHROCYTE [DISTWIDTH] IN BLOOD BY AUTOMATED COUNT: 12.7 % (ref 12.3–15.4)
ETEC LTA+ST1A+ST1B TOX ST NAA+NON-PROBE: NOT DETECTED
FLUAV AG NPH QL: NEGATIVE
FLUAV H1 2009 PAND RNA NPH QL NAA+PROBE: NOT DETECTED
FLUAV H1 HA GENE NPH QL NAA+PROBE: NOT DETECTED
FLUAV H3 RNA NPH QL NAA+PROBE: NOT DETECTED
FLUAV SUBTYP SPEC NAA+PROBE: NOT DETECTED
FLUBV AG NPH QL IA: NEGATIVE
FLUBV RNA ISLT QL NAA+PROBE: NOT DETECTED
G LAMBLIA DNA SPEC QL NAA+PROBE: NOT DETECTED
GFR SERPL CREATININE-BSD FRML MDRD: 54 ML/MIN/1.73
GFR SERPL CREATININE-BSD FRML MDRD: 56 ML/MIN/1.73
GLOBULIN UR ELPH-MCNC: 4.1 GM/DL
GLUCOSE BLD-MCNC: 122 MG/DL (ref 65–99)
GLUCOSE BLD-MCNC: 152 MG/DL (ref 65–99)
GLUCOSE UR STRIP-MCNC: NEGATIVE MG/DL
HADV DNA SPEC NAA+PROBE: NOT DETECTED
HCOV 229E RNA SPEC QL NAA+PROBE: NOT DETECTED
HCOV HKU1 RNA SPEC QL NAA+PROBE: NOT DETECTED
HCOV NL63 RNA SPEC QL NAA+PROBE: NOT DETECTED
HCOV OC43 RNA SPEC QL NAA+PROBE: NOT DETECTED
HCT VFR BLD AUTO: 40.8 % (ref 34–46.6)
HCT VFR BLD AUTO: 43.2 % (ref 34–46.6)
HGB BLD-MCNC: 13.3 G/DL (ref 12–15.9)
HGB BLD-MCNC: 14.3 G/DL (ref 12–15.9)
HGB UR QL STRIP.AUTO: ABNORMAL
HMPV RNA NPH QL NAA+NON-PROBE: NOT DETECTED
HOLD SPECIMEN: NORMAL
HPIV1 RNA SPEC QL NAA+PROBE: NOT DETECTED
HPIV2 RNA SPEC QL NAA+PROBE: NOT DETECTED
HPIV3 RNA NPH QL NAA+PROBE: NOT DETECTED
HPIV4 P GENE NPH QL NAA+PROBE: NOT DETECTED
HYALINE CASTS UR QL AUTO: ABNORMAL /LPF
IMM GRANULOCYTES # BLD AUTO: 0.01 10*3/MM3 (ref 0–0.05)
IMM GRANULOCYTES # BLD AUTO: 0.05 10*3/MM3 (ref 0–0.05)
IMM GRANULOCYTES NFR BLD AUTO: 0.1 % (ref 0–0.5)
IMM GRANULOCYTES NFR BLD AUTO: 0.3 % (ref 0–0.5)
KETONES UR QL STRIP: NEGATIVE
LEFT ATRIUM VOLUME INDEX: 28 ML/M2
LEUKOCYTE ESTERASE UR QL STRIP.AUTO: ABNORMAL
LYMPHOCYTES # BLD AUTO: 0.33 10*3/MM3 (ref 0.7–3.1)
LYMPHOCYTES # BLD AUTO: 0.77 10*3/MM3 (ref 0.7–3.1)
LYMPHOCYTES NFR BLD AUTO: 3.5 % (ref 19.6–45.3)
LYMPHOCYTES NFR BLD AUTO: 5.2 % (ref 19.6–45.3)
M PNEUMO IGG SER IA-ACNC: NOT DETECTED
MAGNESIUM SERPL-MCNC: 1.6 MG/DL (ref 1.6–2.4)
MAXIMAL PREDICTED HEART RATE: 132 BPM
MCH RBC QN AUTO: 30.2 PG (ref 26.6–33)
MCH RBC QN AUTO: 30.6 PG (ref 26.6–33)
MCHC RBC AUTO-ENTMCNC: 32.6 G/DL (ref 31.5–35.7)
MCHC RBC AUTO-ENTMCNC: 33.1 G/DL (ref 31.5–35.7)
MCV RBC AUTO: 91.3 FL (ref 79–97)
MCV RBC AUTO: 93.8 FL (ref 79–97)
MONOCYTES # BLD AUTO: 0.47 10*3/MM3 (ref 0.1–0.9)
MONOCYTES # BLD AUTO: 0.83 10*3/MM3 (ref 0.1–0.9)
MONOCYTES NFR BLD AUTO: 4.9 % (ref 5–12)
MONOCYTES NFR BLD AUTO: 5.7 % (ref 5–12)
NEUTROPHILS # BLD AUTO: 12.86 10*3/MM3 (ref 1.7–7)
NEUTROPHILS # BLD AUTO: 8.73 10*3/MM3 (ref 1.7–7)
NEUTROPHILS NFR BLD AUTO: 87.6 % (ref 42.7–76)
NEUTROPHILS NFR BLD AUTO: 91.3 % (ref 42.7–76)
NITRITE UR QL STRIP: NEGATIVE
NOROVIRUS GI+II RNA STL QL NAA+NON-PROBE: DETECTED
NRBC BLD AUTO-RTO: 0 /100 WBC (ref 0–0.2)
P SHIGELLOIDES DNA STL QL NAA+PROBE: NOT DETECTED
PH UR STRIP.AUTO: 5.5 [PH] (ref 4.5–8)
PLATELET # BLD AUTO: 272 10*3/MM3 (ref 140–450)
PLATELET # BLD AUTO: 327 10*3/MM3 (ref 140–450)
PMV BLD AUTO: 8.6 FL (ref 6–12)
PMV BLD AUTO: 8.6 FL (ref 6–12)
POTASSIUM BLD-SCNC: 4 MMOL/L (ref 3.5–5.2)
POTASSIUM BLD-SCNC: 4.5 MMOL/L (ref 3.5–5.2)
PROCALCITONIN SERPL-MCNC: 0.4 NG/ML (ref 0.1–0.25)
PROT SERPL-MCNC: 8.9 G/DL (ref 6–8.5)
PROT UR QL STRIP: ABNORMAL
RBC # BLD AUTO: 4.35 10*6/MM3 (ref 3.77–5.28)
RBC # BLD AUTO: 4.73 10*6/MM3 (ref 3.77–5.28)
RBC # UR: ABNORMAL /HPF
REF LAB TEST METHOD: ABNORMAL
RHINOVIRUS RNA SPEC NAA+PROBE: NOT DETECTED
RSV RNA NPH QL NAA+NON-PROBE: NOT DETECTED
RV RNA STL NAA+PROBE: NOT DETECTED
SALMONELLA DNA SPEC QL NAA+PROBE: NOT DETECTED
SAPO I+II+IV+V RNA STL QL NAA+NON-PROBE: NOT DETECTED
SHIGELLA SP+EIEC IPAH STL QL NAA+PROBE: NOT DETECTED
SODIUM BLD-SCNC: 131 MMOL/L (ref 136–145)
SODIUM BLD-SCNC: 131 MMOL/L (ref 136–145)
SP GR UR STRIP: 1.02 (ref 1–1.03)
SQUAMOUS #/AREA URNS HPF: ABNORMAL /HPF
STRESS TARGET HR: 112 BPM
TSH SERPL DL<=0.05 MIU/L-ACNC: 0.5 UIU/ML (ref 0.27–4.2)
UROBILINOGEN UR QL STRIP: ABNORMAL
V CHOLERAE DNA SPEC QL NAA+PROBE: NOT DETECTED
VIBRIO DNA SPEC NAA+PROBE: NOT DETECTED
WBC NRBC COR # BLD: 14.69 10*3/MM3 (ref 3.4–10.8)
WBC NRBC COR # BLD: 9.56 10*3/MM3 (ref 3.4–10.8)
WBC UR QL AUTO: ABNORMAL /HPF
YERSINIA STL CULT: NOT DETECTED

## 2020-01-24 PROCEDURE — 25010000002 CEFEPIME 2 G/NS 100 ML SOLUTION 1 EACH BAG: Performed by: INTERNAL MEDICINE

## 2020-01-24 PROCEDURE — 25010000002 ONDANSETRON PER 1 MG: Performed by: INTERNAL MEDICINE

## 2020-01-24 PROCEDURE — 84443 ASSAY THYROID STIM HORMONE: CPT | Performed by: INTERNAL MEDICINE

## 2020-01-24 PROCEDURE — 74177 CT ABD & PELVIS W/CONTRAST: CPT

## 2020-01-24 PROCEDURE — 84145 PROCALCITONIN (PCT): CPT | Performed by: INTERNAL MEDICINE

## 2020-01-24 PROCEDURE — 99223 1ST HOSP IP/OBS HIGH 75: CPT | Performed by: INTERNAL MEDICINE

## 2020-01-24 PROCEDURE — 85025 COMPLETE CBC W/AUTO DIFF WBC: CPT | Performed by: EMERGENCY MEDICINE

## 2020-01-24 PROCEDURE — 25010000002 CEFEPIME PER 500 MG: Performed by: INTERNAL MEDICINE

## 2020-01-24 PROCEDURE — 0100U HC BIOFIRE FILMARRAY RESP PANEL 2: CPT | Performed by: NURSE PRACTITIONER

## 2020-01-24 PROCEDURE — 99222 1ST HOSP IP/OBS MODERATE 55: CPT | Performed by: INTERNAL MEDICINE

## 2020-01-24 PROCEDURE — 87804 INFLUENZA ASSAY W/OPTIC: CPT | Performed by: EMERGENCY MEDICINE

## 2020-01-24 PROCEDURE — 87186 SC STD MICRODIL/AGAR DIL: CPT | Performed by: EMERGENCY MEDICINE

## 2020-01-24 PROCEDURE — 94640 AIRWAY INHALATION TREATMENT: CPT

## 2020-01-24 PROCEDURE — 99284 EMERGENCY DEPT VISIT MOD MDM: CPT

## 2020-01-24 PROCEDURE — 87077 CULTURE AEROBIC IDENTIFY: CPT | Performed by: EMERGENCY MEDICINE

## 2020-01-24 PROCEDURE — 25010000002 ONDANSETRON PER 1 MG: Performed by: EMERGENCY MEDICINE

## 2020-01-24 PROCEDURE — 87449 NOS EACH ORGANISM AG IA: CPT | Performed by: INTERNAL MEDICINE

## 2020-01-24 PROCEDURE — 71046 X-RAY EXAM CHEST 2 VIEWS: CPT

## 2020-01-24 PROCEDURE — 99284 EMERGENCY DEPT VISIT MOD MDM: CPT | Performed by: EMERGENCY MEDICINE

## 2020-01-24 PROCEDURE — 85025 COMPLETE CBC W/AUTO DIFF WBC: CPT | Performed by: INTERNAL MEDICINE

## 2020-01-24 PROCEDURE — 94799 UNLISTED PULMONARY SVC/PX: CPT

## 2020-01-24 PROCEDURE — 80053 COMPREHEN METABOLIC PANEL: CPT | Performed by: EMERGENCY MEDICINE

## 2020-01-24 PROCEDURE — 87324 CLOSTRIDIUM AG IA: CPT | Performed by: INTERNAL MEDICINE

## 2020-01-24 PROCEDURE — 87086 URINE CULTURE/COLONY COUNT: CPT | Performed by: EMERGENCY MEDICINE

## 2020-01-24 PROCEDURE — 81001 URINALYSIS AUTO W/SCOPE: CPT | Performed by: EMERGENCY MEDICINE

## 2020-01-24 PROCEDURE — 83735 ASSAY OF MAGNESIUM: CPT | Performed by: INTERNAL MEDICINE

## 2020-01-24 PROCEDURE — 93306 TTE W/DOPPLER COMPLETE: CPT | Performed by: INTERNAL MEDICINE

## 2020-01-24 PROCEDURE — 83605 ASSAY OF LACTIC ACID: CPT | Performed by: EMERGENCY MEDICINE

## 2020-01-24 PROCEDURE — P9612 CATHETERIZE FOR URINE SPEC: HCPCS

## 2020-01-24 PROCEDURE — 87040 BLOOD CULTURE FOR BACTERIA: CPT | Performed by: EMERGENCY MEDICINE

## 2020-01-24 PROCEDURE — 25010000002 CEFTRIAXONE SODIUM-DEXTROSE 2-2.22 GM-%(50ML) RECONSTITUTED SOLUTION: Performed by: EMERGENCY MEDICINE

## 2020-01-24 PROCEDURE — 93306 TTE W/DOPPLER COMPLETE: CPT

## 2020-01-24 PROCEDURE — 0 IOPAMIDOL PER 1 ML: Performed by: EMERGENCY MEDICINE

## 2020-01-24 PROCEDURE — 25010000002 PERFLUTREN (DEFINITY) 8.476 MG IN SODIUM CHLORIDE 0.9 % 10 ML INJECTION: Performed by: INTERNAL MEDICINE

## 2020-01-24 PROCEDURE — 83605 ASSAY OF LACTIC ACID: CPT | Performed by: INTERNAL MEDICINE

## 2020-01-24 PROCEDURE — 0097U HC BIOFIRE FILMARRAY GI PANEL: CPT | Performed by: INTERNAL MEDICINE

## 2020-01-24 RX ORDER — ARFORMOTEROL TARTRATE 15 UG/2ML
15 SOLUTION RESPIRATORY (INHALATION)
Status: DISCONTINUED | OUTPATIENT
Start: 2020-01-24 | End: 2020-01-27 | Stop reason: HOSPADM

## 2020-01-24 RX ORDER — ACETAMINOPHEN 325 MG/1
650 TABLET ORAL EVERY 4 HOURS PRN
Status: DISCONTINUED | OUTPATIENT
Start: 2020-01-24 | End: 2020-01-27 | Stop reason: HOSPADM

## 2020-01-24 RX ORDER — ONDANSETRON 4 MG/1
4 TABLET, FILM COATED ORAL EVERY 6 HOURS PRN
Status: DISCONTINUED | OUTPATIENT
Start: 2020-01-24 | End: 2020-01-27 | Stop reason: HOSPADM

## 2020-01-24 RX ORDER — NIFEDIPINE 30 MG/1
30 TABLET, EXTENDED RELEASE ORAL DAILY
Status: DISCONTINUED | OUTPATIENT
Start: 2020-01-24 | End: 2020-01-27 | Stop reason: HOSPADM

## 2020-01-24 RX ORDER — SODIUM CHLORIDE, SODIUM LACTATE, POTASSIUM CHLORIDE, CALCIUM CHLORIDE 600; 310; 30; 20 MG/100ML; MG/100ML; MG/100ML; MG/100ML
50 INJECTION, SOLUTION INTRAVENOUS CONTINUOUS
Status: DISCONTINUED | OUTPATIENT
Start: 2020-01-24 | End: 2020-01-25

## 2020-01-24 RX ORDER — SODIUM CHLORIDE 9 MG/ML
75 INJECTION, SOLUTION INTRAVENOUS CONTINUOUS
Status: DISCONTINUED | OUTPATIENT
Start: 2020-01-24 | End: 2020-01-24

## 2020-01-24 RX ORDER — CHOLECALCIFEROL (VITAMIN D3) 125 MCG
5 CAPSULE ORAL NIGHTLY PRN
Status: DISCONTINUED | OUTPATIENT
Start: 2020-01-24 | End: 2020-01-27 | Stop reason: HOSPADM

## 2020-01-24 RX ORDER — LEVOTHYROXINE SODIUM 0.05 MG/1
50 TABLET ORAL DAILY
Status: DISCONTINUED | OUTPATIENT
Start: 2020-01-24 | End: 2020-01-27 | Stop reason: HOSPADM

## 2020-01-24 RX ORDER — BUDESONIDE 1 MG/2ML
1 INHALANT ORAL
COMMUNITY

## 2020-01-24 RX ORDER — CEFTRIAXONE 1 G/50ML
1 INJECTION, SOLUTION INTRAVENOUS EVERY 24 HOURS
Status: DISCONTINUED | OUTPATIENT
Start: 2020-01-25 | End: 2020-01-24

## 2020-01-24 RX ORDER — TRAMADOL HYDROCHLORIDE 50 MG/1
50 TABLET ORAL 3 TIMES DAILY
Status: DISCONTINUED | OUTPATIENT
Start: 2020-01-24 | End: 2020-01-27 | Stop reason: HOSPADM

## 2020-01-24 RX ORDER — ACETAMINOPHEN 160 MG/5ML
650 SOLUTION ORAL EVERY 4 HOURS PRN
Status: DISCONTINUED | OUTPATIENT
Start: 2020-01-24 | End: 2020-01-27 | Stop reason: HOSPADM

## 2020-01-24 RX ORDER — PHENAZOPYRIDINE HYDROCHLORIDE 100 MG/1
TABLET, FILM COATED ORAL
Status: COMPLETED
Start: 2020-01-24 | End: 2020-01-24

## 2020-01-24 RX ORDER — IPRATROPIUM BROMIDE AND ALBUTEROL SULFATE 2.5; .5 MG/3ML; MG/3ML
3 SOLUTION RESPIRATORY (INHALATION)
Status: DISCONTINUED | OUTPATIENT
Start: 2020-01-24 | End: 2020-01-25

## 2020-01-24 RX ORDER — FAMOTIDINE 20 MG/1
40 TABLET, FILM COATED ORAL DAILY
Status: DISCONTINUED | OUTPATIENT
Start: 2020-01-24 | End: 2020-01-27 | Stop reason: HOSPADM

## 2020-01-24 RX ORDER — CEFTRIAXONE 2 G/50ML
2 INJECTION, SOLUTION INTRAVENOUS ONCE
Status: COMPLETED | OUTPATIENT
Start: 2020-01-24 | End: 2020-01-24

## 2020-01-24 RX ORDER — CITALOPRAM 20 MG/1
20 TABLET ORAL DAILY
Status: DISCONTINUED | OUTPATIENT
Start: 2020-01-24 | End: 2020-01-27 | Stop reason: HOSPADM

## 2020-01-24 RX ORDER — ONDANSETRON 2 MG/ML
4 INJECTION INTRAMUSCULAR; INTRAVENOUS EVERY 6 HOURS PRN
Status: DISCONTINUED | OUTPATIENT
Start: 2020-01-24 | End: 2020-01-27 | Stop reason: HOSPADM

## 2020-01-24 RX ORDER — FORMOTEROL FUMARATE 20 UG/2ML
SOLUTION RESPIRATORY (INHALATION)
COMMUNITY

## 2020-01-24 RX ORDER — PHENAZOPYRIDINE HYDROCHLORIDE 100 MG/1
100 TABLET, FILM COATED ORAL
Status: COMPLETED | OUTPATIENT
Start: 2020-01-24 | End: 2020-01-25

## 2020-01-24 RX ORDER — BISACODYL 5 MG/1
5 TABLET, DELAYED RELEASE ORAL DAILY PRN
Status: DISCONTINUED | OUTPATIENT
Start: 2020-01-24 | End: 2020-01-27 | Stop reason: HOSPADM

## 2020-01-24 RX ORDER — SODIUM CHLORIDE 0.9 % (FLUSH) 0.9 %
10 SYRINGE (ML) INJECTION EVERY 12 HOURS SCHEDULED
Status: DISCONTINUED | OUTPATIENT
Start: 2020-01-24 | End: 2020-01-27 | Stop reason: HOSPADM

## 2020-01-24 RX ORDER — CETIRIZINE HYDROCHLORIDE 10 MG/1
5 TABLET ORAL DAILY
Status: DISCONTINUED | OUTPATIENT
Start: 2020-01-24 | End: 2020-01-27 | Stop reason: HOSPADM

## 2020-01-24 RX ORDER — SODIUM CHLORIDE 0.9 % (FLUSH) 0.9 %
10 SYRINGE (ML) INJECTION AS NEEDED
Status: DISCONTINUED | OUTPATIENT
Start: 2020-01-24 | End: 2020-01-27 | Stop reason: HOSPADM

## 2020-01-24 RX ORDER — BUDESONIDE AND FORMOTEROL FUMARATE DIHYDRATE 160; 4.5 UG/1; UG/1
2 AEROSOL RESPIRATORY (INHALATION)
Status: DISCONTINUED | OUTPATIENT
Start: 2020-01-24 | End: 2020-01-24

## 2020-01-24 RX ORDER — BUDESONIDE 0.5 MG/2ML
0.5 INHALANT ORAL
Status: DISCONTINUED | OUTPATIENT
Start: 2020-01-24 | End: 2020-01-27 | Stop reason: HOSPADM

## 2020-01-24 RX ORDER — ACETAMINOPHEN 650 MG/1
650 SUPPOSITORY RECTAL EVERY 4 HOURS PRN
Status: DISCONTINUED | OUTPATIENT
Start: 2020-01-24 | End: 2020-01-27 | Stop reason: HOSPADM

## 2020-01-24 RX ORDER — ONDANSETRON 2 MG/ML
4 INJECTION INTRAMUSCULAR; INTRAVENOUS ONCE
Status: COMPLETED | OUTPATIENT
Start: 2020-01-24 | End: 2020-01-24

## 2020-01-24 RX ORDER — BISACODYL 10 MG
10 SUPPOSITORY, RECTAL RECTAL DAILY PRN
Status: DISCONTINUED | OUTPATIENT
Start: 2020-01-24 | End: 2020-01-27 | Stop reason: HOSPADM

## 2020-01-24 RX ORDER — SODIUM CHLORIDE 9 MG/ML
40 INJECTION, SOLUTION INTRAVENOUS AS NEEDED
Status: DISCONTINUED | OUTPATIENT
Start: 2020-01-24 | End: 2020-01-27 | Stop reason: HOSPADM

## 2020-01-24 RX ADMIN — IPRATROPIUM BROMIDE AND ALBUTEROL SULFATE 3 ML: .5; 3 SOLUTION RESPIRATORY (INHALATION) at 19:31

## 2020-01-24 RX ADMIN — CEFEPIME 2 G: 2 INJECTION, POWDER, FOR SOLUTION INTRAVENOUS at 23:13

## 2020-01-24 RX ADMIN — PHENAZOPYRIDINE HYDROCHLORIDE 100 MG: 100 TABLET ORAL at 21:08

## 2020-01-24 RX ADMIN — LEVOTHYROXINE SODIUM 50 MCG: 50 TABLET ORAL at 09:35

## 2020-01-24 RX ADMIN — TRAMADOL HYDROCHLORIDE 50 MG: 50 TABLET, FILM COATED ORAL at 15:42

## 2020-01-24 RX ADMIN — BUDESONIDE AND FORMOTEROL FUMARATE DIHYDRATE 2 PUFF: 160; 4.5 AEROSOL RESPIRATORY (INHALATION) at 11:01

## 2020-01-24 RX ADMIN — CEFEPIME 2 G: 2 INJECTION, POWDER, FOR SOLUTION INTRAVENOUS at 11:13

## 2020-01-24 RX ADMIN — TRAMADOL HYDROCHLORIDE 50 MG: 50 TABLET, FILM COATED ORAL at 09:35

## 2020-01-24 RX ADMIN — IPRATROPIUM BROMIDE AND ALBUTEROL SULFATE 3 ML: .5; 3 SOLUTION RESPIRATORY (INHALATION) at 15:15

## 2020-01-24 RX ADMIN — IPRATROPIUM BROMIDE AND ALBUTEROL SULFATE 3 ML: .5; 3 SOLUTION RESPIRATORY (INHALATION) at 12:00

## 2020-01-24 RX ADMIN — SODIUM CHLORIDE, POTASSIUM CHLORIDE, SODIUM LACTATE AND CALCIUM CHLORIDE 50 ML/HR: 600; 310; 30; 20 INJECTION, SOLUTION INTRAVENOUS at 19:27

## 2020-01-24 RX ADMIN — SODIUM CHLORIDE, PRESERVATIVE FREE 10 ML: 5 INJECTION INTRAVENOUS at 23:14

## 2020-01-24 RX ADMIN — CETIRIZINE HYDROCHLORIDE 5 MG: 10 TABLET, FILM COATED ORAL at 09:35

## 2020-01-24 RX ADMIN — FAMOTIDINE 40 MG: 20 TABLET ORAL at 09:34

## 2020-01-24 RX ADMIN — ONDANSETRON 4 MG: 2 INJECTION, SOLUTION INTRAMUSCULAR; INTRAVENOUS at 02:12

## 2020-01-24 RX ADMIN — ONDANSETRON 4 MG: 2 INJECTION, SOLUTION INTRAMUSCULAR; INTRAVENOUS at 09:35

## 2020-01-24 RX ADMIN — TRAMADOL HYDROCHLORIDE 50 MG: 50 TABLET, FILM COATED ORAL at 21:06

## 2020-01-24 RX ADMIN — SODIUM CHLORIDE, PRESERVATIVE FREE 10 ML: 5 INJECTION INTRAVENOUS at 09:35

## 2020-01-24 RX ADMIN — BUDESONIDE 0.5 MG: 0.5 SUSPENSION RESPIRATORY (INHALATION) at 19:32

## 2020-01-24 RX ADMIN — IOPAMIDOL 100 ML: 755 INJECTION, SOLUTION INTRAVENOUS at 03:14

## 2020-01-24 RX ADMIN — PERFLUTREN 1.5 ML: 6.52 INJECTION, SUSPENSION INTRAVENOUS at 16:15

## 2020-01-24 RX ADMIN — CEFTRIAXONE 2 G: 2 INJECTION, SOLUTION INTRAVENOUS at 02:50

## 2020-01-24 RX ADMIN — NIFEDIPINE 30 MG: 30 TABLET, FILM COATED, EXTENDED RELEASE ORAL at 09:35

## 2020-01-24 RX ADMIN — ARFORMOTEROL TARTRATE 15 MCG: 15 SOLUTION RESPIRATORY (INHALATION) at 19:39

## 2020-01-24 RX ADMIN — CITALOPRAM HYDROBROMIDE 20 MG: 20 TABLET ORAL at 09:35

## 2020-01-25 PROBLEM — A08.11 ENTERITIS DUE TO NOROVIRUS: Status: ACTIVE | Noted: 2020-01-25

## 2020-01-25 LAB
ANION GAP SERPL CALCULATED.3IONS-SCNC: 10.6 MMOL/L (ref 5–15)
BASOPHILS # BLD AUTO: 0.02 10*3/MM3 (ref 0–0.2)
BASOPHILS NFR BLD AUTO: 0.3 % (ref 0–1.5)
BUN BLD-MCNC: 16 MG/DL (ref 8–23)
BUN/CREAT SERPL: 19.8 (ref 7–25)
CALCIUM SPEC-SCNC: 8.1 MG/DL (ref 8.6–10.5)
CHLORIDE SERPL-SCNC: 100 MMOL/L (ref 98–107)
CO2 SERPL-SCNC: 22.4 MMOL/L (ref 22–29)
CREAT BLD-MCNC: 0.81 MG/DL (ref 0.57–1)
DEPRECATED RDW RBC AUTO: 47.4 FL (ref 37–54)
EOSINOPHIL # BLD AUTO: 0.1 10*3/MM3 (ref 0–0.4)
EOSINOPHIL NFR BLD AUTO: 1.6 % (ref 0.3–6.2)
ERYTHROCYTE [DISTWIDTH] IN BLOOD BY AUTOMATED COUNT: 13.2 % (ref 12.3–15.4)
GFR SERPL CREATININE-BSD FRML MDRD: 67 ML/MIN/1.73
GLUCOSE BLD-MCNC: 107 MG/DL (ref 65–99)
HCT VFR BLD AUTO: 38.6 % (ref 34–46.6)
HGB BLD-MCNC: 11.9 G/DL (ref 12–15.9)
IMM GRANULOCYTES # BLD AUTO: 0.02 10*3/MM3 (ref 0–0.05)
IMM GRANULOCYTES NFR BLD AUTO: 0.3 % (ref 0–0.5)
LYMPHOCYTES # BLD AUTO: 0.9 10*3/MM3 (ref 0.7–3.1)
LYMPHOCYTES NFR BLD AUTO: 14 % (ref 19.6–45.3)
MAGNESIUM SERPL-MCNC: 1.7 MG/DL (ref 1.6–2.4)
MCH RBC QN AUTO: 30.2 PG (ref 26.6–33)
MCHC RBC AUTO-ENTMCNC: 30.8 G/DL (ref 31.5–35.7)
MCV RBC AUTO: 98 FL (ref 79–97)
MONOCYTES # BLD AUTO: 0.66 10*3/MM3 (ref 0.1–0.9)
MONOCYTES NFR BLD AUTO: 10.3 % (ref 5–12)
NEUTROPHILS # BLD AUTO: 4.71 10*3/MM3 (ref 1.7–7)
NEUTROPHILS NFR BLD AUTO: 73.5 % (ref 42.7–76)
NRBC BLD AUTO-RTO: 0 /100 WBC (ref 0–0.2)
PHOSPHATE SERPL-MCNC: 3 MG/DL (ref 2.5–4.5)
PLATELET # BLD AUTO: 214 10*3/MM3 (ref 140–450)
PMV BLD AUTO: 8.8 FL (ref 6–12)
POTASSIUM BLD-SCNC: 3.2 MMOL/L (ref 3.5–5.2)
PROCALCITONIN SERPL-MCNC: 0.16 NG/ML (ref 0.1–0.25)
RBC # BLD AUTO: 3.94 10*6/MM3 (ref 3.77–5.28)
SODIUM BLD-SCNC: 133 MMOL/L (ref 136–145)
WBC NRBC COR # BLD: 6.41 10*3/MM3 (ref 3.4–10.8)

## 2020-01-25 PROCEDURE — 25010000002 CEFEPIME PER 500 MG: Performed by: INTERNAL MEDICINE

## 2020-01-25 PROCEDURE — 85025 COMPLETE CBC W/AUTO DIFF WBC: CPT | Performed by: INTERNAL MEDICINE

## 2020-01-25 PROCEDURE — 83735 ASSAY OF MAGNESIUM: CPT | Performed by: INTERNAL MEDICINE

## 2020-01-25 PROCEDURE — 80048 BASIC METABOLIC PNL TOTAL CA: CPT | Performed by: INTERNAL MEDICINE

## 2020-01-25 PROCEDURE — 84100 ASSAY OF PHOSPHORUS: CPT | Performed by: INTERNAL MEDICINE

## 2020-01-25 PROCEDURE — 99232 SBSQ HOSP IP/OBS MODERATE 35: CPT | Performed by: INTERNAL MEDICINE

## 2020-01-25 PROCEDURE — 94799 UNLISTED PULMONARY SVC/PX: CPT

## 2020-01-25 PROCEDURE — 84145 PROCALCITONIN (PCT): CPT | Performed by: INTERNAL MEDICINE

## 2020-01-25 RX ORDER — IPRATROPIUM BROMIDE AND ALBUTEROL SULFATE 2.5; .5 MG/3ML; MG/3ML
3 SOLUTION RESPIRATORY (INHALATION) EVERY 4 HOURS PRN
Status: DISCONTINUED | OUTPATIENT
Start: 2020-01-25 | End: 2020-01-27 | Stop reason: HOSPADM

## 2020-01-25 RX ORDER — L.ACID,PARA/B.BIFIDUM/S.THERM 8B CELL
1 CAPSULE ORAL DAILY
Status: DISCONTINUED | OUTPATIENT
Start: 2020-01-25 | End: 2020-01-27 | Stop reason: HOSPADM

## 2020-01-25 RX ORDER — PHENAZOPYRIDINE HYDROCHLORIDE 100 MG/1
100 TABLET, FILM COATED ORAL
Status: COMPLETED | OUTPATIENT
Start: 2020-01-25 | End: 2020-01-26

## 2020-01-25 RX ADMIN — CITALOPRAM HYDROBROMIDE 20 MG: 20 TABLET ORAL at 08:15

## 2020-01-25 RX ADMIN — SODIUM CHLORIDE, PRESERVATIVE FREE 10 ML: 5 INJECTION INTRAVENOUS at 08:17

## 2020-01-25 RX ADMIN — BUDESONIDE 0.5 MG: 0.5 SUSPENSION RESPIRATORY (INHALATION) at 08:06

## 2020-01-25 RX ADMIN — TRAMADOL HYDROCHLORIDE 50 MG: 50 TABLET, FILM COATED ORAL at 21:33

## 2020-01-25 RX ADMIN — PHENAZOPYRIDINE HYDROCHLORIDE 100 MG: 100 TABLET ORAL at 08:16

## 2020-01-25 RX ADMIN — CETIRIZINE HYDROCHLORIDE 5 MG: 10 TABLET, FILM COATED ORAL at 08:15

## 2020-01-25 RX ADMIN — LEVOTHYROXINE SODIUM 50 MCG: 50 TABLET ORAL at 08:16

## 2020-01-25 RX ADMIN — ARFORMOTEROL TARTRATE 15 MCG: 15 SOLUTION RESPIRATORY (INHALATION) at 19:14

## 2020-01-25 RX ADMIN — ARFORMOTEROL TARTRATE 15 MCG: 15 SOLUTION RESPIRATORY (INHALATION) at 08:14

## 2020-01-25 RX ADMIN — TRAMADOL HYDROCHLORIDE 50 MG: 50 TABLET, FILM COATED ORAL at 16:07

## 2020-01-25 RX ADMIN — CEFEPIME 2 G: 2 INJECTION, POWDER, FOR SOLUTION INTRAVENOUS at 12:13

## 2020-01-25 RX ADMIN — TRAMADOL HYDROCHLORIDE 50 MG: 50 TABLET, FILM COATED ORAL at 08:15

## 2020-01-25 RX ADMIN — NIFEDIPINE 30 MG: 30 TABLET, FILM COATED, EXTENDED RELEASE ORAL at 08:27

## 2020-01-25 RX ADMIN — BUDESONIDE 0.5 MG: 0.5 SUSPENSION RESPIRATORY (INHALATION) at 19:13

## 2020-01-25 RX ADMIN — FAMOTIDINE 40 MG: 20 TABLET ORAL at 08:16

## 2020-01-25 RX ADMIN — Medication 1 CAPSULE: at 21:33

## 2020-01-26 LAB
ANION GAP SERPL CALCULATED.3IONS-SCNC: 8.3 MMOL/L (ref 5–15)
BACTERIA SPEC AEROBE CULT: ABNORMAL
BASOPHILS # BLD AUTO: 0.03 10*3/MM3 (ref 0–0.2)
BASOPHILS NFR BLD AUTO: 0.4 % (ref 0–1.5)
BUN BLD-MCNC: 9 MG/DL (ref 8–23)
BUN/CREAT SERPL: 16.1 (ref 7–25)
CALCIUM SPEC-SCNC: 8.2 MG/DL (ref 8.6–10.5)
CHLORIDE SERPL-SCNC: 100 MMOL/L (ref 98–107)
CO2 SERPL-SCNC: 25.7 MMOL/L (ref 22–29)
CREAT BLD-MCNC: 0.56 MG/DL (ref 0.57–1)
DEPRECATED RDW RBC AUTO: 46.1 FL (ref 37–54)
EOSINOPHIL # BLD AUTO: 0.44 10*3/MM3 (ref 0–0.4)
EOSINOPHIL NFR BLD AUTO: 6.1 % (ref 0.3–6.2)
ERYTHROCYTE [DISTWIDTH] IN BLOOD BY AUTOMATED COUNT: 13.1 % (ref 12.3–15.4)
GFR SERPL CREATININE-BSD FRML MDRD: 102 ML/MIN/1.73
GLUCOSE BLD-MCNC: 90 MG/DL (ref 65–99)
HCT VFR BLD AUTO: 34.8 % (ref 34–46.6)
HGB BLD-MCNC: 11.2 G/DL (ref 12–15.9)
IMM GRANULOCYTES # BLD AUTO: 0.02 10*3/MM3 (ref 0–0.05)
IMM GRANULOCYTES NFR BLD AUTO: 0.3 % (ref 0–0.5)
LYMPHOCYTES # BLD AUTO: 1.44 10*3/MM3 (ref 0.7–3.1)
LYMPHOCYTES NFR BLD AUTO: 19.9 % (ref 19.6–45.3)
MCH RBC QN AUTO: 30.5 PG (ref 26.6–33)
MCHC RBC AUTO-ENTMCNC: 32.2 G/DL (ref 31.5–35.7)
MCV RBC AUTO: 94.8 FL (ref 79–97)
MONOCYTES # BLD AUTO: 0.92 10*3/MM3 (ref 0.1–0.9)
MONOCYTES NFR BLD AUTO: 12.7 % (ref 5–12)
NEUTROPHILS # BLD AUTO: 4.39 10*3/MM3 (ref 1.7–7)
NEUTROPHILS NFR BLD AUTO: 60.6 % (ref 42.7–76)
NRBC BLD AUTO-RTO: 0 /100 WBC (ref 0–0.2)
PLATELET # BLD AUTO: 206 10*3/MM3 (ref 140–450)
PMV BLD AUTO: 8.8 FL (ref 6–12)
POTASSIUM BLD-SCNC: 3.4 MMOL/L (ref 3.5–5.2)
RBC # BLD AUTO: 3.67 10*6/MM3 (ref 3.77–5.28)
SODIUM BLD-SCNC: 134 MMOL/L (ref 136–145)
WBC NRBC COR # BLD: 7.24 10*3/MM3 (ref 3.4–10.8)

## 2020-01-26 PROCEDURE — 94799 UNLISTED PULMONARY SVC/PX: CPT

## 2020-01-26 PROCEDURE — 85025 COMPLETE CBC W/AUTO DIFF WBC: CPT | Performed by: INTERNAL MEDICINE

## 2020-01-26 PROCEDURE — 80048 BASIC METABOLIC PNL TOTAL CA: CPT | Performed by: INTERNAL MEDICINE

## 2020-01-26 PROCEDURE — 25010000002 CEFEPIME PER 500 MG: Performed by: INTERNAL MEDICINE

## 2020-01-26 PROCEDURE — 99232 SBSQ HOSP IP/OBS MODERATE 35: CPT | Performed by: INTERNAL MEDICINE

## 2020-01-26 RX ORDER — POTASSIUM CHLORIDE 20 MEQ/1
40 TABLET, EXTENDED RELEASE ORAL 2 TIMES DAILY WITH MEALS
Status: COMPLETED | OUTPATIENT
Start: 2020-01-26 | End: 2020-01-26

## 2020-01-26 RX ADMIN — CITALOPRAM HYDROBROMIDE 20 MG: 20 TABLET ORAL at 08:53

## 2020-01-26 RX ADMIN — POTASSIUM CHLORIDE 40 MEQ: 1500 TABLET, EXTENDED RELEASE ORAL at 18:50

## 2020-01-26 RX ADMIN — PHENAZOPYRIDINE HYDROCHLORIDE 100 MG: 100 TABLET ORAL at 08:53

## 2020-01-26 RX ADMIN — PHENAZOPYRIDINE HYDROCHLORIDE 100 MG: 100 TABLET ORAL at 00:23

## 2020-01-26 RX ADMIN — CETIRIZINE HYDROCHLORIDE 5 MG: 10 TABLET, FILM COATED ORAL at 08:53

## 2020-01-26 RX ADMIN — CEFEPIME 2 G: 2 INJECTION, POWDER, FOR SOLUTION INTRAVENOUS at 02:50

## 2020-01-26 RX ADMIN — PHENAZOPYRIDINE HYDROCHLORIDE 100 MG: 100 TABLET ORAL at 12:43

## 2020-01-26 RX ADMIN — BUDESONIDE 0.5 MG: 0.5 SUSPENSION RESPIRATORY (INHALATION) at 19:59

## 2020-01-26 RX ADMIN — NIFEDIPINE 30 MG: 30 TABLET, FILM COATED, EXTENDED RELEASE ORAL at 08:53

## 2020-01-26 RX ADMIN — SODIUM CHLORIDE, PRESERVATIVE FREE 10 ML: 5 INJECTION INTRAVENOUS at 20:30

## 2020-01-26 RX ADMIN — TRAMADOL HYDROCHLORIDE 50 MG: 50 TABLET, FILM COATED ORAL at 08:53

## 2020-01-26 RX ADMIN — SODIUM CHLORIDE, PRESERVATIVE FREE 10 ML: 5 INJECTION INTRAVENOUS at 02:54

## 2020-01-26 RX ADMIN — LEVOTHYROXINE SODIUM 50 MCG: 50 TABLET ORAL at 08:53

## 2020-01-26 RX ADMIN — POTASSIUM CHLORIDE 40 MEQ: 1500 TABLET, EXTENDED RELEASE ORAL at 10:28

## 2020-01-26 RX ADMIN — CEFEPIME 2 G: 2 INJECTION, POWDER, FOR SOLUTION INTRAVENOUS at 22:18

## 2020-01-26 RX ADMIN — TRAMADOL HYDROCHLORIDE 50 MG: 50 TABLET, FILM COATED ORAL at 16:00

## 2020-01-26 RX ADMIN — CEFEPIME 2 G: 2 INJECTION, POWDER, FOR SOLUTION INTRAVENOUS at 10:29

## 2020-01-26 RX ADMIN — ARFORMOTEROL TARTRATE 15 MCG: 15 SOLUTION RESPIRATORY (INHALATION) at 19:59

## 2020-01-26 RX ADMIN — FAMOTIDINE 40 MG: 20 TABLET ORAL at 08:53

## 2020-01-26 RX ADMIN — Medication 1 CAPSULE: at 08:53

## 2020-01-26 RX ADMIN — ARFORMOTEROL TARTRATE 15 MCG: 15 SOLUTION RESPIRATORY (INHALATION) at 09:29

## 2020-01-26 RX ADMIN — BUDESONIDE 0.5 MG: 0.5 SUSPENSION RESPIRATORY (INHALATION) at 09:25

## 2020-01-26 RX ADMIN — SODIUM CHLORIDE, PRESERVATIVE FREE 10 ML: 5 INJECTION INTRAVENOUS at 10:33

## 2020-01-27 ENCOUNTER — APPOINTMENT (OUTPATIENT)
Dept: GENERAL RADIOLOGY | Facility: HOSPITAL | Age: 85
End: 2020-01-27

## 2020-01-27 VITALS
DIASTOLIC BLOOD PRESSURE: 58 MMHG | SYSTOLIC BLOOD PRESSURE: 116 MMHG | HEIGHT: 68 IN | OXYGEN SATURATION: 93 % | HEART RATE: 70 BPM | TEMPERATURE: 99.5 F | RESPIRATION RATE: 20 BRPM | BODY MASS INDEX: 29.1 KG/M2 | WEIGHT: 192 LBS

## 2020-01-27 LAB
ANION GAP SERPL CALCULATED.3IONS-SCNC: 6.4 MMOL/L (ref 5–15)
BASOPHILS # BLD AUTO: 0.03 10*3/MM3 (ref 0–0.2)
BASOPHILS NFR BLD AUTO: 0.5 % (ref 0–1.5)
BUN BLD-MCNC: 5 MG/DL (ref 8–23)
BUN/CREAT SERPL: 8.8 (ref 7–25)
CALCIUM SPEC-SCNC: 8.3 MG/DL (ref 8.6–10.5)
CHLORIDE SERPL-SCNC: 102 MMOL/L (ref 98–107)
CO2 SERPL-SCNC: 25.6 MMOL/L (ref 22–29)
CREAT BLD-MCNC: 0.57 MG/DL (ref 0.57–1)
DEPRECATED RDW RBC AUTO: 45.6 FL (ref 37–54)
EOSINOPHIL # BLD AUTO: 0.39 10*3/MM3 (ref 0–0.4)
EOSINOPHIL NFR BLD AUTO: 6.6 % (ref 0.3–6.2)
ERYTHROCYTE [DISTWIDTH] IN BLOOD BY AUTOMATED COUNT: 13.2 % (ref 12.3–15.4)
GFR SERPL CREATININE-BSD FRML MDRD: 100 ML/MIN/1.73
GLUCOSE BLD-MCNC: 92 MG/DL (ref 65–99)
HCT VFR BLD AUTO: 32.9 % (ref 34–46.6)
HGB BLD-MCNC: 10.5 G/DL (ref 12–15.9)
IMM GRANULOCYTES # BLD AUTO: 0.02 10*3/MM3 (ref 0–0.05)
IMM GRANULOCYTES NFR BLD AUTO: 0.3 % (ref 0–0.5)
LYMPHOCYTES # BLD AUTO: 1.45 10*3/MM3 (ref 0.7–3.1)
LYMPHOCYTES NFR BLD AUTO: 24.4 % (ref 19.6–45.3)
MCH RBC QN AUTO: 30.2 PG (ref 26.6–33)
MCHC RBC AUTO-ENTMCNC: 31.9 G/DL (ref 31.5–35.7)
MCV RBC AUTO: 94.5 FL (ref 79–97)
MONOCYTES # BLD AUTO: 0.69 10*3/MM3 (ref 0.1–0.9)
MONOCYTES NFR BLD AUTO: 11.6 % (ref 5–12)
NEUTROPHILS # BLD AUTO: 3.37 10*3/MM3 (ref 1.7–7)
NEUTROPHILS NFR BLD AUTO: 56.6 % (ref 42.7–76)
NRBC BLD AUTO-RTO: 0 /100 WBC (ref 0–0.2)
PLATELET # BLD AUTO: 217 10*3/MM3 (ref 140–450)
PMV BLD AUTO: 8.8 FL (ref 6–12)
POTASSIUM BLD-SCNC: 4.6 MMOL/L (ref 3.5–5.2)
RBC # BLD AUTO: 3.48 10*6/MM3 (ref 3.77–5.28)
SODIUM BLD-SCNC: 134 MMOL/L (ref 136–145)
WBC NRBC COR # BLD: 5.95 10*3/MM3 (ref 3.4–10.8)

## 2020-01-27 PROCEDURE — 71046 X-RAY EXAM CHEST 2 VIEWS: CPT

## 2020-01-27 PROCEDURE — 94799 UNLISTED PULMONARY SVC/PX: CPT

## 2020-01-27 PROCEDURE — 97535 SELF CARE MNGMENT TRAINING: CPT

## 2020-01-27 PROCEDURE — 25010000002 CEFEPIME PER 500 MG: Performed by: INTERNAL MEDICINE

## 2020-01-27 PROCEDURE — 94760 N-INVAS EAR/PLS OXIMETRY 1: CPT

## 2020-01-27 PROCEDURE — 99239 HOSP IP/OBS DSCHRG MGMT >30: CPT | Performed by: INTERNAL MEDICINE

## 2020-01-27 PROCEDURE — 99232 SBSQ HOSP IP/OBS MODERATE 35: CPT | Performed by: INTERNAL MEDICINE

## 2020-01-27 PROCEDURE — 85025 COMPLETE CBC W/AUTO DIFF WBC: CPT | Performed by: INTERNAL MEDICINE

## 2020-01-27 PROCEDURE — 97165 OT EVAL LOW COMPLEX 30 MIN: CPT

## 2020-01-27 PROCEDURE — 97161 PT EVAL LOW COMPLEX 20 MIN: CPT

## 2020-01-27 PROCEDURE — 80048 BASIC METABOLIC PNL TOTAL CA: CPT | Performed by: INTERNAL MEDICINE

## 2020-01-27 RX ORDER — CEFDINIR 300 MG/1
300 CAPSULE ORAL 2 TIMES DAILY
Qty: 10 CAPSULE | Refills: 0 | Status: SHIPPED | OUTPATIENT
Start: 2020-01-27 | End: 2020-02-01

## 2020-01-27 RX ORDER — FUROSEMIDE 40 MG/1
40 TABLET ORAL DAILY
Start: 2020-01-28 | End: 2021-01-01 | Stop reason: HOSPADM

## 2020-01-27 RX ADMIN — TRAMADOL HYDROCHLORIDE 50 MG: 50 TABLET, FILM COATED ORAL at 08:42

## 2020-01-27 RX ADMIN — NIFEDIPINE 30 MG: 30 TABLET, FILM COATED, EXTENDED RELEASE ORAL at 10:23

## 2020-01-27 RX ADMIN — TRAMADOL HYDROCHLORIDE 50 MG: 50 TABLET, FILM COATED ORAL at 00:17

## 2020-01-27 RX ADMIN — Medication 1 CAPSULE: at 10:23

## 2020-01-27 RX ADMIN — SODIUM CHLORIDE, PRESERVATIVE FREE 10 ML: 5 INJECTION INTRAVENOUS at 10:27

## 2020-01-27 RX ADMIN — CEFEPIME 2 G: 2 INJECTION, POWDER, FOR SOLUTION INTRAVENOUS at 10:23

## 2020-01-27 RX ADMIN — TRAMADOL HYDROCHLORIDE 50 MG: 50 TABLET, FILM COATED ORAL at 15:33

## 2020-01-27 RX ADMIN — CETIRIZINE HYDROCHLORIDE 5 MG: 10 TABLET, FILM COATED ORAL at 08:43

## 2020-01-27 RX ADMIN — LEVOTHYROXINE SODIUM 50 MCG: 50 TABLET ORAL at 08:42

## 2020-01-27 RX ADMIN — FAMOTIDINE 40 MG: 20 TABLET ORAL at 08:42

## 2020-01-27 RX ADMIN — BUDESONIDE 0.5 MG: 0.5 SUSPENSION RESPIRATORY (INHALATION) at 08:05

## 2020-01-27 RX ADMIN — ARFORMOTEROL TARTRATE 15 MCG: 15 SOLUTION RESPIRATORY (INHALATION) at 08:05

## 2020-01-27 RX ADMIN — CITALOPRAM HYDROBROMIDE 20 MG: 20 TABLET ORAL at 08:43

## 2020-01-27 RX ADMIN — IPRATROPIUM BROMIDE AND ALBUTEROL SULFATE 3 ML: .5; 3 SOLUTION RESPIRATORY (INHALATION) at 11:30

## 2020-01-28 ENCOUNTER — READMISSION MANAGEMENT (OUTPATIENT)
Dept: CALL CENTER | Facility: HOSPITAL | Age: 85
End: 2020-01-28

## 2020-01-28 NOTE — OUTREACH NOTE
Prep Survey      Responses   Facility patient discharged from?  [facility]   Is LACE score < 7 ?  No   Is patient eligible?  Yes   Discharge diagnosis  sepsis,  UTI,  hyponatremia,  hypothyroidism,  CHF,  enteritis due to norovirus   Does the patient have one of the following disease processes/diagnoses(primary or secondary)?  Sepsis   Does the patient have Home health ordered?  Yes   What is the Home health agency?   [facility]   Is there a DME ordered?  No   Comments regarding appointments  see AVS   Prep survey completed?  Yes          Jasmyne Carrasco RN

## 2020-01-29 LAB
BACTERIA SPEC AEROBE CULT: NORMAL
BACTERIA SPEC AEROBE CULT: NORMAL

## 2020-01-30 ENCOUNTER — READMISSION MANAGEMENT (OUTPATIENT)
Dept: CALL CENTER | Facility: HOSPITAL | Age: 85
End: 2020-01-30

## 2020-01-31 ENCOUNTER — READMISSION MANAGEMENT (OUTPATIENT)
Dept: CALL CENTER | Facility: HOSPITAL | Age: 85
End: 2020-01-31

## 2020-01-31 NOTE — OUTREACH NOTE
Sepsis Week 1 Survey      Responses   Facility patient discharged from?  [facility]   Does the patient have one of the following disease processes/diagnoses(primary or secondary)?  Sepsis   Is there a successful TCM telephone encounter documented?  No   Week 1 attempt successful?  No   Unsuccessful attempts  Attempt 1          Wilma Hagan RN

## 2020-01-31 NOTE — OUTREACH NOTE
Sepsis Week 1 Survey      Responses   Facility patient discharged from?  [facility]   Does the patient have one of the following disease processes/diagnoses(primary or secondary)?  Sepsis   Is there a successful TCM telephone encounter documented?  No   Week 1 attempt successful?  No   Unsuccessful attempts  Attempt 2          Collette Villarreal RN

## 2020-02-04 ENCOUNTER — READMISSION MANAGEMENT (OUTPATIENT)
Dept: CALL CENTER | Facility: HOSPITAL | Age: 85
End: 2020-02-04

## 2020-02-04 NOTE — OUTREACH NOTE
Sepsis Week 2 Survey      Responses   Facility patient discharged from?  [facility]   Does the patient have one of the following disease processes/diagnoses(primary or secondary)?  Sepsis   Week 2 attempt successful?  Yes   Call start time  1313   Call end time  1320   Discharge diagnosis  sepsis,  UTI,  hyponatremia,  hypothyroidism,  CHF,  enteritis due to norovirus   Is patient permission given to speak with other caregiver?  No   Meds reviewed with patient/caregiver?  Yes   Is the patient having any side effects they believe may be caused by any medication additions or changes?  No   Does the patient have all medications related to this admission filled (includes all antibiotics, inhalers, nebulizers,steroids,etc.)  Yes   Is the patient taking all medications as directed (includes completed medication regime)?  Yes   Does the patient have a primary care provider?   Yes   Comments regarding PCP  Follow up with CALEB Rausch    Does the patient have an appointment with their PCP within 7 days of discharge?  Yes [Patient states that the NP came to her house to see her a couple days after discharge. ]   Has the patient kept scheduled appointments due by today?  N/A   What is the Home health agency?   [facility]    Has home health visited the patient within 72 hours of discharge?  Yes   Psychosocial issues?  No   Did the patient receive a copy of their discharge instructions?  Yes   Nursing interventions  Reviewed instructions with patient   What is the patient's perception of their health status since discharge?  Improving   Nursing interventions  Nurse provided patient education   Is the patient/caregiver able to teach back Sepsis?  S - Shivering,fever or very cold   Nursing interventions  Nurse provided reassurance to patient, Nurse provided patient education   Is patient/caregiver able to teach back steps to recovery at home?  Set small, achievable goals for return to baseline health, Rest and regain  strength   Is the patient/caregiver able to teach back signs and symptoms of worsening condition:  Shortness of breath/rapid respiratory rate, Fever   Is the patient/caregiver able to teach back the hierarchy of who to call/visit for symptoms/problems? PCP, Specialist, Home health nurse, Urgent Care, ED, 911  Yes   Week 2 call completed?  Yes          Lisseth Archuleta RN

## 2020-02-11 ENCOUNTER — READMISSION MANAGEMENT (OUTPATIENT)
Dept: CALL CENTER | Facility: HOSPITAL | Age: 85
End: 2020-02-11

## 2020-02-11 NOTE — OUTREACH NOTE
"Sepsis Week 3 Survey      Responses   Facility patient discharged from?  [facility]   Does the patient have one of the following disease processes/diagnoses(primary or secondary)?  Sepsis   Week 3 attempt successful?  Yes   Call start time  1620   Call end time  1626   Discharge diagnosis  sepsis,  UTI,  hyponatremia,  hypothyroidism,  CHF,  enteritis due to norovirus   Does the patient have a primary care provider?   Yes   Comments regarding PCP  Follow up with CALEB Rausch    Has the patient kept scheduled appointments due by today?  Yes   Comments  Pt's PCP comes to home   What is the Home health agency?   [facility]   Has home health visited the patient within 72 hours of discharge?  Yes   DME comments  pt has BSC, nebulizer and QHS O2.    Psychosocial issues?  No   Comments  Pt c/o weakness/fatigue, denies N/V but having diarrhea and flatulence. Pt having wheezing/cough. HH is doing UA. pt reports her \"bottom\" is red/irratated, using skin barrier HH gave her.    What is the patient's perception of their health status since discharge?  Improving   Is the patient/caregiver able to teach back Sepsis?  S - Shivering,fever or very cold, P - Pale or discolored skin, I -   I feel like I might die-a feeling of hopelessness   Is patient/caregiver able to teach back steps to recovery at home?  Record milestones and struggles in a journal, Set small, achievable goals for return to baseline health   Is the patient/caregiver able to teach back signs and symptoms of worsening condition:  Rapid heart rate (>90), Fever   Is the patient/caregiver able to teach back the hierarchy of who to call/visit for symptoms/problems? PCP, Specialist, Home health nurse, Urgent Care, ED, 911  Yes   Week 3 call completed?  Yes          Selena Hunter RN  "

## 2020-02-14 ENCOUNTER — LAB REQUISITION (OUTPATIENT)
Dept: LAB | Facility: HOSPITAL | Age: 85
End: 2020-02-14

## 2020-02-14 DIAGNOSIS — N39.0 URINARY TRACT INFECTION, SITE NOT SPECIFIED: ICD-10-CM

## 2020-02-14 LAB
BACTERIA UR QL AUTO: ABNORMAL /HPF
BILIRUB UR QL STRIP: NEGATIVE
CLARITY UR: CLEAR
COLOR UR: ABNORMAL
GLUCOSE UR STRIP-MCNC: NEGATIVE MG/DL
HGB UR QL STRIP.AUTO: NEGATIVE
HYALINE CASTS UR QL AUTO: ABNORMAL /LPF
KETONES UR QL STRIP: NEGATIVE
LEUKOCYTE ESTERASE UR QL STRIP.AUTO: ABNORMAL
NITRITE UR QL STRIP: NEGATIVE
PH UR STRIP.AUTO: 7 [PH] (ref 4.5–8)
PROT UR QL STRIP: NEGATIVE
RBC # UR: ABNORMAL /HPF
REF LAB TEST METHOD: ABNORMAL
SP GR UR STRIP: 1.02 (ref 1–1.03)
SQUAMOUS #/AREA URNS HPF: ABNORMAL /HPF
UROBILINOGEN UR QL STRIP: ABNORMAL
WBC UR QL AUTO: ABNORMAL /HPF

## 2020-02-14 PROCEDURE — 87086 URINE CULTURE/COLONY COUNT: CPT | Performed by: PHYSICAL MEDICINE & REHABILITATION

## 2020-02-14 PROCEDURE — 81001 URINALYSIS AUTO W/SCOPE: CPT | Performed by: PHYSICAL MEDICINE & REHABILITATION

## 2020-02-15 LAB — BACTERIA SPEC AEROBE CULT: NO GROWTH

## 2020-02-19 ENCOUNTER — READMISSION MANAGEMENT (OUTPATIENT)
Dept: CALL CENTER | Facility: HOSPITAL | Age: 85
End: 2020-02-19

## 2020-02-19 NOTE — OUTREACH NOTE
Sepsis Week 4 Survey      Responses   Facility patient discharged from?  [facility]   Does the patient have one of the following disease processes/diagnoses(primary or secondary)?  Sepsis   Week 4 attempt successful?  Yes   Call start time  1636   Call end time  1639   Discharge diagnosis  sepsis,  UTI,  hyponatremia,  hypothyroidism,  CHF,  enteritis due to norovirus   Meds reviewed with patient/caregiver?  Yes   Is the patient having any side effects they believe may be caused by any medication additions or changes?  No   Is the patient taking all medications as directed (includes completed medication regime)?  Yes   Has the patient kept scheduled appointments due by today?  Yes   Is the patient still receiving Home Health Services?  Yes   Psychosocial issues?  No   Comments  appetite somewhat better, still has some diarrhea   What is the patient's perception of their health status since discharge?  Improving   Nursing interventions  Nurse provided patient education   Is the patient/caregiver able to teach back Sepsis?  S - Shivering,fever or very cold, E - Extreme pain or generalized discomfort (worst ever,especially abdomen), P - Pale or discolored skin, S - Sleepy, difficult to arouse,confused, I -   I feel like I might die-a feeling of hopelessness, S - Short of breath   Nursing interventions  Nurse provided reassurance to patient   Is the patient/caregiver able to teach back signs and symptoms of worsening condition:  Fever, Rapid heart rate (>90), Altered mental status(confusion/coma), Hyperthermia, Shortness of breath/rapid respiratory rate, Edema   Is the patient/caregiver able to teach back the hierarchy of who to call/visit for symptoms/problems? PCP, Specialist, Home health nurse, Urgent Care, ED, 911  Yes   Week 4 call completed?  Yes   Would the patient like one additional call?  No   Graduated  Yes   Did the patient feel the follow up calls were helpful during their recovery period?  Yes   Was the  number of calls appropriate?  Yes          Linda Callahan RN

## 2020-02-28 ENCOUNTER — LAB REQUISITION (OUTPATIENT)
Dept: LAB | Facility: HOSPITAL | Age: 85
End: 2020-02-28

## 2020-02-28 DIAGNOSIS — A08.11 ACUTE GASTROENTEROPATHY DUE TO NORWALK AGENT: ICD-10-CM

## 2020-02-28 DIAGNOSIS — N39.0 URINARY TRACT INFECTION, SITE NOT SPECIFIED: ICD-10-CM

## 2020-02-28 LAB
BACTERIA UR QL AUTO: ABNORMAL /HPF
BILIRUB UR QL STRIP: NEGATIVE
CLARITY UR: CLEAR
COLOR UR: YELLOW
GLUCOSE UR STRIP-MCNC: NEGATIVE MG/DL
HGB UR QL STRIP.AUTO: ABNORMAL
HYALINE CASTS UR QL AUTO: ABNORMAL /LPF
KETONES UR QL STRIP: NEGATIVE
LEUKOCYTE ESTERASE UR QL STRIP.AUTO: ABNORMAL
NITRITE UR QL STRIP: NEGATIVE
PH UR STRIP.AUTO: 5.5 [PH] (ref 4.5–8)
PROT UR QL STRIP: NEGATIVE
RBC # UR: ABNORMAL /HPF
REF LAB TEST METHOD: ABNORMAL
SP GR UR STRIP: 1.01 (ref 1–1.03)
SQUAMOUS #/AREA URNS HPF: ABNORMAL /HPF
UROBILINOGEN UR QL STRIP: ABNORMAL
WBC UR QL AUTO: ABNORMAL /HPF

## 2020-02-28 PROCEDURE — 87086 URINE CULTURE/COLONY COUNT: CPT | Performed by: PHYSICAL MEDICINE & REHABILITATION

## 2020-02-28 PROCEDURE — 81001 URINALYSIS AUTO W/SCOPE: CPT | Performed by: PHYSICAL MEDICINE & REHABILITATION

## 2020-02-29 LAB — BACTERIA SPEC AEROBE CULT: NORMAL

## 2020-03-04 ENCOUNTER — LAB REQUISITION (OUTPATIENT)
Dept: LAB | Facility: HOSPITAL | Age: 85
End: 2020-03-04

## 2020-03-04 DIAGNOSIS — I11.0 HYPERTENSIVE HEART DISEASE WITH HEART FAILURE (HCC): ICD-10-CM

## 2020-03-04 DIAGNOSIS — A08.11 ACUTE GASTROENTEROPATHY DUE TO NORWALK AGENT: ICD-10-CM

## 2020-03-04 DIAGNOSIS — N39.0 URINARY TRACT INFECTION, SITE NOT SPECIFIED: ICD-10-CM

## 2020-03-04 LAB
BACTERIA UR QL AUTO: ABNORMAL /HPF
BILIRUB UR QL STRIP: NEGATIVE
CLARITY UR: ABNORMAL
COLOR UR: ABNORMAL
GLUCOSE UR STRIP-MCNC: NEGATIVE MG/DL
HGB UR QL STRIP.AUTO: ABNORMAL
HYALINE CASTS UR QL AUTO: ABNORMAL /LPF
KETONES UR QL STRIP: NEGATIVE
LEUKOCYTE ESTERASE UR QL STRIP.AUTO: ABNORMAL
NITRITE UR QL STRIP: NEGATIVE
PH UR STRIP.AUTO: 7 [PH] (ref 4.5–8)
PROT UR QL STRIP: ABNORMAL
RBC # UR: ABNORMAL /HPF
REF LAB TEST METHOD: ABNORMAL
SP GR UR STRIP: 1.02 (ref 1–1.03)
SQUAMOUS #/AREA URNS HPF: ABNORMAL /HPF
UROBILINOGEN UR QL STRIP: ABNORMAL
WBC UR QL AUTO: ABNORMAL /HPF

## 2020-03-04 PROCEDURE — 87077 CULTURE AEROBIC IDENTIFY: CPT | Performed by: PHYSICAL MEDICINE & REHABILITATION

## 2020-03-04 PROCEDURE — 81001 URINALYSIS AUTO W/SCOPE: CPT | Performed by: PHYSICAL MEDICINE & REHABILITATION

## 2020-03-04 PROCEDURE — 87186 SC STD MICRODIL/AGAR DIL: CPT | Performed by: PHYSICAL MEDICINE & REHABILITATION

## 2020-03-04 PROCEDURE — 87086 URINE CULTURE/COLONY COUNT: CPT | Performed by: PHYSICAL MEDICINE & REHABILITATION

## 2020-03-06 LAB — BACTERIA SPEC AEROBE CULT: ABNORMAL

## 2020-03-20 ENCOUNTER — APPOINTMENT (OUTPATIENT)
Dept: GENERAL RADIOLOGY | Facility: HOSPITAL | Age: 85
End: 2020-03-20

## 2020-03-20 ENCOUNTER — LAB REQUISITION (OUTPATIENT)
Dept: LAB | Facility: HOSPITAL | Age: 85
End: 2020-03-20

## 2020-03-20 ENCOUNTER — HOSPITAL ENCOUNTER (EMERGENCY)
Facility: HOSPITAL | Age: 85
Discharge: HOME | End: 2020-03-20
Attending: EMERGENCY MEDICINE | Admitting: EMERGENCY MEDICINE

## 2020-03-20 VITALS
DIASTOLIC BLOOD PRESSURE: 78 MMHG | SYSTOLIC BLOOD PRESSURE: 142 MMHG | WEIGHT: 187.31 LBS | HEART RATE: 71 BPM | OXYGEN SATURATION: 96 % | TEMPERATURE: 99 F | RESPIRATION RATE: 16 BRPM | HEIGHT: 68 IN | BODY MASS INDEX: 28.39 KG/M2
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
## 2020-03-20 DIAGNOSIS — N39.0 URINARY TRACT INFECTION, SITE NOT SPECIFIED: ICD-10-CM

## 2020-03-20 DIAGNOSIS — S79.911A HIP INJURY, RIGHT, INITIAL ENCOUNTER: ICD-10-CM

## 2020-03-20 DIAGNOSIS — A08.11 ACUTE GASTROENTEROPATHY DUE TO NORWALK AGENT: ICD-10-CM

## 2020-03-20 DIAGNOSIS — I11.0 HYPERTENSIVE HEART DISEASE WITH HEART FAILURE (HCC): ICD-10-CM

## 2020-03-20 DIAGNOSIS — S50.11XA CONTUSION OF RIGHT FOREARM, INITIAL ENCOUNTER: ICD-10-CM

## 2020-03-20 LAB
BACTERIA UR QL AUTO: ABNORMAL /HPF
BILIRUB UR QL STRIP: NEGATIVE
CLARITY UR: CLEAR
COLOR UR: YELLOW
GLUCOSE UR STRIP-MCNC: NEGATIVE MG/DL
HGB UR QL STRIP.AUTO: NEGATIVE
HYALINE CASTS UR QL AUTO: ABNORMAL /LPF
KETONES UR QL STRIP: NEGATIVE
LEUKOCYTE ESTERASE UR QL STRIP.AUTO: ABNORMAL
NITRITE UR QL STRIP: NEGATIVE
PH UR STRIP.AUTO: 6.5 [PH] (ref 4.5–8)
PROT UR QL STRIP: NEGATIVE
RBC # UR: ABNORMAL /HPF
REF LAB TEST METHOD: ABNORMAL
SP GR UR STRIP: 1.01 (ref 1–1.03)
SQUAMOUS #/AREA URNS HPF: ABNORMAL /HPF
UROBILINOGEN UR QL STRIP: ABNORMAL
WBC UR QL AUTO: ABNORMAL /HPF

## 2020-03-20 PROCEDURE — 81001 URINALYSIS AUTO W/SCOPE: CPT | Performed by: PHYSICAL MEDICINE & REHABILITATION

## 2020-03-20 PROCEDURE — 73502 X-RAY EXAM HIP UNI 2-3 VIEWS: CPT

## 2020-03-20 PROCEDURE — 99283 EMERGENCY DEPT VISIT LOW MDM: CPT

## 2020-03-20 PROCEDURE — 99282 EMERGENCY DEPT VISIT SF MDM: CPT | Performed by: PHYSICIAN ASSISTANT

## 2020-04-20 PROBLEM — I50.32 CHRONIC DIASTOLIC CHF (CONGESTIVE HEART FAILURE) (HCC): Chronic | Status: ACTIVE | Noted: 2020-01-24

## 2020-04-20 PROBLEM — I25.10 CORONARY ARTERY DISEASE: Status: ACTIVE | Noted: 2020-01-01

## 2020-04-20 PROBLEM — I25.10 CORONARY ARTERY DISEASE: Chronic | Status: ACTIVE | Noted: 2020-01-01

## 2020-04-20 NOTE — PROGRESS NOTES
Subjective:     Encounter Date: 04/20/20        Patient ID: Eduarda Lopez is a 88 y.o. female.    Chief Complaint: SOB, CP  History of Present Illness    Dear Lisseth,    This patient has consented to a telehealth visit via audio. The visit was scheduled as a audio visit to comply with patient safety concerns in accordance with CDC recommendations.  All vitals recorded within this visit are reported by the patient.  I spent 20 minutes in total including but not limited to the 12 minutes spent in direct conversation with this patient.     She has a history of CAD.  It is been 1 year since her last visit.    She was in the emergency room about a month ago.  She had gone to the kitchen in her power chair to use the microwave.  She stood up to use the microwave and when she sat back down she missed the power chair and landed on the ground.  She was not dizzy.  She did not pass out.  She had no chest pain or chest discomfort.  She came to the emergency room, was evaluated, and everything was okay.  She has not passed out at any time.  She denies any chest pain or chest discomfort.  No shortness of breath.  No feeling of tachycardia.  No lower extremity edema.    We saw her in 2018 and  at that point performed an echocardiogram and a stress test because she was having some dyspnea with activity.  These 2 studies basically looked fine.    Echocardiogram June 2018:  Interpretation Summary     · Left ventricular systolic function is normal. Calculated EF = 52%. Estimated EF appears to be in the range of 56 - 60%. Normal left ventricular cavity size and wall thickness noted. All left ventricular wall segments contract normally. Left ventricular diastolic dysfunction is noted (grade I) consistent with impaired relaxation.        Stress test June 2018:  Interpretation Summary     · Left ventricular ejection fraction is normal (Calculated EF = 70%).  · Myocardial perfusion imaging indicates a normal myocardial perfusion study  with no evidence of ischemia.  · Impressions are consistent with a low risk study.            Last year she had an echocardiogram performed that showed diastolic dysfunction with grade 2 diastolic dysfunction.  Systolic function was normal.  She doesn't recall why that echocardiogram was ordered.    The following portions of the patient's history were reviewed and updated as appropriate: allergies, current medications, past family history, past medical history, past social history, past surgical history and problem list.    Past Medical History:   Diagnosis Date   • Arthritis    • Asthma    • CHF (congestive heart failure) (CMS/[facility])    • Disease of thyroid gland    • Fracture of hip (CMS/HCC)    • Hyperlipidemia    • Hypertension    • Thyroid disease        Past Surgical History:   Procedure Laterality Date   • CHOLECYSTECTOMY     • HIP SURGERY     • HYSTERECTOMY     • JOINT REPLACEMENT     • SHOULDER SURGERY     • THYROID SURGERY     • TONSILLECTOMY         Social History     Socioeconomic History   • Marital status:      Spouse name: Not on file   • Number of children: Not on file   • Years of education: Not on file   • Highest education level: Not on file   Tobacco Use   • Smoking status: Former Smoker   • Smokeless tobacco: Never Used   • Tobacco comment: caff use    Substance and Sexual Activity   • Alcohol use: No   • Drug use: No       Review of Systems   Constitution: Positive for malaise/fatigue. Negative for chills, decreased appetite, fever and night sweats.   HENT: Negative for ear discharge, ear pain, hearing loss, nosebleeds and sore throat.    Eyes: Negative for blurred vision, double vision and pain.   Cardiovascular: Negative for cyanosis.   Respiratory: Negative for hemoptysis and sputum production.    Endocrine: Negative for cold intolerance and heat intolerance.   Hematologic/Lymphatic: Negative for adenopathy.   Skin: Negative for dry skin, itching, nail changes, rash and suspicious  "lesions.   Musculoskeletal: Positive for back pain and joint pain. Negative for arthritis, gout, muscle cramps, muscle weakness, myalgias and neck pain.   Gastrointestinal: Negative for anorexia, bowel incontinence, constipation, diarrhea, dysphagia, hematemesis and jaundice.   Genitourinary: Negative for bladder incontinence, dysuria, flank pain, frequency, hematuria and nocturia.   Neurological: Positive for weakness. Negative for focal weakness, numbness, paresthesias and seizures.   Psychiatric/Behavioral: Positive for memory loss. Negative for altered mental status, hallucinations, hypervigilance, suicidal ideas and thoughts of violence.   Allergic/Immunologic: Negative for persistent infections.       Procedures       Objective:     Vitals:    04/20/20 0929   Weight: 84.8 kg (187 lb)   Height: 172.7 cm (68\")         Alert and oriented x3, thought processes normal, judgment normal, speaking in full sentences with no wheezing and no respiratory distress. Hearing is appropriate without difficulty.  Lab Review:             Performed        Assessment:          Diagnosis Plan   1. Chronic diastolic CHF (congestive heart failure) (CMS/[facility])     2. Coronary artery disease involving native coronary artery of native heart with other form of angina pectoris (CMS/[facility])            Plan:       1. Coronary Artery Disease  Assessment  • The patient has no angina    Plan  • Lifestyle modifications discussed include adhering to a heart healthy diet, medication compliance and regular monitoring of cholesterol and blood pressure    Subjective - Objective  • Current antiplatelet therapy includes aspirin 81 mg      Thank you very much for allowing us to participate in the care of this pleasant patient.  Please don't hesitate to call if I can be of assistance in any way.      Current Outpatient Medications:   •  acetaminophen (TYLENOL) 650 MG 8 hr tablet, Take 650 mg by mouth Every 8 (Eight) Hours As Needed for Mild Pain ., Disp: , " Rfl:   •  aspirin 81 MG tablet, Take 81 mg by mouth Daily., Disp: , Rfl:   •  budesonide (PULMICORT) 1 MG/2ML nebulizer solution, Take 1 mg by nebulization Daily., Disp: , Rfl:   •  Cetirizine HCl 10 MG capsule, Take 10 mg by mouth Daily., Disp: , Rfl:   •  Cholecalciferol (VITAMIN D3) 2000 units tablet, Take 1 tablet by mouth Daily., Disp: , Rfl:   •  citalopram (CeleXA) 20 MG tablet, Take 20 mg by mouth Daily., Disp: , Rfl:   •  clonazePAM (KlonoPIN) 0.5 MG tablet, Take 0.5 mg by mouth Daily., Disp: , Rfl:   •  docusate sodium (COLACE) 100 MG capsule, Take 100 mg by mouth 2 (Two) Times a Day., Disp: , Rfl:   •  famotidine (PEPCID) 40 MG tablet, Take 40 mg by mouth Daily., Disp: , Rfl:   •  fluticasone (FLONASE) 50 MCG/ACT nasal spray, into each nostril daily., Disp: , Rfl:   •  formoterol (PERFOROMIST) 20 MCG/2ML nebulizer solution, Take  by nebulization 2 (Two) Times a Day., Disp: , Rfl:   •  furosemide (LASIX) 40 MG tablet, Take 1 tablet by mouth Daily. 1 tab AM, Disp: , Rfl:   •  guaiFENesin (MUCINEX) 600 MG 12 hr tablet, Take 1,200 mg by mouth 2 (Two) Times a Day., Disp: , Rfl:   •  ipratropium-albuterol (DUO-NEB) 0.5-2.5 mg/3 ml nebulizer, Take 3 mL by nebulization Every 4 (Four) Hours As Needed for Wheezing., Disp: , Rfl:   •  levothyroxine (SYNTHROID, LEVOTHROID) 50 MCG tablet, Take 50 mcg by mouth Daily., Disp: , Rfl:   •  Multiple Vitamin (MULTI VITAMIN DAILY PO), Take 1 tablet by mouth Daily., Disp: , Rfl:   •  NIFEdipine CC (ADALAT CC) 30 MG 24 hr tablet, Take 30 mg by mouth Daily., Disp: , Rfl:   •  Polyethylene Glycol 3350 (MIRALAX PO), Take 1 tablet by mouth Daily., Disp: , Rfl:   •  traMADol (ULTRAM) 50 MG tablet, Take 50 mg by mouth 3 (Three) Times a Day., Disp: , Rfl:

## 2020-08-22 ENCOUNTER — INPATIENT HOSPITAL (OUTPATIENT)
Dept: URBAN - METROPOLITAN AREA HOSPITAL 107 | Facility: HOSPITAL | Age: 85
End: 2020-08-22

## 2020-08-22 DIAGNOSIS — R10.13 EPIGASTRIC PAIN: ICD-10-CM

## 2020-08-22 DIAGNOSIS — R19.5 OTHER FECAL ABNORMALITIES: ICD-10-CM

## 2020-08-22 DIAGNOSIS — R63.3 FEEDING DIFFICULTIES: ICD-10-CM

## 2020-08-22 PROCEDURE — 99222 1ST HOSP IP/OBS MODERATE 55: CPT | Performed by: INTERNAL MEDICINE

## 2020-09-18 PROBLEM — J44.1 CHRONIC OBSTRUCTIVE PULMONARY DISEASE WITH ACUTE EXACERBATION (HCC): Status: ACTIVE | Noted: 2020-01-01

## 2020-09-18 PROBLEM — D64.9 ANEMIA: Status: ACTIVE | Noted: 2020-01-01

## 2020-09-18 PROBLEM — R07.89 CHEST PAIN, ATYPICAL: Status: ACTIVE | Noted: 2020-01-01

## 2020-09-18 PROBLEM — R11.2 NAUSEA & VOMITING: Status: ACTIVE | Noted: 2020-01-01

## 2020-09-18 PROBLEM — I45.10 RBBB: Status: ACTIVE | Noted: 2020-01-01

## 2020-09-18 PROBLEM — D64.9 ANEMIA: Chronic | Status: ACTIVE | Noted: 2020-01-01

## 2020-09-18 PROBLEM — S36.029A SPLEEN HEMATOMA: Status: ACTIVE | Noted: 2020-01-01

## 2020-09-18 PROBLEM — I48.0 PAF (PAROXYSMAL ATRIAL FIBRILLATION) (HCC): Chronic | Status: ACTIVE | Noted: 2020-01-01

## 2020-09-23 NOTE — OUTREACH NOTE
Prep Survey      Responses   [facility] patient discharged from?  [facility]   Is LACE score < 7 ?  No   Eligibility  Readm Mgmt   Discharge diagnosis  Spleen hematoma    Does the patient have one of the following disease processes/diagnoses(primary or secondary)?  Other   Does the patient have Home health ordered?  Yes   What is the Home health agency?   [facility]     Is there a DME ordered?  No   Prep survey completed?  Yes          Delmis Godoy RN

## 2020-10-07 NOTE — OUTREACH NOTE
Medical Week 2 Survey      Responses   [facility] patient discharged from?  [place]   Does the patient have one of the following disease processes/diagnoses(primary or secondary)?  Other   Week 2 attempt successful?  Yes   Call start time  0958   Discharge diagnosis  Spleen hematoma    Call end time  1000   Is patient permission given to speak with other caregiver?  Yes   List who call center can speak with  Granddaughter--Breanna   Person spoke with today (if not patient) and relationship  Granddaughter, Breanna   Meds reviewed with patient/caregiver?  Yes   Is the patient taking all medications as directed (includes completed medication regime)?  Yes   Has the patient kept scheduled appointments due by today?  Yes   What is the Home health agency?   [facility]     Comments  Granddaughter states she is doing well, getting therapy and has been eating very well   What is the patient's perception of their health status since discharge?  Improving   Is the patient/caregiver able to teach back signs and symptoms related to disease process for when to call PCP?  Yes   Is the patient/caregiver able to teach back signs and symptoms related to disease process for when to call 911?  Yes   Is the patient/caregiver able to teach back the hierarchy of who to call/visit for symptoms/problems? PCP, Specialist, Home health nurse, Urgent Care, ED, 911  Yes   Additional teach back comments  Has several therapists coming in to the home and RN   Week 2 Call Completed?  Yes          Tanya Jensen, RN

## 2020-10-15 NOTE — OUTREACH NOTE
Medical Week 3 Survey      Responses   [facility] patient discharged from?  [place]   Does the patient have one of the following disease processes/diagnoses(primary or secondary)?  Other   Week 3 attempt successful?  Yes   Call start time  0912   Call end time  0913   Discharge diagnosis  Spleen hematoma    Is patient permission given to speak with other caregiver?  Yes   List who call center can speak with  Granddaughter--Breanna   Person spoke with today (if not patient) and relationship  Granddaughter, Breanna   Meds reviewed with patient/caregiver?  Yes   Is the patient taking all medications as directed (includes completed medication regime)?  Yes   Has the patient kept scheduled appointments due by today?  Yes   Comments  MD comes to the home.   Home health comments  Therapy will be d/c patient soom   Comments  Granddaughter states she is doing well, getting therapy and has been eating very well   What is the patient's perception of their health status since discharge?  Improving   Is the patient/caregiver able to teach back signs and symptoms related to disease process for when to call PCP?  Yes   Is the patient/caregiver able to teach back signs and symptoms related to disease process for when to call 911?  Yes   Is the patient/caregiver able to teach back the hierarchy of who to call/visit for symptoms/problems? PCP, Specialist, Home health nurse, Urgent Care, ED, 911  Yes   Week 3 Call Completed?  Yes          Tanya Jensen RN

## 2020-11-24 NOTE — ED PROVIDER NOTES
EMERGENCY DEPARTMENT ENCOUNTER    Room Number:  33/33  Date of encounter:  11/24/2020  PCP: Lisseth Vasquez APRN  Historian: Patient     I used full protective equipment while examining this patient.  This includes face mask, gloves and protective eyewear.  I washed my hands before entering the room and immediately upon leaving the room.  Patient was wearing a surgical mask.      HPI:  Chief Complaint: Problems urinating  A complete HPI/ROS/PMH/PSH/SH/FH are unobtainable due to: None    Context: Eduarda Lopez is a 88 y.o. female who presents to the ED problems urinating for the past several days.  Patient reports she is urinating less than normal and that it burns when she urinates.  She also reports increased urinary urgency.  She denies fever, chills, nausea, vomiting, abdominal pain, or flank pain.  Nothing makes her symptoms better or worse.  Patient does report a chronic cough but denies increased shortness of breath or chest pain..  She is on 2 L of oxygen as needed.      PAST MEDICAL HISTORY  Active Ambulatory Problems     Diagnosis Date Noted   • S/P hip hemiarthroplasty 02/23/2016   • Dislocation of hip prosthesis (CMS/[facility]) 02/23/2016   • Sepsis without acute organ dysfunction (CMS/[facility]) 01/24/2020   • Acute UTI (urinary tract infection) 01/24/2020   • Hyponatremia 01/24/2020   • Acquired hypothyroidism 01/24/2020   • Chronic diastolic CHF (congestive heart failure) (CMS/[facility]) 01/24/2020   • Enteritis due to Norovirus 01/25/2020   • Coronary artery disease 04/20/2020   • Spleen hematoma 09/18/2020   • Anemia 09/18/2020   • RBBB 09/18/2020   • PAF (paroxysmal atrial fibrillation) (CMS/[facility]) 09/18/2020   • Chest pain, atypical 09/18/2020   • Chronic obstructive pulmonary disease with acute exacerbation (CMS/[facility]) 09/18/2020   • Nausea & vomiting 09/18/2020     Resolved Ambulatory Problems     Diagnosis Date Noted   • No Resolved Ambulatory Problems     Past Medical History:   Diagnosis Date   • Arthritis    •  Asthma    • CHF (congestive heart failure) (CMS/H[facility])    • Disease of thyroid gland    • Fracture of hip (CMS/HCC)    • Hyperlipidemia    • Hypertension    • Thyroid disease          PAST SURGICAL HISTORY  Past Surgical History:   Procedure Laterality Date   • CHOLECYSTECTOMY     • HIP SURGERY     • HYSTERECTOMY     • JOINT REPLACEMENT     • SHOULDER SURGERY     • THYROID SURGERY     • TONSILLECTOMY           FAMILY HISTORY  Family History   Problem Relation Age of Onset   • Hypertension Mother    • Cancer Sister    • Diabetes Brother    • Hypertension Maternal Grandmother    • Heart disease Daughter    • Hypertension Daughter    • Hypertension Son    • Hypertension Daughter    • Sudden death Son          SOCIAL HISTORY  Social History     Socioeconomic History   • Marital status:      Spouse name: Not on file   • Number of children: Not on file   • Years of education: Not on file   • Highest education level: Not on file   Tobacco Use   • Smoking status: Former Smoker   • Smokeless tobacco: Never Used   • Tobacco comment: caff use    Substance and Sexual Activity   • Alcohol use: No   • Drug use: No         ALLERGIES  Codeine, Ibuprofen, Morphine and related, and Penicillins       REVIEW OF SYSTEMS  Review of Systems      All systems have been reviewed and are negative except as as discussed in the HPI    PHYSICAL EXAM    I have reviewed the triage vital signs and nursing notes.    ED Triage Vitals [11/24/20 1014]   Temp Heart Rate Resp BP SpO2   97.5 °F (36.4 °C) 78 20 170/92 90 %      Temp src Heart Rate Source Patient Position BP Location FiO2 (%)   -- -- -- -- --       Physical Exam  GENERAL: Awake, alert, very hard of hearing  HENT: NCAT, nares patent, moist mucous membranes  NECK: supple, no lymphadenopathy  EYES: no scleral icterus  CV: regular rhythm, regular rate, no murmur  RESPIRATORY: normal effort, clear to auscultation bilaterally  ABDOMEN: soft, nontender, no CVA tenderness  : External  genitalia is normal, no rash, no erythema  MUSCULOSKELETAL: Extremities are  without obvious deformity.  There is normal range of motion in all extremities.    NEURO: Strength, sensation, and coordination are grossly intact.  Speech and mentation are unremarkable.  No facial droop.  SKIN: warm, dry, no rash  PSYCH: Normal mood and affect      LAB RESULTS  Recent Results (from the past 24 hour(s))   Urinalysis With Microscopic If Indicated (No Culture) - Urine, Catheter    Collection Time: 11/24/20 10:49 AM    Specimen: Urine, Catheter   Result Value Ref Range    Color, UA Yellow Yellow, Straw    Appearance, UA Clear Clear    pH, UA 7.5 5.0 - 8.0    Specific Gravity, UA 1.011 1.005 - 1.030    Glucose, UA Negative Negative    Ketones, UA Negative Negative    Bilirubin, UA Negative Negative    Blood, UA Trace (A) Negative    Protein, UA Negative Negative    Leuk Esterase, UA Trace (A) Negative    Nitrite, UA Negative Negative    Urobilinogen, UA 1.0 E.U./dL 0.2 - 1.0 E.U./dL   Urinalysis, Microscopic Only - Urine, Catheter    Collection Time: 11/24/20 10:49 AM    Specimen: Urine, Catheter   Result Value Ref Range    RBC, UA 3-5 (A) None Seen, 0-2 /HPF    WBC, UA 13-20 (A) None Seen, 0-2 /HPF    Bacteria, UA None Seen None Seen /HPF    Squamous Epithelial Cells, UA 0-2 None Seen, 0-2 /HPF    Hyaline Casts, UA 0-2 None Seen /LPF    Methodology Automated Microscopy        Ordered the above labs and independently reviewed the results.      RADIOLOGY  No Radiology Exams Resulted Within Past 24 Hours    I ordered the above noted radiological studies. Reviewed by me and discussed with radiologist.  See dictation for official radiology interpretation.      PROCEDURES  Procedures      MEDICATIONS GIVEN IN ER    Medications   cefdinir (OMNICEF) capsule 300 mg (300 mg Oral Given 11/24/20 1249)         PROGRESS, DATA ANALYSIS, CONSULTS, AND MEDICAL DECISION MAKING    All labs have been independently reviewed by me.  All radiology  studies have been reviewed by me and discussed with radiologist dictating the report.   EKG's independently viewed and interpreted by me.  I have reviewed the nurse's notes, vital signs, past medical history, and medication list.  Discussion below represents my analysis of pertinent findings related to patient's condition, differential diagnosis, treatment plan and final disposition.      ED Course as of Nov 24 1509   Tue Nov 24, 2020   1035 Old records reviewed.  Patient was last admitted here in September 2020 for a splenic hematoma.    []   1209 Test results discussed with the patient.  She will be started on Omnicef.  Urine culture will be ordered.    []      ED Course User Index  [] Nic Lewis MD       AS OF 15:09 EST VITALS:    BP - 162/88  HR - 78  TEMP - 97.5 °F (36.4 °C)  O2 SATS - 98%      DIAGNOSIS  Final diagnoses:   Acute UTI         DISPOSITION  Discharge    DISCHARGE    Patient discharged in stable condition.    Reviewed implications of results, diagnosis, meds, responsibility to follow up, warning signs and symptoms of possible worsening, potential complications and reasons to return to ER, including fever, abdominal pain, flank pain, vomiting, or other concern..    Patient/Family voiced understanding of above instructions.    Discussed plan for discharge, as there is no emergent indication for admission. Patient referred to primary care provider for BP management due to today's BP. Pt/family is agreeable and understands need for follow up and repeat testing.  Pt is aware that discharge does not mean that nothing is wrong but it indicates no emergency is present that requires admission and they must continue care with follow-up as given below or physician of their choice.     FOLLOW-UP  Lisseth Vasquez, APRN  [address]  [facility] [address]  [place] [id number] [phone]    Schedule an appointment as soon as possible for a visit            Medication List      New Prescriptions     cefdinir 300 MG capsule  Commonly known as: OMNICEF  Take 1 capsule by mouth 2 (Two) Times a Day.           Where to Get Your Medications      You can get these medications from any pharmacy    Bring a paper prescription for each of these medications  · cefdinir 300 MG capsule             Dictated utilizing Dragon dictation:  Much of this encounter note is an electronic transcription/translation of spoken language to printed text. The electronic translation of spoken language may permit erroneous, or at times, nonsensical words or phrases to be inadvertently transcribed; Although I have reviewed the note for such errors, some may still exist.     Nic Lewis MD  11/24/20 2951

## 2020-11-24 NOTE — ED NOTES
Pt reports burning in genitals, & painful urination for 3 days. Pt has a frequent cough which she says her MD is treating as an URI.    Pt arrived wearing a face mask.     Marina Donald RN  11/24/20 1014

## 2020-11-24 NOTE — ED NOTES
"Pt presents to ED with complaints of dysuria for \" a couple of days\". Pt has complaints of vaginal burning and frequency. Pt denies abd pain, n/v/d. Pt states she has been dizziness the last couple of days.     MD at bedside    This RN wore appropriate PPE during this encounter.       Ibeth Richmond, RN  11/24/20 1037    "

## 2020-11-24 NOTE — DISCHARGE INSTRUCTIONS
Take antibiotic as prescribed.  Drink plenty of fluids.  Follow-up with your primary care provider in the next several days if symptoms persist.  Return to emergency department for fever, chills, nausea, vomiting, abdominal pain, flank pain, or other concern.

## 2020-12-30 PROBLEM — J44.1 COPD EXACERBATION (HCC): Status: ACTIVE | Noted: 2020-01-01

## 2020-12-31 PROBLEM — R79.89 ELEVATED D-DIMER: Status: ACTIVE | Noted: 2020-01-01

## 2020-12-31 PROBLEM — R09.02 HYPOXIA: Status: ACTIVE | Noted: 2020-01-01

## 2020-12-31 PROBLEM — J96.11 CHRONIC RESPIRATORY FAILURE WITH HYPOXIA (HCC): Chronic | Status: ACTIVE | Noted: 2020-01-01

## 2020-12-31 PROBLEM — Z79.01 CHRONIC ANTICOAGULATION: Chronic | Status: ACTIVE | Noted: 2020-01-01

## 2020-12-31 PROBLEM — E87.6 HYPOKALEMIA: Status: ACTIVE | Noted: 2020-01-01

## 2020-12-31 PROBLEM — L89.152 PRESSURE INJURY OF COCCYGEAL REGION, STAGE 2 (HCC): Status: ACTIVE | Noted: 2020-01-01

## 2021-01-01 ENCOUNTER — HOSPITAL ENCOUNTER (INPATIENT)
Facility: HOSPITAL | Age: 86
LOS: 2 days | End: 2021-04-10
Attending: EMERGENCY MEDICINE | Admitting: INTERNAL MEDICINE

## 2021-01-01 ENCOUNTER — APPOINTMENT (OUTPATIENT)
Dept: GENERAL RADIOLOGY | Facility: HOSPITAL | Age: 86
End: 2021-01-01

## 2021-01-01 VITALS
DIASTOLIC BLOOD PRESSURE: 70 MMHG | HEIGHT: 68 IN | BODY MASS INDEX: 27.23 KG/M2 | SYSTOLIC BLOOD PRESSURE: 125 MMHG | TEMPERATURE: 97.4 F | RESPIRATION RATE: 18 BRPM | HEART RATE: 76 BPM | WEIGHT: 179.68 LBS | OXYGEN SATURATION: 98 %

## 2021-01-01 VITALS — DIASTOLIC BLOOD PRESSURE: 56 MMHG | SYSTOLIC BLOOD PRESSURE: 80 MMHG | TEMPERATURE: 99.7 F | OXYGEN SATURATION: 88 %

## 2021-01-01 DIAGNOSIS — T83.511A URINARY TRACT INFECTION ASSOCIATED WITH INDWELLING URETHRAL CATHETER, INITIAL ENCOUNTER (HCC): ICD-10-CM

## 2021-01-01 DIAGNOSIS — D64.9 ACUTE ANEMIA: ICD-10-CM

## 2021-01-01 DIAGNOSIS — E87.1 HYPONATREMIA: Primary | ICD-10-CM

## 2021-01-01 DIAGNOSIS — S70.12XA TRAUMATIC ECCHYMOSIS OF LEFT THIGH, INITIAL ENCOUNTER: ICD-10-CM

## 2021-01-01 DIAGNOSIS — N39.0 URINARY TRACT INFECTION ASSOCIATED WITH INDWELLING URETHRAL CATHETER, INITIAL ENCOUNTER (HCC): ICD-10-CM

## 2021-01-01 LAB
ABO GROUP BLD: NORMAL
ALBUMIN SERPL ELPH-MCNC: 3.6 G/DL (ref 2.9–4.4)
ALBUMIN SERPL-MCNC: 3.1 G/DL (ref 3.5–5.2)
ALBUMIN SERPL-MCNC: 3.5 G/DL (ref 3.5–5.2)
ALBUMIN SERPL-MCNC: 3.6 G/DL (ref 3.5–5.2)
ALBUMIN SERPL-MCNC: 4 G/DL (ref 3.5–5.2)
ALBUMIN SERPL-MCNC: 4 G/DL (ref 3.5–5.2)
ALBUMIN SERPL-MCNC: 4.1 G/DL (ref 3.5–5.2)
ALBUMIN/GLOB SERPL: 1.1 {RATIO} (ref 0.7–1.7)
ALBUMIN/GLOB SERPL: 1.3 G/DL
ALBUMIN/GLOB SERPL: 1.3 G/DL
ALBUMIN/GLOB SERPL: 1.5 G/DL
ALBUMIN/GLOB SERPL: 1.5 G/DL
ALBUMIN/GLOB SERPL: 1.7 G/DL
ALP SERPL-CCNC: 66 U/L (ref 39–117)
ALP SERPL-CCNC: 70 U/L (ref 39–117)
ALP SERPL-CCNC: 87 U/L (ref 39–117)
ALPHA1 GLOB SERPL ELPH-MCNC: 0.3 G/DL (ref 0–0.4)
ALPHA2 GLOB SERPL ELPH-MCNC: 0.9 G/DL (ref 0.4–1)
ALT SERPL W P-5'-P-CCNC: 17 U/L (ref 1–33)
ALT SERPL W P-5'-P-CCNC: 20 U/L (ref 1–33)
ALT SERPL W P-5'-P-CCNC: 24 U/L (ref 1–33)
ALT SERPL W P-5'-P-CCNC: 26 U/L (ref 1–33)
ALT SERPL W P-5'-P-CCNC: 9 U/L (ref 1–33)
ANION GAP SERPL CALCULATED.3IONS-SCNC: 10.4 MMOL/L (ref 5–15)
ANION GAP SERPL CALCULATED.3IONS-SCNC: 11.1 MMOL/L (ref 5–15)
ANION GAP SERPL CALCULATED.3IONS-SCNC: 11.6 MMOL/L (ref 5–15)
ANION GAP SERPL CALCULATED.3IONS-SCNC: 12.2 MMOL/L (ref 5–15)
ANION GAP SERPL CALCULATED.3IONS-SCNC: 12.7 MMOL/L (ref 5–15)
ANION GAP SERPL CALCULATED.3IONS-SCNC: 13.2 MMOL/L (ref 5–15)
ANION GAP SERPL CALCULATED.3IONS-SCNC: 13.6 MMOL/L (ref 5–15)
ANION GAP SERPL CALCULATED.3IONS-SCNC: 6.3 MMOL/L (ref 5–15)
ANION GAP SERPL CALCULATED.3IONS-SCNC: 8.2 MMOL/L (ref 5–15)
ANION GAP SERPL CALCULATED.3IONS-SCNC: 9 MMOL/L (ref 5–15)
APTT PPP: 52.3 SECONDS (ref 22.7–35.4)
AST SERPL-CCNC: 21 U/L (ref 1–32)
AST SERPL-CCNC: 23 U/L (ref 1–32)
AST SERPL-CCNC: 28 U/L (ref 1–32)
AST SERPL-CCNC: 31 U/L (ref 1–32)
AST SERPL-CCNC: 47 U/L (ref 1–32)
B PARAPERT DNA SPEC QL NAA+PROBE: NOT DETECTED
B PERT DNA SPEC QL NAA+PROBE: NOT DETECTED
B-GLOBULIN SERPL ELPH-MCNC: 0.8 G/DL (ref 0.7–1.3)
BACTERIA SPEC AEROBE CULT: ABNORMAL
BACTERIA UR QL AUTO: ABNORMAL /HPF
BASOPHILS # BLD AUTO: 0.01 10*3/MM3 (ref 0–0.2)
BASOPHILS # BLD AUTO: 0.01 10*3/MM3 (ref 0–0.2)
BASOPHILS # BLD AUTO: 0.02 10*3/MM3 (ref 0–0.2)
BASOPHILS # BLD AUTO: 0.03 10*3/MM3 (ref 0–0.2)
BASOPHILS NFR BLD AUTO: 0.1 % (ref 0–1.5)
BASOPHILS NFR BLD AUTO: 0.1 % (ref 0–1.5)
BASOPHILS NFR BLD AUTO: 0.2 % (ref 0–1.5)
BASOPHILS NFR BLD AUTO: 0.4 % (ref 0–1.5)
BH BB BLOOD EXPIRATION DATE: NORMAL
BH BB BLOOD EXPIRATION DATE: NORMAL
BH BB BLOOD TYPE BARCODE: 5100
BH BB BLOOD TYPE BARCODE: 5100
BH BB DISPENSE STATUS: NORMAL
BH BB DISPENSE STATUS: NORMAL
BH BB PRODUCT CODE: NORMAL
BH BB PRODUCT CODE: NORMAL
BH BB UNIT NUMBER: NORMAL
BH BB UNIT NUMBER: NORMAL
BILIRUB SERPL-MCNC: 0.6 MG/DL (ref 0–1.2)
BILIRUB SERPL-MCNC: 0.6 MG/DL (ref 0–1.2)
BILIRUB SERPL-MCNC: 0.7 MG/DL (ref 0–1.2)
BILIRUB SERPL-MCNC: 1 MG/DL (ref 0–1.2)
BILIRUB SERPL-MCNC: 2.4 MG/DL (ref 0–1.2)
BILIRUB UR QL STRIP: ABNORMAL
BLD GP AB SCN SERPL QL: NEGATIVE
BUN SERPL-MCNC: 14 MG/DL (ref 8–23)
BUN SERPL-MCNC: 16 MG/DL (ref 8–23)
BUN SERPL-MCNC: 18 MG/DL (ref 8–23)
BUN SERPL-MCNC: 20 MG/DL (ref 8–23)
BUN SERPL-MCNC: 20 MG/DL (ref 8–23)
BUN SERPL-MCNC: 21 MG/DL (ref 8–23)
BUN SERPL-MCNC: 23 MG/DL (ref 8–23)
BUN SERPL-MCNC: 24 MG/DL (ref 8–23)
BUN SERPL-MCNC: 9 MG/DL (ref 8–23)
BUN/CREAT SERPL: 18 (ref 7–25)
BUN/CREAT SERPL: 20.9 (ref 7–25)
BUN/CREAT SERPL: 25 (ref 7–25)
BUN/CREAT SERPL: 25.3 (ref 7–25)
BUN/CREAT SERPL: 25.3 (ref 7–25)
BUN/CREAT SERPL: 28 (ref 7–25)
BUN/CREAT SERPL: 28.1 (ref 7–25)
BUN/CREAT SERPL: 29.4 (ref 7–25)
BUN/CREAT SERPL: 32.4 (ref 7–25)
C PNEUM DNA NPH QL NAA+NON-PROBE: NOT DETECTED
CALCIUM SPEC-SCNC: 8.1 MG/DL (ref 8.6–10.5)
CALCIUM SPEC-SCNC: 8.6 MG/DL (ref 8.6–10.5)
CALCIUM SPEC-SCNC: 8.7 MG/DL (ref 8.6–10.5)
CALCIUM SPEC-SCNC: 8.7 MG/DL (ref 8.6–10.5)
CALCIUM SPEC-SCNC: 8.8 MG/DL (ref 8.6–10.5)
CALCIUM SPEC-SCNC: 8.8 MG/DL (ref 8.6–10.5)
CALCIUM SPEC-SCNC: 8.9 MG/DL (ref 8.6–10.5)
CALCIUM SPEC-SCNC: 9.4 MG/DL (ref 8.6–10.5)
CALCIUM SPEC-SCNC: 9.5 MG/DL (ref 8.6–10.5)
CHLORIDE SERPL-SCNC: 79 MMOL/L (ref 98–107)
CHLORIDE SERPL-SCNC: 80 MMOL/L (ref 98–107)
CHLORIDE SERPL-SCNC: 80 MMOL/L (ref 98–107)
CHLORIDE SERPL-SCNC: 81 MMOL/L (ref 98–107)
CHLORIDE SERPL-SCNC: 82 MMOL/L (ref 98–107)
CHLORIDE SERPL-SCNC: 85 MMOL/L (ref 98–107)
CHLORIDE SERPL-SCNC: 86 MMOL/L (ref 98–107)
CHLORIDE SERPL-SCNC: 86 MMOL/L (ref 98–107)
CHLORIDE SERPL-SCNC: 88 MMOL/L (ref 98–107)
CHLORIDE SERPL-SCNC: 93 MMOL/L (ref 98–107)
CLARITY UR: ABNORMAL
CO2 SERPL-SCNC: 23.3 MMOL/L (ref 22–29)
CO2 SERPL-SCNC: 24.8 MMOL/L (ref 22–29)
CO2 SERPL-SCNC: 24.8 MMOL/L (ref 22–29)
CO2 SERPL-SCNC: 25.4 MMOL/L (ref 22–29)
CO2 SERPL-SCNC: 25.6 MMOL/L (ref 22–29)
CO2 SERPL-SCNC: 25.9 MMOL/L (ref 22–29)
CO2 SERPL-SCNC: 26.4 MMOL/L (ref 22–29)
CO2 SERPL-SCNC: 28.7 MMOL/L (ref 22–29)
CO2 SERPL-SCNC: 28.8 MMOL/L (ref 22–29)
CO2 SERPL-SCNC: 29 MMOL/L (ref 22–29)
COLOR UR: YELLOW
CREAT SERPL-MCNC: 0.5 MG/DL (ref 0.57–1)
CREAT SERPL-MCNC: 0.64 MG/DL (ref 0.57–1)
CREAT SERPL-MCNC: 0.64 MG/DL (ref 0.57–1)
CREAT SERPL-MCNC: 0.67 MG/DL (ref 0.57–1)
CREAT SERPL-MCNC: 0.68 MG/DL (ref 0.57–1)
CREAT SERPL-MCNC: 0.74 MG/DL (ref 0.57–1)
CREAT SERPL-MCNC: 0.79 MG/DL (ref 0.57–1)
CREAT SERPL-MCNC: 0.82 MG/DL (ref 0.57–1)
CREAT SERPL-MCNC: 0.83 MG/DL (ref 0.57–1)
CROSSMATCH INTERPRETATION: NORMAL
CROSSMATCH INTERPRETATION: NORMAL
DEPRECATED RDW RBC AUTO: 41.6 FL (ref 37–54)
DEPRECATED RDW RBC AUTO: 44.2 FL (ref 37–54)
DEPRECATED RDW RBC AUTO: 44.8 FL (ref 37–54)
DEPRECATED RDW RBC AUTO: 44.9 FL (ref 37–54)
DEPRECATED RDW RBC AUTO: 45.3 FL (ref 37–54)
DEPRECATED RDW RBC AUTO: 45.4 FL (ref 37–54)
DEPRECATED RDW RBC AUTO: 45.4 FL (ref 37–54)
EOSINOPHIL # BLD AUTO: 0 10*3/MM3 (ref 0–0.4)
EOSINOPHIL # BLD AUTO: 0 10*3/MM3 (ref 0–0.4)
EOSINOPHIL # BLD AUTO: 0.13 10*3/MM3 (ref 0–0.4)
EOSINOPHIL # BLD AUTO: 0.28 10*3/MM3 (ref 0–0.4)
EOSINOPHIL NFR BLD AUTO: 0 % (ref 0.3–6.2)
EOSINOPHIL NFR BLD AUTO: 0 % (ref 0.3–6.2)
EOSINOPHIL NFR BLD AUTO: 0.9 % (ref 0.3–6.2)
EOSINOPHIL NFR BLD AUTO: 4.1 % (ref 0.3–6.2)
ERYTHROCYTE [DISTWIDTH] IN BLOOD BY AUTOMATED COUNT: 14 % (ref 12.3–15.4)
ERYTHROCYTE [DISTWIDTH] IN BLOOD BY AUTOMATED COUNT: 14.9 % (ref 12.3–15.4)
ERYTHROCYTE [DISTWIDTH] IN BLOOD BY AUTOMATED COUNT: 15 % (ref 12.3–15.4)
ERYTHROCYTE [DISTWIDTH] IN BLOOD BY AUTOMATED COUNT: 15.3 % (ref 12.3–15.4)
ERYTHROCYTE [DISTWIDTH] IN BLOOD BY AUTOMATED COUNT: 15.4 % (ref 12.3–15.4)
ERYTHROCYTE [DISTWIDTH] IN BLOOD BY AUTOMATED COUNT: 15.5 % (ref 12.3–15.4)
ERYTHROCYTE [DISTWIDTH] IN BLOOD BY AUTOMATED COUNT: 15.6 % (ref 12.3–15.4)
FLUAV SUBTYP SPEC NAA+PROBE: NOT DETECTED
FLUBV RNA ISLT QL NAA+PROBE: NOT DETECTED
GAMMA GLOB SERPL ELPH-MCNC: 1.2 G/DL (ref 0.4–1.8)
GFR SERPL CREATININE-BSD FRML MDRD: 116 ML/MIN/1.73
GFR SERPL CREATININE-BSD FRML MDRD: 65 ML/MIN/1.73
GFR SERPL CREATININE-BSD FRML MDRD: 66 ML/MIN/1.73
GFR SERPL CREATININE-BSD FRML MDRD: 69 ML/MIN/1.73
GFR SERPL CREATININE-BSD FRML MDRD: 74 ML/MIN/1.73
GFR SERPL CREATININE-BSD FRML MDRD: 81 ML/MIN/1.73
GFR SERPL CREATININE-BSD FRML MDRD: 83 ML/MIN/1.73
GFR SERPL CREATININE-BSD FRML MDRD: 87 ML/MIN/1.73
GFR SERPL CREATININE-BSD FRML MDRD: 87 ML/MIN/1.73
GLOBULIN SER CALC-MCNC: 3.2 G/DL (ref 2.2–3.9)
GLOBULIN UR ELPH-MCNC: 2.3 GM/DL
GLOBULIN UR ELPH-MCNC: 2.8 GM/DL
GLOBULIN UR ELPH-MCNC: 3 GM/DL
GLUCOSE SERPL-MCNC: 101 MG/DL (ref 65–99)
GLUCOSE SERPL-MCNC: 141 MG/DL (ref 65–99)
GLUCOSE SERPL-MCNC: 144 MG/DL (ref 65–99)
GLUCOSE SERPL-MCNC: 156 MG/DL (ref 65–99)
GLUCOSE SERPL-MCNC: 192 MG/DL (ref 65–99)
GLUCOSE SERPL-MCNC: 84 MG/DL (ref 65–99)
GLUCOSE SERPL-MCNC: 89 MG/DL (ref 65–99)
GLUCOSE SERPL-MCNC: 92 MG/DL (ref 65–99)
GLUCOSE SERPL-MCNC: 96 MG/DL (ref 65–99)
GLUCOSE UR STRIP-MCNC: NEGATIVE MG/DL
HADV DNA SPEC NAA+PROBE: NOT DETECTED
HBA1C MFR BLD: 5.6 % (ref 4.8–5.6)
HCOV 229E RNA SPEC QL NAA+PROBE: NOT DETECTED
HCOV HKU1 RNA SPEC QL NAA+PROBE: NOT DETECTED
HCOV NL63 RNA SPEC QL NAA+PROBE: NOT DETECTED
HCOV OC43 RNA SPEC QL NAA+PROBE: NOT DETECTED
HCT VFR BLD AUTO: 19.4 % (ref 34–46.6)
HCT VFR BLD AUTO: 34.6 % (ref 34–46.6)
HCT VFR BLD AUTO: 34.7 % (ref 34–46.6)
HCT VFR BLD AUTO: 35 % (ref 34–46.6)
HCT VFR BLD AUTO: 35.1 % (ref 34–46.6)
HCT VFR BLD AUTO: 36.7 % (ref 34–46.6)
HCT VFR BLD AUTO: 37.9 % (ref 34–46.6)
HGB BLD-MCNC: 11.7 G/DL (ref 12–15.9)
HGB BLD-MCNC: 11.9 G/DL (ref 12–15.9)
HGB BLD-MCNC: 11.9 G/DL (ref 12–15.9)
HGB BLD-MCNC: 12 G/DL (ref 12–15.9)
HGB BLD-MCNC: 12.3 G/DL (ref 12–15.9)
HGB BLD-MCNC: 12.6 G/DL (ref 12–15.9)
HGB BLD-MCNC: 6.5 G/DL (ref 12–15.9)
HGB UR QL STRIP.AUTO: ABNORMAL
HMPV RNA NPH QL NAA+NON-PROBE: NOT DETECTED
HPIV1 RNA SPEC QL NAA+PROBE: NOT DETECTED
HPIV2 RNA SPEC QL NAA+PROBE: NOT DETECTED
HPIV3 RNA NPH QL NAA+PROBE: NOT DETECTED
HPIV4 P GENE NPH QL NAA+PROBE: NOT DETECTED
HYALINE CASTS UR QL AUTO: ABNORMAL /LPF
IGA SERPL-MCNC: 105 MG/DL (ref 64–422)
IGG SERPL-MCNC: 983 MG/DL (ref 586–1602)
IGM SERPL-MCNC: 147 MG/DL (ref 26–217)
IMM GRANULOCYTES # BLD AUTO: 0.04 10*3/MM3 (ref 0–0.05)
IMM GRANULOCYTES # BLD AUTO: 0.04 10*3/MM3 (ref 0–0.05)
IMM GRANULOCYTES # BLD AUTO: 0.06 10*3/MM3 (ref 0–0.05)
IMM GRANULOCYTES # BLD AUTO: 0.13 10*3/MM3 (ref 0–0.05)
IMM GRANULOCYTES NFR BLD AUTO: 0.4 % (ref 0–0.5)
IMM GRANULOCYTES NFR BLD AUTO: 0.5 % (ref 0–0.5)
IMM GRANULOCYTES NFR BLD AUTO: 0.6 % (ref 0–0.5)
IMM GRANULOCYTES NFR BLD AUTO: 0.9 % (ref 0–0.5)
INR PPP: 1.46 (ref 0.9–1.1)
IRON 24H UR-MRATE: 45 MCG/DL (ref 37–145)
IRON SATN MFR SERPL: 14 % (ref 20–50)
KETONES UR QL STRIP: NEGATIVE
LABORATORY COMMENT REPORT: NORMAL
LEUKOCYTE ESTERASE UR QL STRIP.AUTO: ABNORMAL
LYMPHOCYTES # BLD AUTO: 0.99 10*3/MM3 (ref 0.7–3.1)
LYMPHOCYTES # BLD AUTO: 1.3 10*3/MM3 (ref 0.7–3.1)
LYMPHOCYTES # BLD AUTO: 1.32 10*3/MM3 (ref 0.7–3.1)
LYMPHOCYTES # BLD AUTO: 2.14 10*3/MM3 (ref 0.7–3.1)
LYMPHOCYTES NFR BLD AUTO: 10.7 % (ref 19.6–45.3)
LYMPHOCYTES NFR BLD AUTO: 14.4 % (ref 19.6–45.3)
LYMPHOCYTES NFR BLD AUTO: 19.3 % (ref 19.6–45.3)
LYMPHOCYTES NFR BLD AUTO: 9.8 % (ref 19.6–45.3)
M PNEUMO IGG SER IA-ACNC: NOT DETECTED
M PROTEIN SERPL ELPH-MCNC: NORMAL G/DL
MAGNESIUM SERPL-MCNC: 1.8 MG/DL (ref 1.6–2.4)
MAGNESIUM SERPL-MCNC: 1.8 MG/DL (ref 1.6–2.4)
MCH RBC QN AUTO: 27.3 PG (ref 26.6–33)
MCH RBC QN AUTO: 27.4 PG (ref 26.6–33)
MCH RBC QN AUTO: 27.8 PG (ref 26.6–33)
MCH RBC QN AUTO: 27.8 PG (ref 26.6–33)
MCH RBC QN AUTO: 27.9 PG (ref 26.6–33)
MCH RBC QN AUTO: 28 PG (ref 26.6–33)
MCH RBC QN AUTO: 28.4 PG (ref 26.6–33)
MCHC RBC AUTO-ENTMCNC: 33.2 G/DL (ref 31.5–35.7)
MCHC RBC AUTO-ENTMCNC: 33.4 G/DL (ref 31.5–35.7)
MCHC RBC AUTO-ENTMCNC: 33.5 G/DL (ref 31.5–35.7)
MCHC RBC AUTO-ENTMCNC: 33.5 G/DL (ref 31.5–35.7)
MCHC RBC AUTO-ENTMCNC: 33.9 G/DL (ref 31.5–35.7)
MCHC RBC AUTO-ENTMCNC: 34.4 G/DL (ref 31.5–35.7)
MCHC RBC AUTO-ENTMCNC: 34.6 G/DL (ref 31.5–35.7)
MCV RBC AUTO: 81.4 FL (ref 79–97)
MCV RBC AUTO: 81.4 FL (ref 79–97)
MCV RBC AUTO: 82 FL (ref 79–97)
MCV RBC AUTO: 82.2 FL (ref 79–97)
MCV RBC AUTO: 82.2 FL (ref 79–97)
MCV RBC AUTO: 82.9 FL (ref 79–97)
MCV RBC AUTO: 83.5 FL (ref 79–97)
MONOCYTES # BLD AUTO: 0.18 10*3/MM3 (ref 0.1–0.9)
MONOCYTES # BLD AUTO: 0.56 10*3/MM3 (ref 0.1–0.9)
MONOCYTES # BLD AUTO: 0.7 10*3/MM3 (ref 0.1–0.9)
MONOCYTES # BLD AUTO: 1.22 10*3/MM3 (ref 0.1–0.9)
MONOCYTES NFR BLD AUTO: 1.8 % (ref 5–12)
MONOCYTES NFR BLD AUTO: 10.2 % (ref 5–12)
MONOCYTES NFR BLD AUTO: 4.6 % (ref 5–12)
MONOCYTES NFR BLD AUTO: 8.2 % (ref 5–12)
NEUTROPHILS NFR BLD AUTO: 10.18 10*3/MM3 (ref 1.7–7)
NEUTROPHILS NFR BLD AUTO: 11.18 10*3/MM3 (ref 1.7–7)
NEUTROPHILS NFR BLD AUTO: 4.46 10*3/MM3 (ref 1.7–7)
NEUTROPHILS NFR BLD AUTO: 65.4 % (ref 42.7–76)
NEUTROPHILS NFR BLD AUTO: 75.5 % (ref 42.7–76)
NEUTROPHILS NFR BLD AUTO: 8.91 10*3/MM3 (ref 1.7–7)
NEUTROPHILS NFR BLD AUTO: 84 % (ref 42.7–76)
NEUTROPHILS NFR BLD AUTO: 87.9 % (ref 42.7–76)
NITRITE UR QL STRIP: POSITIVE
NRBC BLD AUTO-RTO: 0 /100 WBC (ref 0–0.2)
NT-PROBNP SERPL-MCNC: 2662 PG/ML (ref 0–1800)
OSMOLALITY SERPL: 242 MOSM/KG (ref 280–301)
OSMOLALITY SERPL: 262 MOSM/KG (ref 280–301)
OSMOLALITY SERPL: 270 MOSM/KG (ref 280–301)
OSMOLALITY UR: 333 MOSM/KG
PH UR STRIP.AUTO: 6 [PH] (ref 5–8)
PHOSPHATE SERPL-MCNC: 3 MG/DL (ref 2.5–4.5)
PHOSPHATE SERPL-MCNC: 3.1 MG/DL (ref 2.5–4.5)
PLATELET # BLD AUTO: 334 10*3/MM3 (ref 140–450)
PLATELET # BLD AUTO: 339 10*3/MM3 (ref 140–450)
PLATELET # BLD AUTO: 348 10*3/MM3 (ref 140–450)
PLATELET # BLD AUTO: 359 10*3/MM3 (ref 140–450)
PLATELET # BLD AUTO: 364 10*3/MM3 (ref 140–450)
PLATELET # BLD AUTO: 386 10*3/MM3 (ref 140–450)
PLATELET # BLD AUTO: 404 10*3/MM3 (ref 140–450)
PMV BLD AUTO: 8.5 FL (ref 6–12)
PMV BLD AUTO: 8.9 FL (ref 6–12)
PMV BLD AUTO: 9 FL (ref 6–12)
PMV BLD AUTO: 9 FL (ref 6–12)
PMV BLD AUTO: 9.2 FL (ref 6–12)
PMV BLD AUTO: 9.4 FL (ref 6–12)
PMV BLD AUTO: 9.5 FL (ref 6–12)
POTASSIUM SERPL-SCNC: 3.3 MMOL/L (ref 3.5–5.2)
POTASSIUM SERPL-SCNC: 3.6 MMOL/L (ref 3.5–5.2)
POTASSIUM SERPL-SCNC: 3.8 MMOL/L (ref 3.5–5.2)
POTASSIUM SERPL-SCNC: 3.9 MMOL/L (ref 3.5–5.2)
POTASSIUM SERPL-SCNC: 4 MMOL/L (ref 3.5–5.2)
POTASSIUM SERPL-SCNC: 4 MMOL/L (ref 3.5–5.2)
POTASSIUM SERPL-SCNC: 4.2 MMOL/L (ref 3.5–5.2)
POTASSIUM SERPL-SCNC: 4.2 MMOL/L (ref 3.5–5.2)
POTASSIUM SERPL-SCNC: 4.7 MMOL/L (ref 3.5–5.2)
POTASSIUM SERPL-SCNC: 4.9 MMOL/L (ref 3.5–5.2)
PROCALCITONIN SERPL-MCNC: 0.06 NG/ML (ref 0–0.25)
PROCALCITONIN SERPL-MCNC: 0.08 NG/ML (ref 0–0.25)
PROT PATTERN SERPL ELPH-IMP: NORMAL
PROT PATTERN SERPL IFE-IMP: NORMAL
PROT SERPL-MCNC: 5.4 G/DL (ref 6–8.5)
PROT SERPL-MCNC: 5.8 G/DL (ref 6–8.5)
PROT SERPL-MCNC: 6.3 G/DL (ref 6–8.5)
PROT SERPL-MCNC: 6.8 G/DL (ref 6–8.5)
PROT SERPL-MCNC: 6.9 G/DL (ref 6–8.5)
PROT SERPL-MCNC: 7 G/DL (ref 6–8.5)
PROT UR QL STRIP: ABNORMAL
PROTHROMBIN TIME: 17.5 SECONDS (ref 11.7–14.2)
QT INTERVAL: 487 MS
RBC # BLD AUTO: 2.34 10*6/MM3 (ref 3.77–5.28)
RBC # BLD AUTO: 4.22 10*6/MM3 (ref 3.77–5.28)
RBC # BLD AUTO: 4.25 10*6/MM3 (ref 3.77–5.28)
RBC # BLD AUTO: 4.27 10*6/MM3 (ref 3.77–5.28)
RBC # BLD AUTO: 4.27 10*6/MM3 (ref 3.77–5.28)
RBC # BLD AUTO: 4.51 10*6/MM3 (ref 3.77–5.28)
RBC # BLD AUTO: 4.54 10*6/MM3 (ref 3.77–5.28)
RBC # UR: ABNORMAL /HPF
REF LAB TEST METHOD: ABNORMAL
RH BLD: POSITIVE
RHINOVIRUS RNA SPEC NAA+PROBE: NOT DETECTED
RSV RNA NPH QL NAA+NON-PROBE: NOT DETECTED
SARS-COV-2 ORF1AB RESP QL NAA+PROBE: NOT DETECTED
SARS-COV-2 RNA NPH QL NAA+NON-PROBE: NOT DETECTED
SODIUM SERPL-SCNC: 115 MMOL/L (ref 136–145)
SODIUM SERPL-SCNC: 116 MMOL/L (ref 136–145)
SODIUM SERPL-SCNC: 116 MMOL/L (ref 136–145)
SODIUM SERPL-SCNC: 117 MMOL/L (ref 136–145)
SODIUM SERPL-SCNC: 118 MMOL/L (ref 136–145)
SODIUM SERPL-SCNC: 120 MMOL/L (ref 136–145)
SODIUM SERPL-SCNC: 122 MMOL/L (ref 136–145)
SODIUM SERPL-SCNC: 123 MMOL/L (ref 136–145)
SODIUM SERPL-SCNC: 123 MMOL/L (ref 136–145)
SODIUM SERPL-SCNC: 128 MMOL/L (ref 136–145)
SODIUM SERPL-SCNC: 129 MMOL/L (ref 136–145)
SODIUM UR-SCNC: 61 MMOL/L
SP GR UR STRIP: 1.02 (ref 1–1.03)
SQUAMOUS #/AREA URNS HPF: ABNORMAL /HPF
T&S EXPIRATION DATE: NORMAL
TIBC SERPL-MCNC: 322 MCG/DL (ref 298–536)
TRANSFERRIN SERPL-MCNC: 216 MG/DL (ref 200–360)
TSH SERPL DL<=0.05 MIU/L-ACNC: 0.2 UIU/ML (ref 0.27–4.2)
TSH SERPL DL<=0.05 MIU/L-ACNC: 0.47 UIU/ML (ref 0.27–4.2)
UNIT  ABO: NORMAL
UNIT  ABO: NORMAL
UNIT  RH: NORMAL
UNIT  RH: NORMAL
URATE SERPL-MCNC: 3.4 MG/DL (ref 2.4–5.7)
UROBILINOGEN UR QL STRIP: ABNORMAL
WBC # BLD AUTO: 10.13 10*3/MM3 (ref 3.4–10.8)
WBC # BLD AUTO: 12.12 10*3/MM3 (ref 3.4–10.8)
WBC # BLD AUTO: 14.81 10*3/MM3 (ref 3.4–10.8)
WBC # BLD AUTO: 15.79 10*3/MM3 (ref 3.4–10.8)
WBC # BLD AUTO: 15.89 10*3/MM3 (ref 3.4–10.8)
WBC # BLD AUTO: 17.42 10*3/MM3 (ref 3.4–10.8)
WBC # BLD AUTO: 6.83 10*3/MM3 (ref 3.4–10.8)
WBC UR QL AUTO: ABNORMAL /HPF

## 2021-01-01 PROCEDURE — 25010000002 LORAZEPAM PER 2 MG: Performed by: INTERNAL MEDICINE

## 2021-01-01 PROCEDURE — 87186 SC STD MICRODIL/AGAR DIL: CPT | Performed by: EMERGENCY MEDICINE

## 2021-01-01 PROCEDURE — 94799 UNLISTED PULMONARY SVC/PX: CPT

## 2021-01-01 PROCEDURE — 97110 THERAPEUTIC EXERCISES: CPT | Performed by: PHYSICAL THERAPIST

## 2021-01-01 PROCEDURE — 94669 MECHANICAL CHEST WALL OSCILL: CPT

## 2021-01-01 PROCEDURE — 36415 COLL VENOUS BLD VENIPUNCTURE: CPT | Performed by: NURSE PRACTITIONER

## 2021-01-01 PROCEDURE — 82784 ASSAY IGA/IGD/IGG/IGM EACH: CPT | Performed by: INTERNAL MEDICINE

## 2021-01-01 PROCEDURE — 25010000002 HYDROMORPHONE 1 MG/ML SOLUTION: Performed by: INTERNAL MEDICINE

## 2021-01-01 PROCEDURE — 80069 RENAL FUNCTION PANEL: CPT | Performed by: INTERNAL MEDICINE

## 2021-01-01 PROCEDURE — 80053 COMPREHEN METABOLIC PANEL: CPT | Performed by: EMERGENCY MEDICINE

## 2021-01-01 PROCEDURE — 92526 ORAL FUNCTION THERAPY: CPT | Performed by: SPEECH-LANGUAGE PATHOLOGIST

## 2021-01-01 PROCEDURE — 85027 COMPLETE CBC AUTOMATED: CPT | Performed by: HOSPITALIST

## 2021-01-01 PROCEDURE — 80048 BASIC METABOLIC PNL TOTAL CA: CPT | Performed by: INTERNAL MEDICINE

## 2021-01-01 PROCEDURE — 85025 COMPLETE CBC W/AUTO DIFF WBC: CPT | Performed by: EMERGENCY MEDICINE

## 2021-01-01 PROCEDURE — 83735 ASSAY OF MAGNESIUM: CPT | Performed by: INTERNAL MEDICINE

## 2021-01-01 PROCEDURE — 84443 ASSAY THYROID STIM HORMONE: CPT | Performed by: INTERNAL MEDICINE

## 2021-01-01 PROCEDURE — 63710000001 PREDNISONE PER 1 MG: Performed by: INTERNAL MEDICINE

## 2021-01-01 PROCEDURE — 86900 BLOOD TYPING SEROLOGIC ABO: CPT

## 2021-01-01 PROCEDURE — 25010000002 MORPHINE PER 10 MG: Performed by: INTERNAL MEDICINE

## 2021-01-01 PROCEDURE — 94640 AIRWAY INHALATION TREATMENT: CPT

## 2021-01-01 PROCEDURE — 86923 COMPATIBILITY TEST ELECTRIC: CPT

## 2021-01-01 PROCEDURE — 85610 PROTHROMBIN TIME: CPT | Performed by: EMERGENCY MEDICINE

## 2021-01-01 PROCEDURE — 80053 COMPREHEN METABOLIC PANEL: CPT | Performed by: INTERNAL MEDICINE

## 2021-01-01 PROCEDURE — 84145 PROCALCITONIN (PCT): CPT | Performed by: INTERNAL MEDICINE

## 2021-01-01 PROCEDURE — 86901 BLOOD TYPING SEROLOGIC RH(D): CPT | Performed by: EMERGENCY MEDICINE

## 2021-01-01 PROCEDURE — 83540 ASSAY OF IRON: CPT | Performed by: NURSE PRACTITIONER

## 2021-01-01 PROCEDURE — 86334 IMMUNOFIX E-PHORESIS SERUM: CPT | Performed by: INTERNAL MEDICINE

## 2021-01-01 PROCEDURE — 83930 ASSAY OF BLOOD OSMOLALITY: CPT | Performed by: HOSPITALIST

## 2021-01-01 PROCEDURE — P9016 RBC LEUKOCYTES REDUCED: HCPCS

## 2021-01-01 PROCEDURE — 85025 COMPLETE CBC W/AUTO DIFF WBC: CPT | Performed by: HOSPITALIST

## 2021-01-01 PROCEDURE — 94667 MNPJ CHEST WALL 1ST: CPT

## 2021-01-01 PROCEDURE — 92610 EVALUATE SWALLOWING FUNCTION: CPT | Performed by: SPEECH-LANGUAGE PATHOLOGIST

## 2021-01-01 PROCEDURE — 97530 THERAPEUTIC ACTIVITIES: CPT

## 2021-01-01 PROCEDURE — 36430 TRANSFUSION BLD/BLD COMPNT: CPT

## 2021-01-01 PROCEDURE — 80051 ELECTROLYTE PANEL: CPT | Performed by: INTERNAL MEDICINE

## 2021-01-01 PROCEDURE — 87088 URINE BACTERIA CULTURE: CPT | Performed by: EMERGENCY MEDICINE

## 2021-01-01 PROCEDURE — 85027 COMPLETE CBC AUTOMATED: CPT | Performed by: NURSE PRACTITIONER

## 2021-01-01 PROCEDURE — 83036 HEMOGLOBIN GLYCOSYLATED A1C: CPT | Performed by: HOSPITALIST

## 2021-01-01 PROCEDURE — 86850 RBC ANTIBODY SCREEN: CPT | Performed by: EMERGENCY MEDICINE

## 2021-01-01 PROCEDURE — 25010000002 METHYLPREDNISOLONE PER 40 MG: Performed by: INTERNAL MEDICINE

## 2021-01-01 PROCEDURE — 92526 ORAL FUNCTION THERAPY: CPT

## 2021-01-01 PROCEDURE — 84443 ASSAY THYROID STIM HORMONE: CPT | Performed by: NURSE PRACTITIONER

## 2021-01-01 PROCEDURE — 25010000002 CEFTRIAXONE PER 250 MG: Performed by: EMERGENCY MEDICINE

## 2021-01-01 PROCEDURE — 84100 ASSAY OF PHOSPHORUS: CPT | Performed by: INTERNAL MEDICINE

## 2021-01-01 PROCEDURE — 63710000001 PREDNISONE PER 5 MG: Performed by: INTERNAL MEDICINE

## 2021-01-01 PROCEDURE — 81001 URINALYSIS AUTO W/SCOPE: CPT | Performed by: EMERGENCY MEDICINE

## 2021-01-01 PROCEDURE — 83930 ASSAY OF BLOOD OSMOLALITY: CPT | Performed by: NURSE PRACTITIONER

## 2021-01-01 PROCEDURE — 84550 ASSAY OF BLOOD/URIC ACID: CPT | Performed by: INTERNAL MEDICINE

## 2021-01-01 PROCEDURE — 83930 ASSAY OF BLOOD OSMOLALITY: CPT | Performed by: INTERNAL MEDICINE

## 2021-01-01 PROCEDURE — 83735 ASSAY OF MAGNESIUM: CPT | Performed by: HOSPITALIST

## 2021-01-01 PROCEDURE — 84466 ASSAY OF TRANSFERRIN: CPT | Performed by: NURSE PRACTITIONER

## 2021-01-01 PROCEDURE — 86900 BLOOD TYPING SEROLOGIC ABO: CPT | Performed by: EMERGENCY MEDICINE

## 2021-01-01 PROCEDURE — 97162 PT EVAL MOD COMPLEX 30 MIN: CPT | Performed by: PHYSICAL THERAPIST

## 2021-01-01 PROCEDURE — 84165 PROTEIN E-PHORESIS SERUM: CPT | Performed by: INTERNAL MEDICINE

## 2021-01-01 PROCEDURE — U0005 INFEC AGEN DETEC AMPLI PROBE: HCPCS | Performed by: EMERGENCY MEDICINE

## 2021-01-01 PROCEDURE — 83880 ASSAY OF NATRIURETIC PEPTIDE: CPT | Performed by: NURSE PRACTITIONER

## 2021-01-01 PROCEDURE — 97110 THERAPEUTIC EXERCISES: CPT

## 2021-01-01 PROCEDURE — 83935 ASSAY OF URINE OSMOLALITY: CPT | Performed by: HOSPITALIST

## 2021-01-01 PROCEDURE — 74220 X-RAY XM ESOPHAGUS 1CNTRST: CPT

## 2021-01-01 PROCEDURE — 80048 BASIC METABOLIC PNL TOTAL CA: CPT | Performed by: NURSE PRACTITIONER

## 2021-01-01 PROCEDURE — 99285 EMERGENCY DEPT VISIT HI MDM: CPT

## 2021-01-01 PROCEDURE — 80053 COMPREHEN METABOLIC PANEL: CPT | Performed by: HOSPITALIST

## 2021-01-01 PROCEDURE — 0202U NFCT DS 22 TRGT SARS-COV-2: CPT | Performed by: INTERNAL MEDICINE

## 2021-01-01 PROCEDURE — 86901 BLOOD TYPING SEROLOGIC RH(D): CPT

## 2021-01-01 PROCEDURE — 84300 ASSAY OF URINE SODIUM: CPT | Performed by: HOSPITALIST

## 2021-01-01 PROCEDURE — 85025 COMPLETE CBC W/AUTO DIFF WBC: CPT | Performed by: INTERNAL MEDICINE

## 2021-01-01 PROCEDURE — 85730 THROMBOPLASTIN TIME PARTIAL: CPT | Performed by: EMERGENCY MEDICINE

## 2021-01-01 PROCEDURE — 84295 ASSAY OF SERUM SODIUM: CPT | Performed by: NURSE PRACTITIONER

## 2021-01-01 PROCEDURE — 87086 URINE CULTURE/COLONY COUNT: CPT | Performed by: EMERGENCY MEDICINE

## 2021-01-01 PROCEDURE — U0004 COV-19 TEST NON-CDC HGH THRU: HCPCS | Performed by: EMERGENCY MEDICINE

## 2021-01-01 RX ORDER — GLYCOPYRROLATE 0.2 MG/ML
0.2 INJECTION INTRAMUSCULAR; INTRAVENOUS
Status: DISCONTINUED | OUTPATIENT
Start: 2021-01-01 | End: 2021-01-01 | Stop reason: HOSPADM

## 2021-01-01 RX ORDER — IPRATROPIUM BROMIDE AND ALBUTEROL SULFATE 2.5; .5 MG/3ML; MG/3ML
3 SOLUTION RESPIRATORY (INHALATION) EVERY 4 HOURS PRN
COMMUNITY

## 2021-01-01 RX ORDER — ACETAMINOPHEN 325 MG/1
650 TABLET ORAL EVERY 4 HOURS PRN
Status: DISCONTINUED | OUTPATIENT
Start: 2021-01-01 | End: 2021-01-01 | Stop reason: HOSPADM

## 2021-01-01 RX ORDER — ACETAMINOPHEN 160 MG/5ML
650 SOLUTION ORAL EVERY 4 HOURS PRN
Status: DISCONTINUED | OUTPATIENT
Start: 2021-01-01 | End: 2021-01-01 | Stop reason: HOSPADM

## 2021-01-01 RX ORDER — ACETAMINOPHEN 650 MG/1
650 SUPPOSITORY RECTAL EVERY 4 HOURS PRN
Status: DISCONTINUED | OUTPATIENT
Start: 2021-01-01 | End: 2021-01-01 | Stop reason: HOSPADM

## 2021-01-01 RX ORDER — MORPHINE SULFATE 20 MG/ML
5 SOLUTION ORAL EVERY 4 HOURS PRN
Status: ON HOLD | COMMUNITY
End: 2021-01-01

## 2021-01-01 RX ORDER — ALBUTEROL SULFATE 0.63 MG/3ML
0.63 SOLUTION RESPIRATORY (INHALATION) EVERY 4 HOURS PRN
Status: DISCONTINUED | OUTPATIENT
Start: 2021-01-01 | End: 2021-01-01 | Stop reason: HOSPADM

## 2021-01-01 RX ORDER — LORAZEPAM 2 MG/ML
0.5 CONCENTRATE ORAL EVERY 4 HOURS PRN
Status: ON HOLD | COMMUNITY
End: 2021-01-01

## 2021-01-01 RX ORDER — LEVOTHYROXINE SODIUM 0.05 MG/1
50 TABLET ORAL DAILY
Status: ON HOLD | COMMUNITY
End: 2021-01-01

## 2021-01-01 RX ORDER — LORAZEPAM 2 MG/ML
1 INJECTION INTRAMUSCULAR
Status: DISCONTINUED | OUTPATIENT
Start: 2021-01-01 | End: 2021-01-01 | Stop reason: HOSPADM

## 2021-01-01 RX ORDER — MORPHINE SULFATE 20 MG/ML
10 SOLUTION ORAL
Status: DISCONTINUED | OUTPATIENT
Start: 2021-01-01 | End: 2021-01-01 | Stop reason: HOSPADM

## 2021-01-01 RX ORDER — ONDANSETRON 4 MG/1
4 TABLET, FILM COATED ORAL EVERY 6 HOURS PRN
Qty: 15 TABLET | Refills: 0 | Status: SHIPPED | OUTPATIENT
Start: 2021-01-01

## 2021-01-01 RX ORDER — SODIUM CHLORIDE 1000 MG
1 TABLET, SOLUBLE MISCELLANEOUS
Status: DISCONTINUED | OUTPATIENT
Start: 2021-01-01 | End: 2021-01-01

## 2021-01-01 RX ORDER — MORPHINE SULFATE 20 MG/ML
20 SOLUTION ORAL
Status: DISCONTINUED | OUTPATIENT
Start: 2021-01-01 | End: 2021-01-01 | Stop reason: HOSPADM

## 2021-01-01 RX ORDER — ACETAMINOPHEN 325 MG/1
650 TABLET ORAL EVERY 4 HOURS PRN
COMMUNITY

## 2021-01-01 RX ORDER — PANTOPRAZOLE SODIUM 40 MG/1
40 TABLET, DELAYED RELEASE ORAL DAILY
Status: ON HOLD | COMMUNITY
End: 2021-01-01

## 2021-01-01 RX ORDER — GUAIFENESIN 600 MG/1
1200 TABLET, EXTENDED RELEASE ORAL 2 TIMES DAILY
Status: ON HOLD | COMMUNITY
End: 2021-01-01

## 2021-01-01 RX ORDER — SODIUM CHLORIDE 0.9 % (FLUSH) 0.9 %
10 SYRINGE (ML) INJECTION EVERY 12 HOURS SCHEDULED
Status: DISCONTINUED | OUTPATIENT
Start: 2021-01-01 | End: 2021-01-01 | Stop reason: HOSPADM

## 2021-01-01 RX ORDER — THEOPHYLLINE 400 MG/1
200 TABLET, EXTENDED RELEASE ORAL
Status: DISCONTINUED | OUTPATIENT
Start: 2021-01-01 | End: 2021-01-01 | Stop reason: HOSPADM

## 2021-01-01 RX ORDER — LORAZEPAM 2 MG/ML
2 INJECTION INTRAMUSCULAR
Status: DISCONTINUED | OUTPATIENT
Start: 2021-01-01 | End: 2021-01-01 | Stop reason: HOSPADM

## 2021-01-01 RX ORDER — LORAZEPAM 2 MG/ML
0.5 INJECTION INTRAMUSCULAR
Status: DISCONTINUED | OUTPATIENT
Start: 2021-01-01 | End: 2021-01-01 | Stop reason: HOSPADM

## 2021-01-01 RX ORDER — ONDANSETRON 2 MG/ML
4 INJECTION INTRAMUSCULAR; INTRAVENOUS EVERY 6 HOURS PRN
Status: DISCONTINUED | OUTPATIENT
Start: 2021-01-01 | End: 2021-01-01 | Stop reason: HOSPADM

## 2021-01-01 RX ORDER — DIPHENOXYLATE HYDROCHLORIDE AND ATROPINE SULFATE 2.5; .025 MG/1; MG/1
1 TABLET ORAL
Status: DISCONTINUED | OUTPATIENT
Start: 2021-01-01 | End: 2021-01-01 | Stop reason: HOSPADM

## 2021-01-01 RX ORDER — ALUMINA, MAGNESIA, AND SIMETHICONE 2400; 2400; 240 MG/30ML; MG/30ML; MG/30ML
15 SUSPENSION ORAL EVERY 6 HOURS PRN
Start: 2021-01-01

## 2021-01-01 RX ORDER — OXYBUTYNIN CHLORIDE 10 MG/1
10 TABLET, EXTENDED RELEASE ORAL NIGHTLY
Status: ON HOLD | COMMUNITY
End: 2021-01-01

## 2021-01-01 RX ORDER — CITALOPRAM 20 MG/1
20 TABLET ORAL DAILY
Status: ON HOLD | COMMUNITY
End: 2021-01-01

## 2021-01-01 RX ORDER — LORAZEPAM 2 MG/ML
2 CONCENTRATE ORAL
Status: DISCONTINUED | OUTPATIENT
Start: 2021-01-01 | End: 2021-01-01 | Stop reason: HOSPADM

## 2021-01-01 RX ORDER — CASTOR OIL AND BALSAM, PERU 788; 87 MG/G; MG/G
OINTMENT TOPICAL EVERY 12 HOURS SCHEDULED
Status: DISCONTINUED | OUTPATIENT
Start: 2021-01-01 | End: 2021-01-01 | Stop reason: HOSPADM

## 2021-01-01 RX ORDER — IPRATROPIUM BROMIDE AND ALBUTEROL SULFATE 2.5; .5 MG/3ML; MG/3ML
3 SOLUTION RESPIRATORY (INHALATION) EVERY 4 HOURS PRN
Status: CANCELLED | OUTPATIENT
Start: 2021-01-01

## 2021-01-01 RX ORDER — BISACODYL 10 MG
10 SUPPOSITORY, RECTAL RECTAL DAILY PRN
COMMUNITY

## 2021-01-01 RX ORDER — NYSTATIN 100000 [USP'U]/G
POWDER TOPICAL 2 TIMES DAILY
Status: ON HOLD | COMMUNITY
End: 2021-01-01

## 2021-01-01 RX ORDER — LORAZEPAM 2 MG/ML
0.5 CONCENTRATE ORAL
Status: DISCONTINUED | OUTPATIENT
Start: 2021-01-01 | End: 2021-01-01 | Stop reason: HOSPADM

## 2021-01-01 RX ORDER — TROLAMINE SALICYLATE 10 G/100G
CREAM TOPICAL AS NEEDED
Status: DISCONTINUED | OUTPATIENT
Start: 2021-01-01 | End: 2021-01-01

## 2021-01-01 RX ORDER — TOLVAPTAN 15 MG/1
15 TABLET ORAL ONCE
Status: DISCONTINUED | OUTPATIENT
Start: 2021-01-01 | End: 2021-01-01

## 2021-01-01 RX ORDER — SODIUM PHOSPHATE, DIBASIC AND SODIUM PHOSPHATE, MONOBASIC 7; 19 G/133ML; G/133ML
1 ENEMA RECTAL ONCE AS NEEDED
Status: ON HOLD | COMMUNITY
End: 2021-01-01

## 2021-01-01 RX ORDER — ALUMINA, MAGNESIA, AND SIMETHICONE 2400; 2400; 240 MG/30ML; MG/30ML; MG/30ML
15 SUSPENSION ORAL EVERY 6 HOURS PRN
COMMUNITY

## 2021-01-01 RX ORDER — PREDNISONE 20 MG/1
20 TABLET ORAL
Status: DISCONTINUED | OUTPATIENT
Start: 2021-01-01 | End: 2021-01-01

## 2021-01-01 RX ORDER — SUCRALFATE 1 G/1
1 TABLET ORAL 4 TIMES DAILY
Status: ON HOLD | COMMUNITY
End: 2021-01-01

## 2021-01-01 RX ORDER — PREDNISONE 20 MG/1
20 TABLET ORAL ONCE
Status: COMPLETED | OUTPATIENT
Start: 2021-01-01 | End: 2021-01-01

## 2021-01-01 RX ORDER — ACETAMINOPHEN 650 MG/1
650 SUPPOSITORY RECTAL EVERY 4 HOURS PRN
Status: CANCELLED | OUTPATIENT
Start: 2021-01-01

## 2021-01-01 RX ORDER — POLYMYXIN B SULFATE AND TRIMETHOPRIM 1; 10000 MG/ML; [USP'U]/ML
1 SOLUTION OPHTHALMIC EVERY 6 HOURS SCHEDULED
Status: DISCONTINUED | OUTPATIENT
Start: 2021-01-01 | End: 2021-01-01 | Stop reason: HOSPADM

## 2021-01-01 RX ORDER — HYDRALAZINE HYDROCHLORIDE 10 MG/1
10 TABLET, FILM COATED ORAL EVERY 8 HOURS SCHEDULED
Status: DISCONTINUED | OUTPATIENT
Start: 2021-01-01 | End: 2021-01-01 | Stop reason: HOSPADM

## 2021-01-01 RX ORDER — METHYLPREDNISOLONE SODIUM SUCCINATE 40 MG/ML
20 INJECTION, POWDER, LYOPHILIZED, FOR SOLUTION INTRAMUSCULAR; INTRAVENOUS EVERY 12 HOURS
Status: DISCONTINUED | OUTPATIENT
Start: 2021-01-01 | End: 2021-01-01

## 2021-01-01 RX ORDER — NITROGLYCERIN 0.4 MG/1
0.4 TABLET SUBLINGUAL
Status: DISCONTINUED | OUTPATIENT
Start: 2021-01-01 | End: 2021-01-01

## 2021-01-01 RX ORDER — ACETAMINOPHEN 160 MG/5ML
650 SOLUTION ORAL EVERY 4 HOURS PRN
Status: CANCELLED | OUTPATIENT
Start: 2021-01-01

## 2021-01-01 RX ORDER — CEFTRIAXONE SODIUM 1 G/50ML
1 INJECTION, SOLUTION INTRAVENOUS EVERY 24 HOURS
Status: DISCONTINUED | OUTPATIENT
Start: 2021-01-01 | End: 2021-01-01

## 2021-01-01 RX ORDER — ONDANSETRON 4 MG/1
4 TABLET, FILM COATED ORAL EVERY 6 HOURS PRN
Status: DISCONTINUED | OUTPATIENT
Start: 2021-01-01 | End: 2021-01-01 | Stop reason: HOSPADM

## 2021-01-01 RX ORDER — SODIUM CHLORIDE 0.9 % (FLUSH) 0.9 %
10 SYRINGE (ML) INJECTION AS NEEDED
Status: DISCONTINUED | OUTPATIENT
Start: 2021-01-01 | End: 2021-01-01 | Stop reason: HOSPADM

## 2021-01-01 RX ORDER — GLYCOPYRROLATE 0.2 MG/ML
0.4 INJECTION INTRAMUSCULAR; INTRAVENOUS
Status: DISCONTINUED | OUTPATIENT
Start: 2021-01-01 | End: 2021-01-01 | Stop reason: HOSPADM

## 2021-01-01 RX ORDER — TAMSULOSIN HYDROCHLORIDE 0.4 MG/1
1 CAPSULE ORAL DAILY
Status: ON HOLD | COMMUNITY
End: 2021-01-01

## 2021-01-01 RX ORDER — BISACODYL 5 MG/1
10 TABLET, DELAYED RELEASE ORAL DAILY PRN
Status: ON HOLD | COMMUNITY
End: 2021-01-01

## 2021-01-01 RX ORDER — MORPHINE SULFATE 20 MG/ML
5 SOLUTION ORAL
Status: DISCONTINUED | OUTPATIENT
Start: 2021-01-01 | End: 2021-01-01 | Stop reason: HOSPADM

## 2021-01-01 RX ORDER — NYSTATIN 100000 [USP'U]/G
POWDER TOPICAL EVERY 12 HOURS SCHEDULED
Status: CANCELLED | OUTPATIENT
Start: 2021-01-01

## 2021-01-01 RX ORDER — LORAZEPAM 2 MG/ML
1 CONCENTRATE ORAL
Status: DISCONTINUED | OUTPATIENT
Start: 2021-01-01 | End: 2021-01-01 | Stop reason: HOSPADM

## 2021-01-01 RX ORDER — PROMETHAZINE HYDROCHLORIDE 6.25 MG/5ML
6.25 SYRUP ORAL EVERY 4 HOURS PRN
Status: DISCONTINUED | OUTPATIENT
Start: 2021-01-01 | End: 2021-01-01 | Stop reason: HOSPADM

## 2021-01-01 RX ORDER — FORMOTEROL FUMARATE 20 UG/2ML
20 SOLUTION RESPIRATORY (INHALATION)
Status: ON HOLD | COMMUNITY
End: 2021-01-01

## 2021-01-01 RX ORDER — MORPHINE SULFATE 20 MG/ML
5 SOLUTION ORAL
Qty: 30 ML | Refills: 0 | Status: SHIPPED | OUTPATIENT
Start: 2021-01-01 | End: 2021-01-01

## 2021-01-01 RX ORDER — FUROSEMIDE 20 MG/1
20 TABLET ORAL
Status: DISCONTINUED | OUTPATIENT
Start: 2021-01-01 | End: 2021-01-01

## 2021-01-01 RX ORDER — MUSCLE RUB CREAM 100; 150 MG/G; MG/G
CREAM TOPICAL 4 TIMES DAILY PRN
Status: DISCONTINUED | OUTPATIENT
Start: 2021-01-01 | End: 2021-01-01 | Stop reason: HOSPADM

## 2021-01-01 RX ORDER — POTASSIUM CHLORIDE 750 MG/1
40 TABLET, FILM COATED, EXTENDED RELEASE ORAL DAILY
Status: DISCONTINUED | OUTPATIENT
Start: 2021-01-01 | End: 2021-01-01

## 2021-01-01 RX ORDER — POLYETHYLENE GLYCOL 3350 17 G/17G
17 POWDER, FOR SOLUTION ORAL DAILY
Status: ON HOLD | COMMUNITY
End: 2021-01-01

## 2021-01-01 RX ORDER — AZITHROMYCIN 250 MG/1
250 TABLET, FILM COATED ORAL
Status: COMPLETED | OUTPATIENT
Start: 2021-01-01 | End: 2021-01-01

## 2021-01-01 RX ORDER — PROMETHAZINE HYDROCHLORIDE 12.5 MG/1
6.25 SUPPOSITORY RECTAL EVERY 4 HOURS PRN
Status: DISCONTINUED | OUTPATIENT
Start: 2021-01-01 | End: 2021-01-01 | Stop reason: HOSPADM

## 2021-01-01 RX ORDER — MORPHINE SULFATE 2 MG/ML
4 INJECTION, SOLUTION INTRAMUSCULAR; INTRAVENOUS
Status: DISCONTINUED | OUTPATIENT
Start: 2021-01-01 | End: 2021-01-01 | Stop reason: HOSPADM

## 2021-01-01 RX ORDER — ACETAMINOPHEN 325 MG/1
650 TABLET ORAL 2 TIMES DAILY
COMMUNITY

## 2021-01-01 RX ORDER — PROMETHAZINE HYDROCHLORIDE 25 MG/1
6.25 TABLET ORAL EVERY 4 HOURS PRN
Status: DISCONTINUED | OUTPATIENT
Start: 2021-01-01 | End: 2021-01-01 | Stop reason: HOSPADM

## 2021-01-01 RX ORDER — CEFTRIAXONE SODIUM 1 G/50ML
1 INJECTION, SOLUTION INTRAVENOUS ONCE
Status: COMPLETED | OUTPATIENT
Start: 2021-01-01 | End: 2021-01-01

## 2021-01-01 RX ORDER — NYSTATIN 100000 [USP'U]/G
POWDER TOPICAL EVERY 12 HOURS SCHEDULED
Status: DISCONTINUED | OUTPATIENT
Start: 2021-01-01 | End: 2021-01-01 | Stop reason: HOSPADM

## 2021-01-01 RX ORDER — MORPHINE SULFATE 2 MG/ML
6 INJECTION, SOLUTION INTRAMUSCULAR; INTRAVENOUS
Status: DISCONTINUED | OUTPATIENT
Start: 2021-01-01 | End: 2021-01-01 | Stop reason: HOSPADM

## 2021-01-01 RX ORDER — ONDANSETRON 4 MG/1
4 TABLET, ORALLY DISINTEGRATING ORAL EVERY 6 HOURS PRN
Status: ON HOLD | COMMUNITY
End: 2021-01-01

## 2021-01-01 RX ORDER — PREDNISONE 20 MG/1
40 TABLET ORAL
Status: DISCONTINUED | OUTPATIENT
Start: 2021-01-01 | End: 2021-01-01

## 2021-01-01 RX ORDER — MORPHINE SULFATE 2 MG/ML
1 INJECTION, SOLUTION INTRAMUSCULAR; INTRAVENOUS
Status: DISCONTINUED | OUTPATIENT
Start: 2021-01-01 | End: 2021-01-01 | Stop reason: HOSPADM

## 2021-01-01 RX ORDER — ACETAMINOPHEN 325 MG/1
650 TABLET ORAL EVERY 4 HOURS PRN
Status: CANCELLED | OUTPATIENT
Start: 2021-01-01

## 2021-01-01 RX ORDER — PREDNISONE 10 MG/1
10 TABLET ORAL DAILY
Qty: 30 TABLET | Refills: 0 | Status: SHIPPED | OUTPATIENT
Start: 2021-01-01

## 2021-01-01 RX ORDER — SENNA PLUS 8.6 MG/1
2 TABLET ORAL DAILY PRN
Status: ON HOLD | COMMUNITY
End: 2021-01-01

## 2021-01-01 RX ADMIN — ARFORMOTEROL TARTRATE 15 MCG: 15 SOLUTION RESPIRATORY (INHALATION) at 09:45

## 2021-01-01 RX ADMIN — SUCRALFATE 1 G: 1 TABLET ORAL at 20:00

## 2021-01-01 RX ADMIN — Medication 5 MG: at 19:49

## 2021-01-01 RX ADMIN — PREDNISONE 30 MG: 20 TABLET ORAL at 08:44

## 2021-01-01 RX ADMIN — BUDESONIDE 1 MG: 1 SUSPENSION RESPIRATORY (INHALATION) at 08:19

## 2021-01-01 RX ADMIN — PANTOPRAZOLE SODIUM 40 MG: 40 TABLET, DELAYED RELEASE ORAL at 05:48

## 2021-01-01 RX ADMIN — CITALOPRAM 20 MG: 20 TABLET, FILM COATED ORAL at 10:27

## 2021-01-01 RX ADMIN — PANTOPRAZOLE SODIUM 40 MG: 40 TABLET, DELAYED RELEASE ORAL at 06:54

## 2021-01-01 RX ADMIN — IPRATROPIUM BROMIDE AND ALBUTEROL SULFATE 3 ML: 2.5; .5 SOLUTION RESPIRATORY (INHALATION) at 15:28

## 2021-01-01 RX ADMIN — HYDRALAZINE HYDROCHLORIDE 10 MG: 10 TABLET, FILM COATED ORAL at 14:10

## 2021-01-01 RX ADMIN — DILTIAZEM HYDROCHLORIDE 120 MG: 120 CAPSULE, COATED, EXTENDED RELEASE ORAL at 08:42

## 2021-01-01 RX ADMIN — HYDRALAZINE HYDROCHLORIDE 10 MG: 10 TABLET, FILM COATED ORAL at 00:35

## 2021-01-01 RX ADMIN — APIXABAN 5 MG: 5 TABLET, FILM COATED ORAL at 08:21

## 2021-01-01 RX ADMIN — Medication 5 MG: at 20:04

## 2021-01-01 RX ADMIN — POLYMYXIN B SULFATE, TRIMETHOPRIM SULFATE 1 DROP: 10000; 1 SOLUTION/ DROPS OPHTHALMIC at 00:24

## 2021-01-01 RX ADMIN — SUCRALFATE 1 G: 1 TABLET ORAL at 17:21

## 2021-01-01 RX ADMIN — SUCRALFATE 1 G: 1 TABLET ORAL at 12:37

## 2021-01-01 RX ADMIN — ACETAMINOPHEN 650 MG: 325 TABLET, FILM COATED ORAL at 01:43

## 2021-01-01 RX ADMIN — LORAZEPAM 0.5 MG: 2 INJECTION INTRAMUSCULAR; INTRAVENOUS at 22:52

## 2021-01-01 RX ADMIN — SUCRALFATE 1 G: 1 TABLET ORAL at 20:04

## 2021-01-01 RX ADMIN — SUCRALFATE 1 G: 1 TABLET ORAL at 22:47

## 2021-01-01 RX ADMIN — Medication 1 TABLET: at 08:34

## 2021-01-01 RX ADMIN — METHYLPREDNISOLONE SODIUM SUCCINATE 40 MG: 40 INJECTION, POWDER, FOR SOLUTION INTRAMUSCULAR; INTRAVENOUS at 08:23

## 2021-01-01 RX ADMIN — SODIUM CHLORIDE 1000 ML: 9 INJECTION, SOLUTION INTRAVENOUS at 08:27

## 2021-01-01 RX ADMIN — DOXYLAMINE SUCCINATE: 25 TABLET ORAL at 04:07

## 2021-01-01 RX ADMIN — DILTIAZEM HYDROCHLORIDE 120 MG: 120 CAPSULE, COATED, EXTENDED RELEASE ORAL at 09:35

## 2021-01-01 RX ADMIN — APIXABAN 5 MG: 5 TABLET, FILM COATED ORAL at 09:35

## 2021-01-01 RX ADMIN — HYDROMORPHONE HYDROCHLORIDE 1 MG: 1 INJECTION, SOLUTION INTRAMUSCULAR; INTRAVENOUS; SUBCUTANEOUS at 08:12

## 2021-01-01 RX ADMIN — POLYMYXIN B SULFATE, TRIMETHOPRIM SULFATE 1 DROP: 10000; 1 SOLUTION/ DROPS OPHTHALMIC at 18:03

## 2021-01-01 RX ADMIN — APIXABAN 5 MG: 5 TABLET, FILM COATED ORAL at 20:13

## 2021-01-01 RX ADMIN — LEVOTHYROXINE SODIUM 50 MCG: 50 TABLET ORAL at 06:39

## 2021-01-01 RX ADMIN — DILTIAZEM HYDROCHLORIDE 120 MG: 120 CAPSULE, COATED, EXTENDED RELEASE ORAL at 08:41

## 2021-01-01 RX ADMIN — MORPHINE SULFATE 1 MG: 2 INJECTION, SOLUTION INTRAMUSCULAR; INTRAVENOUS at 08:35

## 2021-01-01 RX ADMIN — Medication 1 TABLET: at 08:42

## 2021-01-01 RX ADMIN — HYDROMORPHONE HYDROCHLORIDE 1.5 MG: 1 INJECTION, SOLUTION INTRAMUSCULAR; INTRAVENOUS; SUBCUTANEOUS at 00:48

## 2021-01-01 RX ADMIN — LORAZEPAM 1 MG: 2 INJECTION INTRAMUSCULAR; INTRAVENOUS at 21:16

## 2021-01-01 RX ADMIN — PREDNISONE 40 MG: 20 TABLET ORAL at 08:33

## 2021-01-01 RX ADMIN — ARFORMOTEROL TARTRATE 15 MCG: 15 SOLUTION RESPIRATORY (INHALATION) at 21:05

## 2021-01-01 RX ADMIN — ARFORMOTEROL TARTRATE 15 MCG: 15 SOLUTION RESPIRATORY (INHALATION) at 07:12

## 2021-01-01 RX ADMIN — ACETAMINOPHEN 650 MG: 325 TABLET, FILM COATED ORAL at 05:48

## 2021-01-01 RX ADMIN — SUCRALFATE 1 G: 1 TABLET ORAL at 06:28

## 2021-01-01 RX ADMIN — GUAIFENESIN 1200 MG: 600 TABLET, EXTENDED RELEASE ORAL at 08:14

## 2021-01-01 RX ADMIN — LEVOTHYROXINE SODIUM 50 MCG: 50 TABLET ORAL at 05:47

## 2021-01-01 RX ADMIN — LEVOTHYROXINE SODIUM 50 MCG: 50 TABLET ORAL at 05:08

## 2021-01-01 RX ADMIN — APIXABAN 5 MG: 5 TABLET, FILM COATED ORAL at 20:00

## 2021-01-01 RX ADMIN — APIXABAN 5 MG: 5 TABLET, FILM COATED ORAL at 20:22

## 2021-01-01 RX ADMIN — SODIUM CHLORIDE, PRESERVATIVE FREE 10 ML: 5 INJECTION INTRAVENOUS at 23:38

## 2021-01-01 RX ADMIN — BUDESONIDE 1 MG: 1 SUSPENSION RESPIRATORY (INHALATION) at 08:05

## 2021-01-01 RX ADMIN — IPRATROPIUM BROMIDE AND ALBUTEROL SULFATE 3 ML: 2.5; .5 SOLUTION RESPIRATORY (INHALATION) at 13:11

## 2021-01-01 RX ADMIN — GUAIFENESIN 1200 MG: 600 TABLET, EXTENDED RELEASE ORAL at 08:34

## 2021-01-01 RX ADMIN — PANTOPRAZOLE SODIUM 40 MG: 40 TABLET, DELAYED RELEASE ORAL at 05:52

## 2021-01-01 RX ADMIN — SUCRALFATE 1 G: 1 TABLET ORAL at 08:44

## 2021-01-01 RX ADMIN — MORPHINE SULFATE 10 MG: 100 SOLUTION ORAL at 10:23

## 2021-01-01 RX ADMIN — MONTELUKAST SODIUM 10 MG: 10 TABLET, FILM COATED ORAL at 20:14

## 2021-01-01 RX ADMIN — SUCRALFATE 1 G: 1 TABLET ORAL at 11:29

## 2021-01-01 RX ADMIN — HYDRALAZINE HYDROCHLORIDE 10 MG: 10 TABLET, FILM COATED ORAL at 05:48

## 2021-01-01 RX ADMIN — IPRATROPIUM BROMIDE AND ALBUTEROL SULFATE 3 ML: 2.5; .5 SOLUTION RESPIRATORY (INHALATION) at 12:27

## 2021-01-01 RX ADMIN — SODIUM CHLORIDE, PRESERVATIVE FREE 10 ML: 5 INJECTION INTRAVENOUS at 21:19

## 2021-01-01 RX ADMIN — ACETAMINOPHEN 650 MG: 325 TABLET, FILM COATED ORAL at 21:21

## 2021-01-01 RX ADMIN — SUCRALFATE 1 G: 1 TABLET ORAL at 06:32

## 2021-01-01 RX ADMIN — IPRATROPIUM BROMIDE AND ALBUTEROL SULFATE 3 ML: 2.5; .5 SOLUTION RESPIRATORY (INHALATION) at 15:39

## 2021-01-01 RX ADMIN — ARFORMOTEROL TARTRATE 15 MCG: 15 SOLUTION RESPIRATORY (INHALATION) at 08:12

## 2021-01-01 RX ADMIN — APIXABAN 5 MG: 5 TABLET, FILM COATED ORAL at 08:41

## 2021-01-01 RX ADMIN — ARFORMOTEROL TARTRATE 15 MCG: 15 SOLUTION RESPIRATORY (INHALATION) at 20:16

## 2021-01-01 RX ADMIN — Medication 5 MG: at 21:11

## 2021-01-01 RX ADMIN — GUAIFENESIN 1200 MG: 600 TABLET, EXTENDED RELEASE ORAL at 21:12

## 2021-01-01 RX ADMIN — MORPHINE SULFATE 10 MG: 100 SOLUTION ORAL at 08:57

## 2021-01-01 RX ADMIN — POLYMYXIN B SULFATE, TRIMETHOPRIM SULFATE 1 DROP: 10000; 1 SOLUTION/ DROPS OPHTHALMIC at 01:50

## 2021-01-01 RX ADMIN — OXYBUTYNIN CHLORIDE 10 MG: 10 TABLET, EXTENDED RELEASE ORAL at 08:19

## 2021-01-01 RX ADMIN — APIXABAN 5 MG: 5 TABLET, FILM COATED ORAL at 08:42

## 2021-01-01 RX ADMIN — DOXYLAMINE SUCCINATE: 25 TABLET ORAL at 23:36

## 2021-01-01 RX ADMIN — LEVOTHYROXINE SODIUM 50 MCG: 50 TABLET ORAL at 05:42

## 2021-01-01 RX ADMIN — AZITHROMYCIN DIHYDRATE 500 MG: 250 TABLET, FILM COATED ORAL at 08:21

## 2021-01-01 RX ADMIN — SODIUM CHLORIDE, PRESERVATIVE FREE 10 ML: 5 INJECTION INTRAVENOUS at 22:47

## 2021-01-01 RX ADMIN — HYDRALAZINE HYDROCHLORIDE 10 MG: 10 TABLET, FILM COATED ORAL at 22:49

## 2021-01-01 RX ADMIN — DOXYLAMINE SUCCINATE 1 APPLICATION: 25 TABLET ORAL at 08:44

## 2021-01-01 RX ADMIN — SUCRALFATE 1 G: 1 TABLET ORAL at 23:38

## 2021-01-01 RX ADMIN — MONTELUKAST SODIUM 10 MG: 10 TABLET, FILM COATED ORAL at 22:47

## 2021-01-01 RX ADMIN — IPRATROPIUM BROMIDE AND ALBUTEROL SULFATE 3 ML: 2.5; .5 SOLUTION RESPIRATORY (INHALATION) at 12:13

## 2021-01-01 RX ADMIN — GUAIFENESIN 1200 MG: 600 TABLET, EXTENDED RELEASE ORAL at 08:21

## 2021-01-01 RX ADMIN — HYDROMORPHONE HYDROCHLORIDE 1 MG: 1 INJECTION, SOLUTION INTRAMUSCULAR; INTRAVENOUS; SUBCUTANEOUS at 16:35

## 2021-01-01 RX ADMIN — CASTOR OIL AND BALSAM, PERU 5 G: 788; 87 OINTMENT TOPICAL at 12:26

## 2021-01-01 RX ADMIN — ACETAMINOPHEN 650 MG: 325 TABLET, FILM COATED ORAL at 02:06

## 2021-01-01 RX ADMIN — METHYLPREDNISOLONE SODIUM SUCCINATE 20 MG: 40 INJECTION, POWDER, FOR SOLUTION INTRAMUSCULAR; INTRAVENOUS at 20:13

## 2021-01-01 RX ADMIN — DILTIAZEM HYDROCHLORIDE 120 MG: 120 CAPSULE, COATED, EXTENDED RELEASE ORAL at 09:02

## 2021-01-01 RX ADMIN — SUCRALFATE 1 G: 1 TABLET ORAL at 16:44

## 2021-01-01 RX ADMIN — IPRATROPIUM BROMIDE AND ALBUTEROL SULFATE 3 ML: 2.5; .5 SOLUTION RESPIRATORY (INHALATION) at 08:01

## 2021-01-01 RX ADMIN — CITALOPRAM 20 MG: 20 TABLET, FILM COATED ORAL at 08:21

## 2021-01-01 RX ADMIN — GLYCOPYRROLATE 0.4 MG: 0.2 INJECTION INTRAMUSCULAR; INTRAVENOUS at 00:28

## 2021-01-01 RX ADMIN — PREDNISONE 40 MG: 20 TABLET ORAL at 09:02

## 2021-01-01 RX ADMIN — MICONAZOLE NITRATE 1 APPLICATION: 2 CREAM TOPICAL at 08:49

## 2021-01-01 RX ADMIN — LEVOTHYROXINE SODIUM 50 MCG: 50 TABLET ORAL at 06:32

## 2021-01-01 RX ADMIN — IPRATROPIUM BROMIDE AND ALBUTEROL SULFATE 3 ML: 2.5; .5 SOLUTION RESPIRATORY (INHALATION) at 11:11

## 2021-01-01 RX ADMIN — POTASSIUM CHLORIDE 40 MEQ: 750 TABLET, EXTENDED RELEASE ORAL at 18:35

## 2021-01-01 RX ADMIN — HYDRALAZINE HYDROCHLORIDE 10 MG: 10 TABLET, FILM COATED ORAL at 15:08

## 2021-01-01 RX ADMIN — FUROSEMIDE 40 MG: 40 TABLET ORAL at 09:35

## 2021-01-01 RX ADMIN — DOXYLAMINE SUCCINATE: 25 TABLET ORAL at 05:48

## 2021-01-01 RX ADMIN — POTASSIUM CHLORIDE 40 MEQ: 750 TABLET, EXTENDED RELEASE ORAL at 08:42

## 2021-01-01 RX ADMIN — GUAIFENESIN 1200 MG: 600 TABLET, EXTENDED RELEASE ORAL at 08:44

## 2021-01-01 RX ADMIN — APIXABAN 5 MG: 5 TABLET, FILM COATED ORAL at 20:04

## 2021-01-01 RX ADMIN — GUAIFENESIN 1200 MG: 600 TABLET, EXTENDED RELEASE ORAL at 20:13

## 2021-01-01 RX ADMIN — ARFORMOTEROL TARTRATE 15 MCG: 15 SOLUTION RESPIRATORY (INHALATION) at 21:25

## 2021-01-01 RX ADMIN — THEOPHYLLINE 200 MG: 400 TABLET, EXTENDED RELEASE ORAL at 08:45

## 2021-01-01 RX ADMIN — GLYCOPYRROLATE 0.4 MG: 0.2 INJECTION INTRAMUSCULAR; INTRAVENOUS at 21:24

## 2021-01-01 RX ADMIN — GUAIFENESIN 1200 MG: 600 TABLET, EXTENDED RELEASE ORAL at 22:47

## 2021-01-01 RX ADMIN — SUCRALFATE 1 G: 1 TABLET ORAL at 05:47

## 2021-01-01 RX ADMIN — GUAIFENESIN 1200 MG: 600 TABLET, EXTENDED RELEASE ORAL at 09:02

## 2021-01-01 RX ADMIN — HYDRALAZINE HYDROCHLORIDE 10 MG: 10 TABLET, FILM COATED ORAL at 17:21

## 2021-01-01 RX ADMIN — MICONAZOLE NITRATE: 2 CREAM TOPICAL at 22:50

## 2021-01-01 RX ADMIN — APIXABAN 5 MG: 5 TABLET, FILM COATED ORAL at 08:34

## 2021-01-01 RX ADMIN — SUCRALFATE 1 G: 1 TABLET ORAL at 16:38

## 2021-01-01 RX ADMIN — IPRATROPIUM BROMIDE AND ALBUTEROL SULFATE 3 ML: 2.5; .5 SOLUTION RESPIRATORY (INHALATION) at 16:25

## 2021-01-01 RX ADMIN — OXYBUTYNIN CHLORIDE 10 MG: 10 TABLET, EXTENDED RELEASE ORAL at 09:35

## 2021-01-01 RX ADMIN — Medication 5 MG: at 21:52

## 2021-01-01 RX ADMIN — GUAIFENESIN 1200 MG: 600 TABLET, EXTENDED RELEASE ORAL at 08:42

## 2021-01-01 RX ADMIN — SUCRALFATE 1 G: 1 TABLET ORAL at 20:13

## 2021-01-01 RX ADMIN — DOXYLAMINE SUCCINATE: 25 TABLET ORAL at 22:49

## 2021-01-01 RX ADMIN — HYDRALAZINE HYDROCHLORIDE 10 MG: 10 TABLET, FILM COATED ORAL at 06:32

## 2021-01-01 RX ADMIN — THEOPHYLLINE 200 MG: 400 TABLET, EXTENDED RELEASE ORAL at 10:27

## 2021-01-01 RX ADMIN — HYDRALAZINE HYDROCHLORIDE 10 MG: 10 TABLET, FILM COATED ORAL at 05:29

## 2021-01-01 RX ADMIN — IPRATROPIUM BROMIDE AND ALBUTEROL SULFATE 3 ML: 2.5; .5 SOLUTION RESPIRATORY (INHALATION) at 15:37

## 2021-01-01 RX ADMIN — APIXABAN 5 MG: 5 TABLET, FILM COATED ORAL at 22:46

## 2021-01-01 RX ADMIN — ARFORMOTEROL TARTRATE 15 MCG: 15 SOLUTION RESPIRATORY (INHALATION) at 20:08

## 2021-01-01 RX ADMIN — DOXYLAMINE SUCCINATE: 25 TABLET ORAL at 15:15

## 2021-01-01 RX ADMIN — MICONAZOLE NITRATE: 2 CREAM TOPICAL at 08:43

## 2021-01-01 RX ADMIN — ARFORMOTEROL TARTRATE 15 MCG: 15 SOLUTION RESPIRATORY (INHALATION) at 23:36

## 2021-01-01 RX ADMIN — LORAZEPAM 1 MG: 2 INJECTION INTRAMUSCULAR; INTRAVENOUS at 16:34

## 2021-01-01 RX ADMIN — CITALOPRAM 20 MG: 20 TABLET, FILM COATED ORAL at 08:34

## 2021-01-01 RX ADMIN — ARFORMOTEROL TARTRATE 15 MCG: 15 SOLUTION RESPIRATORY (INHALATION) at 08:54

## 2021-01-01 RX ADMIN — ARFORMOTEROL TARTRATE 15 MCG: 15 SOLUTION RESPIRATORY (INHALATION) at 08:04

## 2021-01-01 RX ADMIN — PANTOPRAZOLE SODIUM 40 MG: 40 TABLET, DELAYED RELEASE ORAL at 06:32

## 2021-01-01 RX ADMIN — SUCRALFATE 1 G: 1 TABLET ORAL at 06:39

## 2021-01-01 RX ADMIN — MONTELUKAST SODIUM 10 MG: 10 TABLET, FILM COATED ORAL at 23:37

## 2021-01-01 RX ADMIN — GUAIFENESIN 1200 MG: 600 TABLET, EXTENDED RELEASE ORAL at 20:00

## 2021-01-01 RX ADMIN — DOXYLAMINE SUCCINATE: 25 TABLET ORAL at 06:57

## 2021-01-01 RX ADMIN — LEVOTHYROXINE SODIUM 50 MCG: 50 TABLET ORAL at 05:39

## 2021-01-01 RX ADMIN — HYDROMORPHONE HYDROCHLORIDE 1 MG: 1 INJECTION, SOLUTION INTRAMUSCULAR; INTRAVENOUS; SUBCUTANEOUS at 00:24

## 2021-01-01 RX ADMIN — Medication 5 MG: at 20:13

## 2021-01-01 RX ADMIN — PANTOPRAZOLE SODIUM 40 MG: 40 TABLET, DELAYED RELEASE ORAL at 06:39

## 2021-01-01 RX ADMIN — APIXABAN 5 MG: 5 TABLET, FILM COATED ORAL at 19:49

## 2021-01-01 RX ADMIN — DILTIAZEM HYDROCHLORIDE 120 MG: 120 CAPSULE, COATED, EXTENDED RELEASE ORAL at 08:19

## 2021-01-01 RX ADMIN — CITALOPRAM 20 MG: 20 TABLET, FILM COATED ORAL at 08:44

## 2021-01-01 RX ADMIN — BUDESONIDE 1 MG: 1 SUSPENSION RESPIRATORY (INHALATION) at 14:34

## 2021-01-01 RX ADMIN — HYDRALAZINE HYDROCHLORIDE 10 MG: 10 TABLET, FILM COATED ORAL at 06:39

## 2021-01-01 RX ADMIN — OXYBUTYNIN CHLORIDE 10 MG: 10 TABLET, EXTENDED RELEASE ORAL at 08:14

## 2021-01-01 RX ADMIN — SUCRALFATE 1 G: 1 TABLET ORAL at 12:25

## 2021-01-01 RX ADMIN — THEOPHYLLINE 200 MG: 400 TABLET, EXTENDED RELEASE ORAL at 08:42

## 2021-01-01 RX ADMIN — IPRATROPIUM BROMIDE AND ALBUTEROL SULFATE 3 ML: 2.5; .5 SOLUTION RESPIRATORY (INHALATION) at 15:26

## 2021-01-01 RX ADMIN — BUDESONIDE 1 MG: 1 SUSPENSION RESPIRATORY (INHALATION) at 11:36

## 2021-01-01 RX ADMIN — ACETAMINOPHEN 650 MG: 325 TABLET, FILM COATED ORAL at 04:07

## 2021-01-01 RX ADMIN — LORAZEPAM 0.5 MG: 2 INJECTION INTRAMUSCULAR; INTRAVENOUS at 22:32

## 2021-01-01 RX ADMIN — HYDRALAZINE HYDROCHLORIDE 10 MG: 10 TABLET, FILM COATED ORAL at 13:42

## 2021-01-01 RX ADMIN — MICONAZOLE NITRATE: 2 CREAM TOPICAL at 20:02

## 2021-01-01 RX ADMIN — ACETAMINOPHEN 650 MG: 325 TABLET, FILM COATED ORAL at 15:14

## 2021-01-01 RX ADMIN — ARFORMOTEROL TARTRATE 15 MCG: 15 SOLUTION RESPIRATORY (INHALATION) at 09:53

## 2021-01-01 RX ADMIN — ALBUTEROL SULFATE 0.63 MG: 0.63 SOLUTION RESPIRATORY (INHALATION) at 04:35

## 2021-01-01 RX ADMIN — BARIUM SULFATE 183 ML: 960 POWDER, FOR SUSPENSION ORAL at 11:37

## 2021-01-01 RX ADMIN — BUDESONIDE 1 MG: 1 SUSPENSION RESPIRATORY (INHALATION) at 08:55

## 2021-01-01 RX ADMIN — SUCRALFATE 1 G: 1 TABLET ORAL at 10:58

## 2021-01-01 RX ADMIN — CITALOPRAM 20 MG: 20 TABLET, FILM COATED ORAL at 08:42

## 2021-01-01 RX ADMIN — BUDESONIDE 1 MG: 1 SUSPENSION RESPIRATORY (INHALATION) at 07:36

## 2021-01-01 RX ADMIN — MICONAZOLE NITRATE: 2 CREAM TOPICAL at 23:39

## 2021-01-01 RX ADMIN — PANTOPRAZOLE SODIUM 40 MG: 40 TABLET, DELAYED RELEASE ORAL at 05:29

## 2021-01-01 RX ADMIN — HYDROMORPHONE HYDROCHLORIDE 1 MG: 1 INJECTION, SOLUTION INTRAMUSCULAR; INTRAVENOUS; SUBCUTANEOUS at 17:26

## 2021-01-01 RX ADMIN — HYDRALAZINE HYDROCHLORIDE 10 MG: 10 TABLET, FILM COATED ORAL at 06:54

## 2021-01-01 RX ADMIN — MORPHINE SULFATE 10 MG: 100 SOLUTION ORAL at 03:24

## 2021-01-01 RX ADMIN — Medication 5 MG: at 22:47

## 2021-01-01 RX ADMIN — CITALOPRAM 20 MG: 20 TABLET, FILM COATED ORAL at 09:35

## 2021-01-01 RX ADMIN — SODIUM CHLORIDE TAB 1 GM 1 G: 1 TAB at 08:42

## 2021-01-01 RX ADMIN — APIXABAN 5 MG: 5 TABLET, FILM COATED ORAL at 09:03

## 2021-01-01 RX ADMIN — APIXABAN 5 MG: 5 TABLET, FILM COATED ORAL at 16:17

## 2021-01-01 RX ADMIN — FUROSEMIDE 20 MG: 20 TABLET ORAL at 23:37

## 2021-01-01 RX ADMIN — FUROSEMIDE 20 MG: 20 TABLET ORAL at 12:18

## 2021-01-01 RX ADMIN — MORPHINE SULFATE 10 MG: 100 SOLUTION ORAL at 19:28

## 2021-01-01 RX ADMIN — Medication 5 MG: at 20:14

## 2021-01-01 RX ADMIN — GUAIFENESIN 1200 MG: 600 TABLET, EXTENDED RELEASE ORAL at 20:04

## 2021-01-01 RX ADMIN — SUCRALFATE 1 G: 1 TABLET ORAL at 21:12

## 2021-01-01 RX ADMIN — OXYBUTYNIN CHLORIDE 10 MG: 10 TABLET, EXTENDED RELEASE ORAL at 08:34

## 2021-01-01 RX ADMIN — MORPHINE SULFATE 10 MG: 100 SOLUTION ORAL at 13:41

## 2021-01-01 RX ADMIN — SUCRALFATE 1 G: 1 TABLET ORAL at 12:18

## 2021-01-01 RX ADMIN — ACETAMINOPHEN 650 MG: 325 TABLET, FILM COATED ORAL at 23:33

## 2021-01-01 RX ADMIN — CASTOR OIL AND BALSAM, PERU 5 G: 788; 87 OINTMENT TOPICAL at 21:17

## 2021-01-01 RX ADMIN — PANTOPRAZOLE SODIUM 40 MG: 40 TABLET, DELAYED RELEASE ORAL at 05:08

## 2021-01-01 RX ADMIN — SODIUM CHLORIDE, PRESERVATIVE FREE 10 ML: 5 INJECTION INTRAVENOUS at 08:12

## 2021-01-01 RX ADMIN — HYDROMORPHONE HYDROCHLORIDE 1 MG: 1 INJECTION, SOLUTION INTRAMUSCULAR; INTRAVENOUS; SUBCUTANEOUS at 12:26

## 2021-01-01 RX ADMIN — HYDROMORPHONE HYDROCHLORIDE 1 MG: 1 INJECTION, SOLUTION INTRAMUSCULAR; INTRAVENOUS; SUBCUTANEOUS at 21:26

## 2021-01-01 RX ADMIN — SODIUM CHLORIDE, PRESERVATIVE FREE 10 ML: 5 INJECTION INTRAVENOUS at 21:12

## 2021-01-01 RX ADMIN — ARFORMOTEROL TARTRATE 15 MCG: 15 SOLUTION RESPIRATORY (INHALATION) at 08:29

## 2021-01-01 RX ADMIN — Medication 5 MG: at 23:37

## 2021-01-01 RX ADMIN — ARFORMOTEROL TARTRATE 15 MCG: 15 SOLUTION RESPIRATORY (INHALATION) at 08:05

## 2021-01-01 RX ADMIN — OXYBUTYNIN CHLORIDE 10 MG: 10 TABLET, EXTENDED RELEASE ORAL at 08:44

## 2021-01-01 RX ADMIN — DILTIAZEM HYDROCHLORIDE 120 MG: 120 CAPSULE, COATED, EXTENDED RELEASE ORAL at 08:44

## 2021-01-01 RX ADMIN — Medication 1 TABLET: at 08:14

## 2021-01-01 RX ADMIN — ARFORMOTEROL TARTRATE 15 MCG: 15 SOLUTION RESPIRATORY (INHALATION) at 19:31

## 2021-01-01 RX ADMIN — THEOPHYLLINE 200 MG: 400 TABLET, EXTENDED RELEASE ORAL at 08:20

## 2021-01-01 RX ADMIN — Medication 1 TABLET: at 09:35

## 2021-01-01 RX ADMIN — IPRATROPIUM BROMIDE AND ALBUTEROL SULFATE 3 ML: 2.5; .5 SOLUTION RESPIRATORY (INHALATION) at 12:04

## 2021-01-01 RX ADMIN — TROLAMINE SALICYLATE: 10 CREAM TOPICAL at 10:16

## 2021-01-01 RX ADMIN — FUROSEMIDE 40 MG: 40 TABLET ORAL at 08:34

## 2021-01-01 RX ADMIN — GUAIFENESIN 1200 MG: 600 TABLET, EXTENDED RELEASE ORAL at 08:19

## 2021-01-01 RX ADMIN — GUAIFENESIN 1200 MG: 600 TABLET, EXTENDED RELEASE ORAL at 20:14

## 2021-01-01 RX ADMIN — MONTELUKAST SODIUM 10 MG: 10 TABLET, FILM COATED ORAL at 20:00

## 2021-01-01 RX ADMIN — LORAZEPAM 0.5 MG: 2 SOLUTION, CONCENTRATE ORAL at 18:14

## 2021-01-01 RX ADMIN — ARFORMOTEROL TARTRATE 15 MCG: 15 SOLUTION RESPIRATORY (INHALATION) at 07:36

## 2021-01-01 RX ADMIN — POLYMYXIN B SULFATE, TRIMETHOPRIM SULFATE 1 DROP: 10000; 1 SOLUTION/ DROPS OPHTHALMIC at 12:27

## 2021-01-01 RX ADMIN — DOXYLAMINE SUCCINATE: 25 TABLET ORAL at 20:22

## 2021-01-01 RX ADMIN — LORAZEPAM 1 MG: 2 INJECTION INTRAMUSCULAR; INTRAVENOUS at 00:24

## 2021-01-01 RX ADMIN — SODIUM CHLORIDE TAB 1 GM 1 G: 1 TAB at 12:37

## 2021-01-01 RX ADMIN — PREDNISONE 20 MG: 20 TABLET ORAL at 08:14

## 2021-01-01 RX ADMIN — SUCRALFATE 1 G: 1 TABLET ORAL at 11:09

## 2021-01-01 RX ADMIN — LORAZEPAM 0.5 MG: 2 INJECTION INTRAMUSCULAR; INTRAVENOUS at 01:35

## 2021-01-01 RX ADMIN — OXYBUTYNIN CHLORIDE 10 MG: 10 TABLET, EXTENDED RELEASE ORAL at 10:25

## 2021-01-01 RX ADMIN — IPRATROPIUM BROMIDE AND ALBUTEROL SULFATE 3 ML: 2.5; .5 SOLUTION RESPIRATORY (INHALATION) at 21:10

## 2021-01-01 RX ADMIN — PREDNISONE 40 MG: 20 TABLET ORAL at 08:42

## 2021-01-01 RX ADMIN — APIXABAN 5 MG: 5 TABLET, FILM COATED ORAL at 08:14

## 2021-01-01 RX ADMIN — SUCRALFATE 1 G: 1 TABLET ORAL at 18:35

## 2021-01-01 RX ADMIN — SUCRALFATE 1 G: 1 TABLET ORAL at 20:22

## 2021-01-01 RX ADMIN — HYDROMORPHONE HYDROCHLORIDE 1 MG: 1 INJECTION, SOLUTION INTRAMUSCULAR; INTRAVENOUS; SUBCUTANEOUS at 21:16

## 2021-01-01 RX ADMIN — LEVOTHYROXINE SODIUM 50 MCG: 50 TABLET ORAL at 06:54

## 2021-01-01 RX ADMIN — SUCRALFATE 1 G: 1 TABLET ORAL at 12:51

## 2021-01-01 RX ADMIN — DILTIAZEM HYDROCHLORIDE 120 MG: 120 CAPSULE, COATED, EXTENDED RELEASE ORAL at 08:14

## 2021-01-01 RX ADMIN — IPRATROPIUM BROMIDE AND ALBUTEROL SULFATE 3 ML: 2.5; .5 SOLUTION RESPIRATORY (INHALATION) at 13:39

## 2021-01-01 RX ADMIN — NYSTATIN: 100000 POWDER TOPICAL at 21:24

## 2021-01-01 RX ADMIN — SUCRALFATE 1 G: 1 TABLET ORAL at 17:27

## 2021-01-01 RX ADMIN — PREDNISONE 20 MG: 20 TABLET ORAL at 17:33

## 2021-01-01 RX ADMIN — SUCRALFATE 1 G: 1 TABLET ORAL at 17:17

## 2021-01-01 RX ADMIN — IPRATROPIUM BROMIDE AND ALBUTEROL SULFATE 3 ML: 2.5; .5 SOLUTION RESPIRATORY (INHALATION) at 15:21

## 2021-01-01 RX ADMIN — DILTIAZEM HYDROCHLORIDE 120 MG: 120 CAPSULE, COATED, EXTENDED RELEASE ORAL at 08:21

## 2021-01-01 RX ADMIN — DOXYLAMINE SUCCINATE: 25 TABLET ORAL at 21:12

## 2021-01-01 RX ADMIN — MORPHINE SULFATE 1 MG: 2 INJECTION, SOLUTION INTRAMUSCULAR; INTRAVENOUS at 22:33

## 2021-01-01 RX ADMIN — ACETAMINOPHEN 650 MG: 325 TABLET, FILM COATED ORAL at 04:10

## 2021-01-01 RX ADMIN — APIXABAN 5 MG: 5 TABLET, FILM COATED ORAL at 20:14

## 2021-01-01 RX ADMIN — BUDESONIDE 1 MG: 1 SUSPENSION RESPIRATORY (INHALATION) at 08:04

## 2021-01-01 RX ADMIN — ARFORMOTEROL TARTRATE 15 MCG: 15 SOLUTION RESPIRATORY (INHALATION) at 22:57

## 2021-01-01 RX ADMIN — FUROSEMIDE 20 MG: 20 TABLET ORAL at 08:42

## 2021-01-01 RX ADMIN — OXYBUTYNIN CHLORIDE 10 MG: 10 TABLET, EXTENDED RELEASE ORAL at 08:42

## 2021-01-01 RX ADMIN — FUROSEMIDE 40 MG: 40 TABLET ORAL at 08:14

## 2021-01-01 RX ADMIN — AZITHROMYCIN DIHYDRATE 250 MG: 250 TABLET, FILM COATED ORAL at 08:34

## 2021-01-01 RX ADMIN — ACETAMINOPHEN 650 MG: 325 TABLET, FILM COATED ORAL at 19:38

## 2021-01-01 RX ADMIN — FUROSEMIDE 40 MG: 40 TABLET ORAL at 08:21

## 2021-01-01 RX ADMIN — SUCRALFATE 1 G: 1 TABLET ORAL at 10:25

## 2021-01-01 RX ADMIN — MICONAZOLE NITRATE: 2 CREAM TOPICAL at 08:35

## 2021-01-01 RX ADMIN — APIXABAN 5 MG: 5 TABLET, FILM COATED ORAL at 19:40

## 2021-01-01 RX ADMIN — APIXABAN 5 MG: 5 TABLET, FILM COATED ORAL at 10:25

## 2021-01-01 RX ADMIN — GUAIFENESIN 1200 MG: 600 TABLET, EXTENDED RELEASE ORAL at 09:35

## 2021-01-01 RX ADMIN — MORPHINE SULFATE 10 MG: 100 SOLUTION ORAL at 00:34

## 2021-01-01 RX ADMIN — GUAIFENESIN 1200 MG: 600 TABLET, EXTENDED RELEASE ORAL at 23:38

## 2021-01-01 RX ADMIN — MORPHINE SULFATE 5 MG: 100 SOLUTION ORAL at 19:47

## 2021-01-01 RX ADMIN — LEVOTHYROXINE SODIUM 50 MCG: 50 TABLET ORAL at 05:52

## 2021-01-01 RX ADMIN — MICONAZOLE NITRATE: 2 CREAM TOPICAL at 09:03

## 2021-01-01 RX ADMIN — Medication 5 MG: at 19:39

## 2021-01-01 RX ADMIN — AZITHROMYCIN DIHYDRATE 500 MG: 250 TABLET, FILM COATED ORAL at 09:35

## 2021-01-01 RX ADMIN — IPRATROPIUM BROMIDE AND ALBUTEROL SULFATE 3 ML: 2.5; .5 SOLUTION RESPIRATORY (INHALATION) at 14:08

## 2021-01-01 RX ADMIN — LORAZEPAM 0.5 MG: 2 INJECTION INTRAMUSCULAR; INTRAVENOUS at 21:52

## 2021-01-01 RX ADMIN — AZITHROMYCIN DIHYDRATE 250 MG: 250 TABLET, FILM COATED ORAL at 08:14

## 2021-01-01 RX ADMIN — SODIUM CHLORIDE TAB 1 GM 1 G: 1 TAB at 23:37

## 2021-01-01 RX ADMIN — LORAZEPAM 0.5 MG: 2 INJECTION INTRAMUSCULAR; INTRAVENOUS at 03:25

## 2021-01-01 RX ADMIN — LORAZEPAM 0.5 MG: 2 INJECTION INTRAMUSCULAR; INTRAVENOUS at 00:07

## 2021-01-01 RX ADMIN — PANTOPRAZOLE SODIUM 40 MG: 40 TABLET, DELAYED RELEASE ORAL at 05:41

## 2021-01-01 RX ADMIN — Medication 5 MG: at 20:00

## 2021-01-01 RX ADMIN — SODIUM CHLORIDE, PRESERVATIVE FREE 10 ML: 5 INJECTION INTRAVENOUS at 21:24

## 2021-01-01 RX ADMIN — MORPHINE SULFATE 10 MG: 100 SOLUTION ORAL at 19:49

## 2021-01-01 RX ADMIN — HYDRALAZINE HYDROCHLORIDE 10 MG: 10 TABLET, FILM COATED ORAL at 16:57

## 2021-01-01 RX ADMIN — MONTELUKAST SODIUM 10 MG: 10 TABLET, FILM COATED ORAL at 19:38

## 2021-01-01 RX ADMIN — SODIUM CHLORIDE, PRESERVATIVE FREE 10 ML: 5 INJECTION INTRAVENOUS at 10:02

## 2021-01-01 RX ADMIN — CITALOPRAM 20 MG: 20 TABLET, FILM COATED ORAL at 08:19

## 2021-01-01 RX ADMIN — ARFORMOTEROL TARTRATE 15 MCG: 15 SOLUTION RESPIRATORY (INHALATION) at 19:58

## 2021-01-01 RX ADMIN — LORAZEPAM 0.5 MG: 2 INJECTION INTRAMUSCULAR; INTRAVENOUS at 20:32

## 2021-01-01 RX ADMIN — NYSTATIN: 100000 POWDER TOPICAL at 01:45

## 2021-01-01 RX ADMIN — MONTELUKAST SODIUM 10 MG: 10 TABLET, FILM COATED ORAL at 20:13

## 2021-01-01 RX ADMIN — OXYBUTYNIN CHLORIDE 10 MG: 10 TABLET, EXTENDED RELEASE ORAL at 09:02

## 2021-01-01 RX ADMIN — MICONAZOLE NITRATE: 2 CREAM TOPICAL at 21:12

## 2021-01-01 RX ADMIN — CITALOPRAM 20 MG: 20 TABLET, FILM COATED ORAL at 08:14

## 2021-01-01 RX ADMIN — SUCRALFATE 1 G: 1 TABLET ORAL at 06:30

## 2021-01-01 RX ADMIN — Medication 1 TABLET: at 09:02

## 2021-01-01 RX ADMIN — BUDESONIDE 1 MG: 1 SUSPENSION RESPIRATORY (INHALATION) at 08:22

## 2021-01-01 RX ADMIN — APIXABAN 5 MG: 5 TABLET, FILM COATED ORAL at 08:19

## 2021-01-01 RX ADMIN — IPRATROPIUM BROMIDE AND ALBUTEROL SULFATE 3 ML: 2.5; .5 SOLUTION RESPIRATORY (INHALATION) at 05:36

## 2021-01-01 RX ADMIN — POTASSIUM CHLORIDE 40 MEQ: 750 TABLET, EXTENDED RELEASE ORAL at 09:08

## 2021-01-01 RX ADMIN — ACETAMINOPHEN 650 MG: 325 TABLET, FILM COATED ORAL at 08:45

## 2021-01-01 RX ADMIN — IPRATROPIUM BROMIDE AND ALBUTEROL SULFATE 3 ML: 2.5; .5 SOLUTION RESPIRATORY (INHALATION) at 16:27

## 2021-01-01 RX ADMIN — PREDNISONE 20 MG: 20 TABLET ORAL at 10:16

## 2021-01-01 RX ADMIN — MORPHINE SULFATE 10 MG: 100 SOLUTION ORAL at 13:37

## 2021-01-01 RX ADMIN — MONTELUKAST SODIUM 10 MG: 10 TABLET, FILM COATED ORAL at 21:11

## 2021-01-01 RX ADMIN — GUAIFENESIN 1200 MG: 600 TABLET, EXTENDED RELEASE ORAL at 20:22

## 2021-01-01 RX ADMIN — OXYBUTYNIN CHLORIDE 10 MG: 10 TABLET, EXTENDED RELEASE ORAL at 08:21

## 2021-01-01 RX ADMIN — GUAIFENESIN 600 MG: 600 TABLET, EXTENDED RELEASE ORAL at 19:39

## 2021-01-01 RX ADMIN — GUAIFENESIN 1200 MG: 600 TABLET, EXTENDED RELEASE ORAL at 19:49

## 2021-01-01 RX ADMIN — IPRATROPIUM BROMIDE AND ALBUTEROL SULFATE 3 ML: 2.5; .5 SOLUTION RESPIRATORY (INHALATION) at 16:52

## 2021-01-01 RX ADMIN — HYDRALAZINE HYDROCHLORIDE 10 MG: 10 TABLET, FILM COATED ORAL at 21:11

## 2021-01-01 RX ADMIN — CITALOPRAM 20 MG: 20 TABLET, FILM COATED ORAL at 09:02

## 2021-01-01 RX ADMIN — APIXABAN 5 MG: 5 TABLET, FILM COATED ORAL at 23:36

## 2021-01-01 RX ADMIN — LORAZEPAM 0.5 MG: 2 SOLUTION, CONCENTRATE ORAL at 08:56

## 2021-01-01 RX ADMIN — DILTIAZEM HYDROCHLORIDE 120 MG: 120 CAPSULE, COATED, EXTENDED RELEASE ORAL at 10:25

## 2021-01-01 RX ADMIN — DILTIAZEM HYDROCHLORIDE 120 MG: 120 CAPSULE, COATED, EXTENDED RELEASE ORAL at 08:34

## 2021-01-01 RX ADMIN — HYDROMORPHONE HYDROCHLORIDE 1.5 MG: 1 INJECTION, SOLUTION INTRAMUSCULAR; INTRAVENOUS; SUBCUTANEOUS at 04:15

## 2021-01-01 RX ADMIN — POLYMYXIN B SULFATE, TRIMETHOPRIM SULFATE 1 DROP: 10000; 1 SOLUTION/ DROPS OPHTHALMIC at 05:29

## 2021-01-01 RX ADMIN — HYDRALAZINE HYDROCHLORIDE 10 MG: 10 TABLET, FILM COATED ORAL at 12:37

## 2021-01-01 RX ADMIN — Medication 1 TABLET: at 08:21

## 2021-01-01 RX ADMIN — HYDRALAZINE HYDROCHLORIDE 10 MG: 10 TABLET, FILM COATED ORAL at 19:49

## 2021-01-01 RX ADMIN — SUCRALFATE 1 G: 1 TABLET ORAL at 06:54

## 2021-01-01 RX ADMIN — SUCRALFATE 1 G: 1 TABLET ORAL at 19:49

## 2021-01-01 RX ADMIN — Medication 5 MG: at 20:22

## 2021-01-01 RX ADMIN — NYSTATIN 1 APPLICATION: 100000 POWDER TOPICAL at 08:13

## 2021-01-01 RX ADMIN — MICONAZOLE NITRATE 1 APPLICATION: 2 CREAM TOPICAL at 10:54

## 2021-01-01 RX ADMIN — HYDRALAZINE HYDROCHLORIDE 10 MG: 10 TABLET, FILM COATED ORAL at 23:37

## 2021-01-01 RX ADMIN — IPRATROPIUM BROMIDE AND ALBUTEROL SULFATE 3 ML: 2.5; .5 SOLUTION RESPIRATORY (INHALATION) at 04:07

## 2021-01-01 RX ADMIN — APIXABAN 5 MG: 5 TABLET, FILM COATED ORAL at 08:44

## 2021-01-01 RX ADMIN — PREDNISONE 30 MG: 20 TABLET ORAL at 10:25

## 2021-01-01 RX ADMIN — MORPHINE SULFATE 10 MG: 100 SOLUTION ORAL at 17:21

## 2021-01-01 RX ADMIN — SUCRALFATE 1 G: 1 TABLET ORAL at 11:46

## 2021-01-01 RX ADMIN — SUCRALFATE 1 G: 1 TABLET ORAL at 20:14

## 2021-01-01 RX ADMIN — CEFTRIAXONE SODIUM 1 G: 1 INJECTION, SOLUTION INTRAVENOUS at 09:26

## 2021-01-01 RX ADMIN — PREDNISONE 30 MG: 20 TABLET ORAL at 08:19

## 2021-01-01 RX ADMIN — PANTOPRAZOLE SODIUM 40 MG: 40 TABLET, DELAYED RELEASE ORAL at 06:28

## 2021-01-01 RX ADMIN — LEVOTHYROXINE SODIUM 50 MCG: 50 TABLET ORAL at 05:29

## 2021-01-01 RX ADMIN — GUAIFENESIN 1200 MG: 600 TABLET, EXTENDED RELEASE ORAL at 10:27

## 2021-01-01 RX ADMIN — APIXABAN 5 MG: 5 TABLET, FILM COATED ORAL at 21:12

## 2021-01-01 RX ADMIN — IPRATROPIUM BROMIDE AND ALBUTEROL SULFATE 3 ML: 2.5; .5 SOLUTION RESPIRATORY (INHALATION) at 11:36

## 2021-01-01 RX ADMIN — LORAZEPAM 0.5 MG: 2 INJECTION INTRAMUSCULAR; INTRAVENOUS at 12:26

## 2021-01-01 NOTE — ED PROVIDER NOTES
MD ATTESTATION NOTE    The GEETHA and I have discussed this patient's history, physical exam, and treatment plan.  I have reviewed the documentation and personally had a face to face interaction with the patient. I affirm the documentation and agree with the treatment and plan.  The attached note describes my personal findings.    89-year-old female with history of chronic bronchitis.  She presents with increasing shortness of breath, productive cough, no fever.    She is nontoxic-appearing, but is short of breath and wheezing.  Chest x-ray shows no acute infection, Covid is negative, she has a slight leukocytosis but normal procalcitonin and cardiac enzymes.    ABG shows PO2 of 58 on room air with normal CO2 and pH.  She has got some steroids and treatments in the ED and is somewhat better but still symptomatic.  I spoke with Dr. Gonsalez from [facility] who agrees to admit the patient to a telemetry bed for further.    Patient wore surgical mask and I wore full, covered appropriate enhanced PPE for the entire encounter including eye protection.    Final diagnoses:   COPD exacerbation (CMS/[facility])   Acute on chronic respiratory failure with hypoxia (CMS/[facility])     Admit to telemetry     Bro Shetty MD  12/31/20 1923

## 2021-01-01 NOTE — PLAN OF CARE
Goal Outcome Evaluation:  Plan of Care Reviewed With: patient  Progress: no change  Outcome Summary: Pt admitted from home with COPD exac. Pt reports she lives with her granddaughter and ambulates with a Rwx. Pt presents with generalized weakness and deconditioning, decrased endurance, impaired balance and difficulty walking. Pt would benefit form PT to address these impairments and work towards outlined goals. Pt may benefit from SNF placement.  Patient was intermittently wearing a face mask during this therapy encounter. Therapist used appropriate personal protective equipment including eye protection, mask, and gloves.  Mask used was standard procedure mask. Appropriate PPE was worn during the entire therapy session. Hand hygiene was completed before and after therapy session. Patient is not in enhanced droplet precautions.

## 2021-01-01 NOTE — THERAPY EVALUATION
Patient Name: Eduarda Lopez  : 1931    MRN: [id number]                              Today's Date: 2021       Admit Date: 2020    Visit Dx:     ICD-10-CM ICD-9-CM   1. COPD exacerbation (CMS/[facility])  J44.1 491.21   2. Acute on chronic respiratory failure with hypoxia (CMS/[facility])  J96.21 518.84     799.02     Patient Active Problem List   Diagnosis   • S/P hip hemiarthroplasty   • Dislocation of hip prosthesis (CMS/[facility])   • Sepsis without acute organ dysfunction (CMS/[facility])   • Acute UTI (urinary tract infection)   • Hyponatremia   • Acquired hypothyroidism   • Chronic diastolic CHF (congestive heart failure) (CMS/[facility])   • Enteritis due to Norovirus   • Coronary artery disease   • Spleen hematoma   • Anemia   • RBBB   • PAF (paroxysmal atrial fibrillation) (CMS/[facility])   • Chest pain, atypical   • Chronic obstructive pulmonary disease with acute exacerbation (CMS/[facility])   • Nausea & vomiting   • COPD exacerbation (CMS/[facility])   • Hypokalemia   • Chronic anticoagulation   • Hypoxia   • Chronic respiratory failure with hypoxia (CMS/[facility])   • Elevated d-dimer   • Pressure injury of coccygeal region, stage 2 (CMS/[facility])     Past Medical History:   Diagnosis Date   • Arthritis    • Asthma    • CHF (congestive heart failure) (CMS/[facility])    • Disease of thyroid gland    • Fracture of hip (CMS/[facility])    • Hyperlipidemia    • Hypertension    • Thyroid disease      Past Surgical History:   Procedure Laterality Date   • CHOLECYSTECTOMY     • HIP SURGERY     • HYSTERECTOMY     • JOINT REPLACEMENT     • SHOULDER SURGERY     • THYROID SURGERY     • TONSILLECTOMY       General Information     Row Name 21 1622          Physical Therapy Time and Intention    Document Type  evaluation  -     Mode of Treatment  individual therapy;physical therapy  -     Row Name 21 1622          General Information    Prior Level of Function  independent:  -     Existing Precautions/Restrictions  fall  -     Barriers to Rehab   previous functional deficit;medically complex  -     Row Name 01/01/21 1622          Living Environment    Lives With  grandchild(rose mary)  -     Row Name 01/01/21 1622          Cognition    Orientation Status (Cognition)  oriented x 3  -     Row Name 01/01/21 1622          Safety Issues, Functional Mobility    Impairments Affecting Function (Mobility)  balance;endurance/activity tolerance;strength;range of motion (ROM)  -       User Key  (r) = Recorded By, (t) = Taken By, (c) = Cosigned By    Initials Name Provider Type    Vicky Henry, PT Physical Therapist        Mobility     Row Name 01/01/21 1623          Bed Mobility    Comment (Bed Mobility)  up in chair  -     Row Name 01/01/21 1623          Bed-Chair Transfer    Bed-Chair [place] (Transfers)  minimum assist (75% patient effort)  -     Assistive Device (Bed-Chair Transfers)  walker, front-wheeled  -     Row Name 01/01/21 1623          Sit-Stand Transfer    Sit-Stand [place] (Transfers)  minimum assist (75% patient effort)  -     Assistive Device (Sit-Stand Transfers)  walker, front-wheeled  -     Row Name 01/01/21 1623          Gait/Stairs (Locomotion)    [place] Level (Gait)  minimum assist (75% patient effort)  -     Assistive Device (Gait)  walker, front-wheeled  -     Distance in Feet (Gait)  10 ft  -     Deviations/Abnormal Patterns (Gait)  antalgic;latrell decreased;gait speed decreased  -     Bilateral Gait Deviations  heel strike decreased;forward flexed posture  -     Comment (Gait/Stairs)  distance limited by fatigue and SOA  -       User Key  (r) = Recorded By, (t) = Taken By, (c) = Cosigned By    Initials Name Provider Type    Vicky Henry, PT Physical Therapist        Obj/Interventions     Row Name 01/01/21 1624          Range of Motion Comprehensive    Comment, General Range of Motion  BLE limited 25%  -     Row Name 01/01/21 1624          Strength Comprehensive (MMT)     Comment, General Manual Muscle Testing (MMT) Assessment  BLE at least 3/5  -     Row Name 01/01/21 1624          Motor Skills    Therapeutic Exercise  -- AP, LAq x 10 reps  -     Row Name 01/01/21 1624          Balance    Balance Assessment  sitting static balance;sitting dynamic balance;standing static balance;standing dynamic balance  -KH     Static Sitting Balance  WFL  -KH     Dynamic Sitting Balance  WFL  -KH     Static Standing Balance  mild impairment  -KH     Dynamic Standing Balance  mild impairment  -       User Key  (r) = Recorded By, (t) = Taken By, (c) = Cosigned By    Initials Name Provider Type    Vicky Henry, PT Physical Therapist        Goals/Plan     Row Name 01/01/21 1628          Bed Mobility Goal 1 (PT)    Activity/Assistive Device (Bed Mobility Goal 1, PT)  bed mobility activities, all  -KH     [place] Level/Cues Needed (Bed Mobility Goal 1, PT)  minimum assist (75% or more patient effort)  -KH     Time Frame (Bed Mobility Goal 1, PT)  1 week  -     Row Name 01/01/21 1628          Transfer Goal 1 (PT)    Activity/Assistive Device (Transfer Goal 1, PT)  transfers, all;walker, rolling  -KH     [place] Level/Cues Needed (Transfer Goal 1, PT)  contact guard assist  -KH     Time Frame (Transfer Goal 1, PT)  1 week  -     Row Name 01/01/21 1628          Gait Training Goal 1 (PT)    Activity/Assistive Device (Gait Training Goal 1, PT)  gait (walking locomotion);walker, rolling  -KH     [place] Level (Gait Training Goal 1, PT)  contact guard assist  -KH     Distance (Gait Training Goal 1, PT)  75 ft  -KH     Time Frame (Gait Training Goal 1, PT)  1 week  -     Row Name 01/01/21 1628          Patient Education Goal (PT)    Activity (Patient Education Goal, PT)  HEP  -KH     [place]/Cues/Accuracy (Memory Goal 2, PT)  demonstrates adequately  -KH     Time Frame (Patient Education Goal, PT)  1 week  -       User Key  (r) = Recorded By, (t) = Taken By, (c) =  Cosigned By    Initials Name Provider Type    Vicky Henry, PT Physical Therapist        Clinical Impression     Row Name 01/01/21 1625          Pain    Additional Documentation  Pain Scale: Numbers Pre/Post-Treatment (Group)  -     Row Name 01/01/21 1625          Pain Scale: Numbers Pre/Post-Treatment    Pretreatment Pain Rating  0/10 - no pain  -     Posttreatment Pain Rating  0/10 - no pain  -     Row Name 01/01/21 1625          Plan of Care Review    Plan of Care Reviewed With  patient  -     Outcome Summary  Pt admitted from home with COPD exac. Pt reports she lives with her granddaughter and ambulates with a Rwx. Pt presents with generalized weakness and deconditioning, decrased endurance, impaired balance and difficulty walking. Pt would benefit form PT to address these impairments and work towards outlined goals. Pt may benefit from SNF placement.  -     Row Name 01/01/21 1625          Therapy Assessment/Plan (PT)    Patient/Family Therapy Goals Statement (PT)  pt wants to return home with graddaughter, but doesn't believe she is ready to do so yet  -     Rehab Potential (PT)  good, to achieve stated therapy goals  -     Criteria for Skilled Interventions Met (PT)  meets criteria  -PAVITHRA     Row Name 01/01/21 1625          Vital Signs    O2 Delivery Pre Treatment  supplemental O2  -     O2 Delivery Intra Treatment  supplemental O2  -     O2 Delivery Post Treatment  supplemental O2  -     Row Name 01/01/21 1625          Positioning and Restraints    Pre-Treatment Position  sitting in chair/recliner  -     Post Treatment Position  chair  -KH     In Chair  reclined;call light within reach;encouraged to call for assist;exit alarm on;notified ns  -       User Key  (r) = Recorded By, (t) = Taken By, (c) = Cosigned By    Initials Name Provider Type    Vicky Henry, PT Physical Therapist        Outcome Measures     Row Name 01/01/21 1624          How much help from  another person do you currently need...    Turning from your back to your side while in flat bed without using bedrails?  3  -KH     Moving from lying on back to sitting on the side of a flat bed without bedrails?  2  -KH     Moving to and from a bed to a chair (including a wheelchair)?  3  -KH     Standing up from a chair using your arms (e.g., wheelchair, bedside chair)?  3  -KH     Climbing 3-5 steps with a railing?  1  -KH     To walk in hospital room?  3  -KH     AM-PAC 6 Clicks Score (PT)  15  -     Row Name 01/01/21 1629          Functional Assessment    Outcome Measure Options  AM-PAC 6 Clicks Basic Mobility (PT)  -       User Key  (r) = Recorded By, (t) = Taken By, (c) = Cosigned By    Initials Name Provider Type    Vicky Henry, PT Physical Therapist        Physical Therapy Education                 Title: PT OT SLP Therapies (Done)     Topic: Physical Therapy (Done)     Point: Mobility training (Done)     Learning Progress Summary           Patient Acceptance, E,D, VU,NR by PAVITHRA at 1/1/2021 1629                   Point: Home exercise program (Done)     Learning Progress Summary           Patient Acceptance, E,D, VU,NR by  at 1/1/2021 1629                   Point: Body mechanics (Done)     Learning Progress Summary           Patient Acceptance, E,D, VU,NR by PAVITHRA at 1/1/2021 1629                   Point: Precautions (Done)     Learning Progress Summary           Patient Acceptance, E,D, VU,NR by  at 1/1/2021 1629                               User Key     Initials Effective Dates Name Provider Type [facility]    PAVITHRA 02/05/19 -  Vicky Callahan, PT Physical Therapist PT              PT Recommendation and Plan     Plan of Care Reviewed With: patient  Outcome Summary: Pt admitted from home with COPD exac. Pt reports she lives with her granddaughter and ambulates with a Rwx. Pt presents with generalized weakness and deconditioning, decrased endurance, impaired balance and difficulty  walking. Pt would benefit form PT to address these impairments and work towards outlined goals. Pt may benefit from SNF placement.     Time Calculation:   PT Charges     Row Name 01/01/21 1630             Time Calculation    Start Time  1120  -      Stop Time  1149  -      Time Calculation (min)  29 min  -      PT Received On  01/01/21  -      PT - Next Appointment  01/02/21  -      PT Goal Re-Cert Due Date  01/08/21  -         Time Calculation- PT    Total Timed Code Minutes- PT  12 minute(s)  -        User Key  (r) = Recorded By, (t) = Taken By, (c) = Cosigned By    Initials Name Provider Type    Vicky Henry, PT Physical Therapist        Therapy Charges for Today     Code Description Service Date Service Provider Modifiers Qty    48238028178 HC PT EVAL MOD COMPLEXITY 2 1/1/2021 Vicky Callahan, PT GP 1    14103138227 HC PT THER PROC EA 15 MIN 1/1/2021 Vicky Callahan, PT GP 1          PT G-Codes  Outcome Measure Options: AM-PAC 6 Clicks Basic Mobility (PT)  AM-PAC 6 Clicks Score (PT): 15    Vicky Callahan PT  1/1/2021

## 2021-01-01 NOTE — PROGRESS NOTES
"   LOS: 2 days   Patient Care Team:  Lisseth Vasquez APRN as PCP - General (Family Medicine)  Inocencio Duff MD as Consulting Physician (Pulmonary Disease)    Chief Complaint: cough    Subjective     Pt feeling better this AM. Tolerating diet. Voiding well. Was able to sleep a little better last night. Less irritable today. Still coughing but feels she is breathing better.       Subjective:  Symptoms:  Improved.  She reports cough and weakness.  No shortness of breath, malaise, chest pain, headache, chest pressure, anorexia, diarrhea or anxiety.    Diet:  Adequate intake.  No nausea or vomiting.    Activity level: Impaired due to weakness.    Pain:  She reports no pain.        History taken from: patient chart    Objective     Vital Signs  Temp:  [97.7 °F (36.5 °C)-98.9 °F (37.2 °C)] 98.5 °F (36.9 °C)  Heart Rate:  [82-94] 94  Resp:  [16-20] 16  BP: (121-151)/(69-88) 143/88    Objective:  General Appearance:  Comfortable, in no acute distress and ill-appearing (chronically).    Vital signs: (most recent): Blood pressure 143/88, pulse 94, temperature 98.5 °F (36.9 °C), temperature source Oral, resp. rate 16, height 172.7 cm (67.99\"), weight 81.5 kg (179 lb 10.8 oz), SpO2 94 %.  Vital signs are normal.  No fever.    Output: Producing urine and producing stool.    HEENT: Normal HEENT exam.    Lungs:  Normal effort and normal respiratory rate.  She is not in respiratory distress.  No stridor.  There are wheezes (global end-exp, prolonged exp phase).  No rales or rhonchi.    Heart: Normal rate.  Regular rhythm.    Abdomen: Abdomen is soft.  Bowel sounds are normal.   There is no abdominal tenderness.     Extremities: There is no dependent edema.    Pulses: Distal pulses are intact.    Neurological: Patient is alert and oriented to person, place and time.  Normal strength.  Patient has normal muscle tone.  ([place]).    Pupils:  Pupils are equal.   Skin:  Warm and dry.  No rash.             Results Review:     I " reviewed the patient's new clinical results.  I reviewed the patient's new imaging results and agree with the interpretation.  I reviewed the patient's other test results and agree with the interpretation  I personally viewed and interpreted the patient's EKG/Telemetry data  Discussed with pt    Results from last 7 days   Lab Units 01/01/21  0515 12/31/20  0337 12/30/20  1833   WBC 10*3/mm3 10.13 5.21 11.06*   HEMOGLOBIN g/dL 11.9* 12.8 12.1   PLATELETS 10*3/mm3 339 310 289     Results from last 7 days   Lab Units 01/01/21 0515 12/31/20  0337 12/30/20  1833   SODIUM mmol/L 129* 129* 131*   POTASSIUM mmol/L 3.9 3.5 3.4*   CHLORIDE mmol/L 93* 92* 92*   CO2 mmol/L 25.6 25.7 28.6   BUN mg/dL 14 10 11   CREATININE mg/dL 0.67 0.76 0.82   CALCIUM mg/dL 9.4 9.3 9.2   MAGNESIUM mg/dL 1.8  --   --    Estimated Creatinine Clearance: 53.4 mL/min (by C-G formula based on SCr of 0.67 mg/dL).    Medication Review: reviewed    Assessment/Plan       COPD exacerbation (CMS/HCC)    Hyponatremia    Acquired hypothyroidism    Chronic diastolic CHF (congestive heart failure) (CMS/HCC)    Coronary artery disease    Anemia    PAF (paroxysmal atrial fibrillation) (CMS/HCC)    Hypokalemia    Chronic anticoagulation    Hypoxia    Chronic respiratory failure with hypoxia (CMS/HCC)    Elevated d-dimer    Pressure injury of coccygeal region, stage 2 (CMS/HCC)          Plan:   (Pleasant 88yo woman with h/o paroxysmal atrial fibrillation, CAD, chronic diastolic CHF, anemia, hypothyroidism, and COPD, who presented to ER with c/o 3d h/o worsening SOA and productive cough. She had run out of nebulizer treatments a few days prior.     Continue Pulmicort and Brovana, scheduled DuoNebs, and IV Solumedrol  Pulm consulted given appearance of CTA chest, appreciate their attention to pt, Zithromax added  RVP ordered  DDimer elevated but no PE on CTA and pt chronically on Eliquis, venous duplex BLEs neg for DVT  Monitor closely for worsening O2 requirement,  currently stable on 3L/min (her baseline requirement is 2L/min)  Monitor Na+ closely, it is stable today  K+ wnl again this AM after oral replacement, Mg++ level 1.8, continue daily KCl   Wound RN following for pressure changes to coccyx  Sugars high with steroids, A1c 5.6    Eliquis restarted and should suffice for DVT ppx  Full code confirmed  Dispo: TBD, will ask PT to see, ambulate pt, pt unlikely to agree to any sort of dc to rehab facility).       Catracho Del Real MD  01/01/21  08:48 EST    Time: 25min

## 2021-01-01 NOTE — PROGRESS NOTES
"  Daily Progress Note.   [address]  1/1/2021    Patient:  Name:  Eduarda Lopez  MRN:  [id number]  12/27/1931  89 y.o.  female         CC: soa    Interval History/ROS:   Patient asleep upon entry to room however she awakens.  She says her breathing is better today.  No worsening cough or though she still has some.  No hemoptysis or chest pain.  She says she was finally able to get some sleep after those darn steroids.      PMFSSH: no change    Physical Exam:  /88 (BP Location: Right arm, Patient Position: Lying)   Pulse 94   Temp 98.5 °F (36.9 °C) (Oral)   Resp 16   Ht 172.7 cm (67.99\")   Wt 81.5 kg (179 lb 10.8 oz)   SpO2 94%   BMI 27.33 kg/m²   Body mass index is 27.33 kg/m².    Intake/Output Summary (Last 24 hours) at 1/1/2021 0830  Last data filed at 1/1/2021 0600  Gross per 24 hour   Intake 1200 ml   Output --   Net 1200 ml     General appearance: calm, conversant   Eyes: anicteric sclerae, moist conjunctivae; +right lid-lag; PERRLA  HENT: Atraumatic; oropharynx clear with moist mucous membranes and no mucosal ulcerations; normal hard and soft palate  Neck: Trachea midline; FROM, supple, no thyromegaly or lymphadenopathy  Lungs: Bilateral  air entry with mild int rhonchi and less wheezing bilaterally, with calm respiratory effort and no intercostal retractions  CV: RRR, no MRGs   Abdomen: Obese soft  Extremities: No peripheral edema or extremity lymphadenopathy  Skin: Normal temperature, turgor and texture; no rash, ulcers or subcutaneous nodules  Psych: Intermittently agitated affect, alert   Neuro +[place] speech intact moves all extremities     Data Review:  Notable Labs:  Results from last 7 days   Lab Units 01/01/21  0515 12/31/20  0337 12/30/20  1833   WBC 10*3/mm3 10.13 5.21 11.06*   HEMOGLOBIN g/dL 11.9* 12.8 12.1   PLATELETS 10*3/mm3 339 310 289     Results from last 7 days   Lab Units 01/01/21  0515 12/31/20  0337 12/30/20  1833   SODIUM mmol/L 129* 129* 131* "   POTASSIUM mmol/L 3.9 3.5 3.4*   CHLORIDE mmol/L 93* 92* 92*   CO2 mmol/L 25.6 25.7 28.6   BUN mg/dL 14 10 11   CREATININE mg/dL 0.67 0.76 0.82   GLUCOSE mg/dL 141* 215* 106*   CALCIUM mg/dL 9.4 9.3 9.2   MAGNESIUM mg/dL 1.8  --   --    Estimated Creatinine Clearance: 53.4 mL/min (by C-G formula based on SCr of 0.67 mg/dL).    Results from last 7 days   Lab Units 01/01/21  0515 12/31/20  0337 12/30/20  1833   AST (SGOT) U/L 23 17 17   ALT (SGPT) U/L 9 10 7   PROCALCITONIN ng/mL 0.06  --  0.08   D DIMER QUANT MCGFEU/mL  --  0.89*  --    PLATELETS 10*3/mm3 339 310 289       Results from last 7 days   Lab Units 12/30/20  2020   PH, ARTERIAL pH units 7.465*   PCO2, ARTERIAL mm Hg 35.6   PO2 ART mm Hg 58.3*   HCO3 ART mmol/L 25.7       Imaging:  Reviewed chest images personally from past 3 days    Scheduled meds:    apixaban, 5 mg, Oral, Q12H  arformoterol, 15 mcg, Nebulization, BID - RT  azithromycin, 500 mg, Oral, Q24H  budesonide, 1 mg, Nebulization, Daily - RT  citalopram, 20 mg, Oral, Daily  dilTIAZem CD, 120 mg, Oral, Q24H  furosemide, 40 mg, Oral, Daily  guaiFENesin, 1,200 mg, Oral, Q12H  ipratropium-albuterol, 3 mL, Nebulization, 4x Daily - RT  levothyroxine, 50 mcg, Oral, Q AM  melatonin, 5 mg, Oral, Nightly  methylPREDNISolone sodium succinate, 40 mg, Intravenous, Q12H  montelukast, 10 mg, Oral, Nightly  multivitamin, 1 tablet, Oral, Daily  oxybutynin XL, 10 mg, Oral, Daily  pantoprazole, 40 mg, Oral, Q AM  sucralfate, 1 g, Oral, 4x Daily AC & at Bedtime        ASSESSMENT  /  PLAN:  Exacerbation of COPD  Pneumonia-suspect this is viral or atypical  Paroxysmal atrial fibrillation  Chronic diastolic congestive heart failure  Paroxysmal atrial fibrillation and coagulated  Coronary artery disease  Hypothyroidism         Continue to reduce IV methylprednisolone  Continue budesonide nebulizer as well as Brovana nebulizer  Continue scheduled duo nebs she has been tolerating these.  Send respiratory viral panel to see  for atypicals and non-Covid respiratory viruses as well.  Cont azithromycin    Discussed plan of care with bedside nurse.    Sharad Green MD  [facility]  01/01/21  08:30 EST

## 2021-01-01 NOTE — PLAN OF CARE
Goal Outcome Evaluation:  Plan of Care Reviewed With: patient  Pt. Is stable.  Pt. Continues on 3lpm via NC.  Pleasant.  Incontinent of B & B.  IV infiltrated replaced to left hand, it was pull out by patient, IV team notified.  Will continue to monitor.

## 2021-01-02 NOTE — PLAN OF CARE
Goal Outcome Evaluation:  Plan of Care Reviewed With: patient  Progress: improving  Outcome Summary: Pt was agreeable to therapy, but c/o painin both knees and feeling weak. Pt was able to walk a few steps forward/backwards twice with Min A and Rwx. She was SOA and fatigued quickly, requiring seated rest break.  Patient was intermittently wearing a face mask during this therapy encounter. Therapist used appropriate personal protective equipment including eye protection, mask, and gloves.  Mask used was standard procedure mask. Appropriate PPE was worn during the entire therapy session. Hand hygiene was completed before and after therapy session. Patient is not in enhanced droplet precautions.

## 2021-01-02 NOTE — PROGRESS NOTES
"   LOS: 3 days   Patient Care Team:  Lisseth Vasquez APRN as PCP - General (Family Medicine)  Inocencio Duff MD as Consulting Physician (Pulmonary Disease)    Chief Complaint: congestion  Subjective     Pt feeling no better this AM. Tolerating diet. Voiding well. Was able to sleep a little better last night. Less irritable today. C/o a lot of chest congestion. Using IS as directed.       Subjective:  Symptoms:  Stable.  She reports cough and weakness.  No shortness of breath, malaise, chest pain, headache, chest pressure, anorexia, diarrhea or anxiety.    Diet:  Adequate intake.  No nausea or vomiting.    Activity level: Impaired due to weakness.    Pain:  She reports no pain.        History taken from: patient chart    Objective     Vital Signs  Temp:  [97.3 °F (36.3 °C)-98.8 °F (37.1 °C)] 98.1 °F (36.7 °C)  Heart Rate:  [77-95] 94  Resp:  [16-18] 16  BP: (108-152)/(65-89) 152/89    Objective:  General Appearance:  Comfortable, in no acute distress and ill-appearing (chronically).    Vital signs: (most recent): Blood pressure 152/89, pulse 94, temperature 98.1 °F (36.7 °C), temperature source Oral, resp. rate 16, height 172.7 cm (67.99\"), weight 81.5 kg (179 lb 10.8 oz), SpO2 95 %.  Vital signs are normal.  No fever.    Output: Producing urine and producing stool.    HEENT: Normal HEENT exam.    Lungs:  Normal effort and normal respiratory rate.  She is not in respiratory distress.  No stridor.  There are wheezes (global end-exp, prolonged exp phase).  No rales or rhonchi.    Heart: Normal rate.  Regular rhythm.    Abdomen: Abdomen is soft.  Bowel sounds are normal.   There is no abdominal tenderness.     Extremities: There is no dependent edema.    Pulses: Distal pulses are intact.    Neurological: Patient is alert and oriented to person, place and time.  Normal strength.  Patient has normal muscle tone.  (Habematolel).    Pupils:  Pupils are equal.   Skin:  Warm and dry.  No rash.             Results Review:  "    I reviewed the patient's new clinical results.  I reviewed the patient's other test results and agree with the interpretation  Discussed with pt    Results from last 7 days   Lab Units 01/01/21  0515 12/31/20  0337 12/30/20  1833   WBC 10*3/mm3 10.13 5.21 11.06*   HEMOGLOBIN g/dL 11.9* 12.8 12.1   PLATELETS 10*3/mm3 339 310 289     Results from last 7 days   Lab Units 01/01/21  0515 12/31/20  0337 12/30/20  1833   SODIUM mmol/L 129* 129* 131*   POTASSIUM mmol/L 3.9 3.5 3.4*   CHLORIDE mmol/L 93* 92* 92*   CO2 mmol/L 25.6 25.7 28.6   BUN mg/dL 14 10 11   CREATININE mg/dL 0.67 0.76 0.82   CALCIUM mg/dL 9.4 9.3 9.2   MAGNESIUM mg/dL 1.8  --   --    Estimated Creatinine Clearance: 53.4 mL/min (by C-G formula based on SCr of 0.67 mg/dL).    Medication Review: reviewed    Assessment/Plan       COPD exacerbation (CMS/HCC)    Hyponatremia    Acquired hypothyroidism    Chronic diastolic CHF (congestive heart failure) (CMS/HCC)    Coronary artery disease    Anemia    PAF (paroxysmal atrial fibrillation) (CMS/HCC)    Hypokalemia    Chronic anticoagulation    Hypoxia    Chronic respiratory failure with hypoxia (CMS/HCC)    Elevated d-dimer    Pressure injury of coccygeal region, stage 2 (CMS/HCC)          Plan:   (Pleasant 89yo woman with h/o paroxysmal atrial fibrillation, CAD, chronic diastolic CHF, anemia, hypothyroidism, and COPD/CHRF, who presented to ER with c/o 3d h/o worsening SOA and productive cough. She had run out of nebulizer treatments a few days prior.     Continue Pulmicort and Brovana, scheduled DuoNebs   IV Solumedrol changed to po Prednisone today  Pulm consulted given appearance of CTA chest, appreciate their attention to pt   Continue 5d course of Zithromax  Continue IS and add OPEP  RVP neg, PCT wnl  DDimer elevated but no PE on CTA and pt chronically on Eliquis, venous duplex BLEs neg for DVT  Monitor closely for worsening O2 requirement, currently stable on 2L/min (that is her baseline  requirement)  Monitor Na+ closely, it is stable today at 129  K+ wnl again this AM after oral replacement, Mg++ level 1.8, continue daily KCl   Wound RN following for pressure changes to coccyx  Sugars high with steroids, A1c 5.6    Eliquis restarted and should suffice for DVT ppx  Full code confirmed  Dispo: TBD, PT working with pt, pt initially unlikely to agree to any sort of dc to rehab facility, but now she is concerned that she isn't strong enough to go home--even with the assistance of her granddtr).       Catracho Del Real MD  01/02/21  08:43 EST    Time: 20min

## 2021-01-02 NOTE — PROGRESS NOTES
"  Daily Progress Note.   [address]  1/2/2021    Patient:  Name:  Eduarda Lopez  MRN:  [id number]  12/27/1931  89 y.o.  female         CC: soa COPD acute exacerbation    Interval History/ROS:   Patient states that she is still short of breath when she exerts herself she however can breathe at rest.  She feels better than she did when she arrived however still not back to her baseline yet.  She was able to get some sleep last night.  No chest pain no pleuritic chest pain no hemoptysis no diarrhea no loss of taste or smell.  She is hard of hearing at her baseline.      PMFSSH: no change    Physical Exam:  /89 (BP Location: Right arm, Patient Position: Lying)   Pulse 94   Temp 98.1 °F (36.7 °C) (Oral)   Resp 16   Ht 172.7 cm (67.99\")   Wt 81.5 kg (179 lb 10.8 oz)   SpO2 95%   BMI 27.33 kg/m²   Body mass index is 27.33 kg/m².  No intake or output data in the 24 hours ending 01/02/21 0821  General appearance: calm, conversant   Eyes: anicteric sclerae, moist conjunctivae; +right lid-lag; PERRLA  HENT: Atraumatic; oropharynx clear with moist mucous membranes and no mucosal ulcerations; normal hard and soft palate  Neck: Trachea midline; FROM, supple, no thyromegaly or lymphadenopathy  Lungs: Bilateral  air entry with int rhonchi and wheezing bilaterally, with calm respiratory effort and no intercostal retractions  CV: RRR, no MRGs   Abdomen: Obese soft  Extremities: No peripheral edema or extremity lymphadenopathy  Skin: Normal temperature, turgor and texture; no rash, ulcers or subcutaneous nodules  Psych: calm affect, alert   Neuro +[place] speech intact moves all extremities     Data Review:  Notable Labs:  Results from last 7 days   Lab Units 01/01/21  0515 12/31/20  0337 12/30/20  1833   WBC 10*3/mm3 10.13 5.21 11.06*   HEMOGLOBIN g/dL 11.9* 12.8 12.1   PLATELETS 10*3/mm3 339 310 289     Results from last 7 days   Lab Units 01/01/21  0515 12/31/20  0337 12/30/20  1833   SODIUM mmol/L " 129* 129* 131*   POTASSIUM mmol/L 3.9 3.5 3.4*   CHLORIDE mmol/L 93* 92* 92*   CO2 mmol/L 25.6 25.7 28.6   BUN mg/dL 14 10 11   CREATININE mg/dL 0.67 0.76 0.82   GLUCOSE mg/dL 141* 215* 106*   CALCIUM mg/dL 9.4 9.3 9.2   MAGNESIUM mg/dL 1.8  --   --    Estimated Creatinine Clearance: 53.4 mL/min (by C-G formula based on SCr of 0.67 mg/dL).    Results from last 7 days   Lab Units 01/01/21  0515 12/31/20  0337 12/30/20  1833   AST (SGOT) U/L 23 17 17   ALT (SGPT) U/L 9 10 7   PROCALCITONIN ng/mL 0.06  --  0.08   D DIMER QUANT MCGFEU/mL  --  0.89*  --    PLATELETS 10*3/mm3 339 310 289       Results from last 7 days   Lab Units 12/30/20  2020   PH, ARTERIAL pH units 7.465*   PCO2, ARTERIAL mm Hg 35.6   PO2 ART mm Hg 58.3*   HCO3 ART mmol/L 25.7       Imaging:  Reviewed chest images personally from past 3 days    Scheduled meds:    apixaban, 5 mg, Oral, Q12H  arformoterol, 15 mcg, Nebulization, BID - RT  azithromycin, 500 mg, Oral, Q24H  budesonide, 1 mg, Nebulization, Daily - RT  citalopram, 20 mg, Oral, Daily  dilTIAZem CD, 120 mg, Oral, Q24H  furosemide, 40 mg, Oral, Daily  guaiFENesin, 1,200 mg, Oral, Q12H  ipratropium-albuterol, 3 mL, Nebulization, 4x Daily - RT  levothyroxine, 50 mcg, Oral, Q AM  melatonin, 5 mg, Oral, Nightly  montelukast, 10 mg, Oral, Nightly  multivitamin, 1 tablet, Oral, Daily  oxybutynin XL, 10 mg, Oral, Daily  pantoprazole, 40 mg, Oral, Q AM  predniSONE, 20 mg, Oral, Daily With Breakfast  sucralfate, 1 g, Oral, 4x Daily AC & at Bedtime        ASSESSMENT  /  PLAN:  Exacerbation of COPD  Infiltrates - post viral or fluid, complete azithromycin for ae, follow up as outpt  Paroxysmal atrial fibrillation  Chronic diastolic congestive heart failure  Paroxysmal atrial fibrillation and coagulated  Coronary artery disease  Hypothyroidism  Hyponatremia - per primary     IV methylprednisolone stop start pred at 20mg (sig agigtation with steroids)  Continue budesonide nebulizer as well as Brovana  nebulizer  Continue scheduled duo nebs she has been tolerating these.  Cont azithromycin for ae  Will need continued outpatient follow up - she sees Dr CLAIRE In our office      Sharad Green MD  [facility]  01/02/21  08:23 EST

## 2021-01-02 NOTE — PLAN OF CARE
"Patient has some difficulty eating because her dentures broke. She can't afford to get them repaired. Requested soups - this was relayed to dietary. Refuses duo-neb treatments as she states she is allergic. Discussed that the \"tremors\" are a side effect and not an allergy. She still refuses. Continues to have shortness of air.     Goal Outcome Evaluation:  Plan of Care Reviewed With: patient  Progress: improving     "

## 2021-01-02 NOTE — PLAN OF CARE
Goal Outcome Evaluation:  Plan of Care Reviewed With: patient  Progress: improving  Outcome Summary: Pt is stable.  Tylenol given at 2am due to complain of arthritis pain.  Continues with incontinent episodes. Able to voice needs.  Will continue to monitor.

## 2021-01-02 NOTE — THERAPY TREATMENT NOTE
Patient Name: Eduarda Lopez  : 1931    MRN: [id number]                              Today's Date: 2021       Admit Date: 2020    Visit Dx:     ICD-10-CM ICD-9-CM   1. COPD exacerbation (CMS/[facility])  J44.1 491.21   2. Acute on chronic respiratory failure with hypoxia (CMS/[facility])  J96.21 518.84     799.02     Patient Active Problem List   Diagnosis   • S/P hip hemiarthroplasty   • Dislocation of hip prosthesis (CMS/[facility])   • Sepsis without acute organ dysfunction (CMS/[facility])   • Acute UTI (urinary tract infection)   • Hyponatremia   • Acquired hypothyroidism   • Chronic diastolic CHF (congestive heart failure) (CMS/[facility])   • Enteritis due to Norovirus   • Coronary artery disease   • Spleen hematoma   • Anemia   • RBBB   • PAF (paroxysmal atrial fibrillation) (CMS/[facility])   • Chest pain, atypical   • Chronic obstructive pulmonary disease with acute exacerbation (CMS/[facility])   • Nausea & vomiting   • COPD exacerbation (CMS/[facility])   • Hypokalemia   • Chronic anticoagulation   • Hypoxia   • Chronic respiratory failure with hypoxia (CMS/[facility])   • Elevated d-dimer   • Pressure injury of coccygeal region, stage 2 (CMS/[facility])     Past Medical History:   Diagnosis Date   • Arthritis    • Asthma    • CHF (congestive heart failure) (CMS/[facility])    • Disease of thyroid gland    • Fracture of hip (CMS/[facility])    • Hyperlipidemia    • Hypertension    • Thyroid disease      Past Surgical History:   Procedure Laterality Date   • CHOLECYSTECTOMY     • HIP SURGERY     • HYSTERECTOMY     • JOINT REPLACEMENT     • SHOULDER SURGERY     • THYROID SURGERY     • TONSILLECTOMY       General Information     Row Name 21 1236          Physical Therapy Time and Intention    Document Type  therapy note (daily note)  -PAVITHRA     Mode of Treatment  individual therapy;physical therapy  -       User Key  (r) = Recorded By, (t) = Taken By, (c) = Cosigned By    Initials Name Provider Type    Vicky Henry, PT Physical Therapist         Mobility     [facility] Name 01/02/21 1236          Bed Mobility    Bed Mobility  sit-supine;supine-sit-supine  -     Sit-Supine Jenkins (Bed Mobility)  contact guard  -     Supine-Sit-Supine Jenkins (Bed Mobility)  minimum assist (75% patient effort)  -     Assistive Device (Bed Mobility)  bed rails;head of bed elevated  -[facility] Name 01/02/21 1236          Sit-Stand Transfer    Sit-Stand Jenkins (Transfers)  minimum assist (75% patient effort)  -     Assistive Device (Sit-Stand Transfers)  walker, front-wheeled  -[facility] Name 01/02/21 1236          Gait/Stairs (Locomotion)    Jenkins Level (Gait)  minimum assist (75% patient effort)  -     Assistive Device (Gait)  walker, front-wheeled  -     Distance in Feet (Gait)  5 ft forward backward x 2 with seated rest break between  -     Deviations/Abnormal Patterns (Gait)  antalgic;latrell decreased;gait speed decreased  -     Bilateral Gait Deviations  heel strike decreased;forward flexed posture  -       User Key  (r) = Recorded By, (t) = Taken By, (c) = Cosigned By    Initials Name Provider Type    Vicky Henry, NICKY Physical Therapist        Obj/Interventions     [facility] Name 01/02/21 1237          Motor Skills    Therapeutic Exercise  -- BLE AP, LAQ x 10 reps  -       User Key  (r) = Recorded By, (t) = Taken By, (c) = Cosigned By    Initials Name Provider Type    Vicky Henry, NICKY Physical Therapist        Goals/Plan    No documentation.       Clinical Impression     [facility] Name 01/02/21 1237          Pain    Additional Documentation  Pain Scale: Numbers Pre/Post-Treatment (Group)  -KH     Row Name 01/02/21 1237          Pain Scale: Numbers Pre/Post-Treatment    Pretreatment Pain Rating  3/10  -     Posttreatment Pain Rating  4/10  -     Pain Location - Side  Bilateral  -     Pain Location  knee  -     Pain Intervention(s)  Repositioned;Ambulation/increased activity  -[facility] Name 01/02/21 1237           Plan of Care Review    Plan of Care Reviewed With  patient  -     Outcome Summary  Pt was agreeable to therapy, but c/o painin both knees and feeling weak. Pt was able to walk a few steps forward/backwards twice with Min A and Rwx. She was SOA and fatigued quickly, requiring seated rest break.  -     Row Name 01/02/21 1237          Positioning and Restraints    Pre-Treatment Position  in bed  -KH     Post Treatment Position  bed  -KH     In Bed  fowlers;call light within reach;encouraged to call for assist;exit alarm on;notified nsg  -       User Key  (r) = Recorded By, (t) = Taken By, (c) = Cosigned By    Initials Name Provider Type    Vicky Henry PT Physical Therapist        Outcome Measures     Row Name 01/02/21 1240          How much help from another person do you currently need...    Turning from your back to your side while in flat bed without using bedrails?  4  -KH     Moving from lying on back to sitting on the side of a flat bed without bedrails?  3  -KH     Moving to and from a bed to a chair (including a wheelchair)?  3  -KH     Standing up from a chair using your arms (e.g., wheelchair, bedside chair)?  3  -KH     Climbing 3-5 steps with a railing?  1  -KH     To walk in hospital room?  2  -KH     AM-PAC 6 Clicks Score (PT)  16  -     Row Name 01/02/21 1240          Functional Assessment    Outcome Measure Options  AM-PAC 6 Clicks Basic Mobility (PT)  -       User Key  (r) = Recorded By, (t) = Taken By, (c) = Cosigned By    Initials Name Provider Type    Vicky Henry PT Physical Therapist        Physical Therapy Education                 Title: PT OT SLP Therapies (Done)     Topic: Physical Therapy (Done)     Point: Mobility training (Done)     Learning Progress Summary           Patient Acceptance, E, VU,NR by PAVITHRA at 1/2/2021 1240    Acceptance, E,D, VU,NR by PAVITHRA at 1/1/2021 1629                   Point: Home exercise program (Done)     Learning Progress  Summary           Patient Acceptance, E, VU,NR by  at 1/2/2021 1240    Acceptance, E,D, VU,NR by  at 1/1/2021 1629                   Point: Body mechanics (Done)     Learning Progress Summary           Patient Acceptance, E, VU,NR by  at 1/2/2021 1240    Acceptance, E,D, VU,NR by  at 1/1/2021 1629                   Point: Precautions (Done)     Learning Progress Summary           Patient Acceptance, E, VU,NR by  at 1/2/2021 1240    Acceptance, E,D, VU,NR by  at 1/1/2021 1629                               User Key     Initials Effective Dates Name Provider Type Discipline     02/05/19 -  Vicky Callahan PT Physical Therapist PT              PT Recommendation and Plan     Plan of Care Reviewed With: patient  Outcome Summary: Pt was agreeable to therapy, but c/o painin both knees and feeling weak. Pt was able to walk a few steps forward/backwards twice with Min A and Rwx. She was SOA and fatigued quickly, requiring seated rest break.     Time Calculation:   PT Charges     Row Name 01/02/21 1241             Time Calculation    Start Time  1056  -      Stop Time  1111  -      Time Calculation (min)  15 min  -      PT Received On  01/02/21  -      PT - Next Appointment  01/03/21  -         Time Calculation- PT    Total Timed Code Minutes- PT  15 minute(s)  -        User Key  (r) = Recorded By, (t) = Taken By, (c) = Cosigned By    Initials Name Provider Type     Vicky Callahan PT Physical Therapist        Therapy Charges for Today     Code Description Service Date Service Provider Modifiers Qty    97906152851 HC PT EVAL MOD COMPLEXITY 2 1/1/2021 Vicky Callahan, PT GP 1    13697556569 HC PT THER PROC EA 15 MIN 1/1/2021 Vicky Callahan, PT GP 1    29936092068 HC PT THER PROC EA 15 MIN 1/2/2021 Vicky Callahan, PT GP 1          PT G-Codes  Outcome Measure Options: AM-PAC 6 Clicks Basic Mobility (PT)  AM-PAC 6 Clicks Score (PT): 16    Vicky Amador  Sindy, PT  1/2/2021

## 2021-01-03 PROBLEM — M17.0 PRIMARY OSTEOARTHRITIS OF BOTH KNEES: Chronic | Status: ACTIVE | Noted: 2021-01-01

## 2021-01-03 PROBLEM — N76.0 ACUTE VAGINITIS: Status: ACTIVE | Noted: 2021-01-01

## 2021-01-03 NOTE — PROGRESS NOTES
"  Daily Progress Note.   [address]  1/3/2021    Patient:  Name:  Eduarda Lopez  MRN:  [id number]  12/27/1931  89 y.o.  female         CC: soa COPD acute exacerbation    Interval History/ROS:   She seems quite worked up this morning.  She says her knees hurt when asked her about her breathing.  She seems a little bit more anxious this morning and upset.  Apparently she had been refusing some of the duo nebs but then also says to me that she needs breathing treatments every 3-4 hours.        PMFSSH: no change    Physical Exam:  /86 (BP Location: Right arm, Patient Position: Lying)   Pulse 76   Temp 98.4 °F (36.9 °C) (Oral)   Resp 20   Ht 172.7 cm (67.99\")   Wt 81.5 kg (179 lb 10.8 oz)   SpO2 94%   BMI 27.33 kg/m²   Body mass index is 27.33 kg/m².    Intake/Output Summary (Last 24 hours) at 1/3/2021 0838  Last data filed at 1/3/2021 0345  Gross per 24 hour   Intake --   Output 1400 ml   Net -1400 ml     General appearance: agitated, conversant   Eyes: anicteric sclerae, moist conjunctivae; +right lid-lag; PERRLA  HENT: Atraumatic; oropharynx clear with moist mucous membranes and no mucosal ulcerations; normal hard and soft palate  Neck: Trachea midline; FROM, supple, no thyromegaly or lymphadenopathy  Lungs: Bilateral  air entry with int rhonchi and wheezing bilaterally, with calm respiratory effort and no intercostal retractions  CV: RRR, no MRGs   Abdomen: Obese soft  Extremities: No peripheral edema or extremity lymphadenopathy  Skin: Normal temperature, turgor and texture; no rash, ulcers or subcutaneous nodules  Psych: agitated affect, alert   Neuro +[place] speech intact moves all extremities     Data Review:  Notable Labs:  Results from last 7 days   Lab Units 01/02/21  0846 01/01/21  0515 12/31/20  0337 12/30/20  1833   WBC 10*3/mm3 12.12* 10.13 5.21 11.06*   HEMOGLOBIN g/dL 12.6 11.9* 12.8 12.1   PLATELETS 10*3/mm3 386 339 310 289     Results from last 7 days   Lab Units " 01/02/21  0846 01/01/21  0515 12/31/20  0337 12/30/20  1833   SODIUM mmol/L 128* 129* 129* 131*   POTASSIUM mmol/L 3.8 3.9 3.5 3.4*   CHLORIDE mmol/L 88* 93* 92* 92*   CO2 mmol/L 26.4 25.6 25.7 28.6   BUN mg/dL 20 14 10 11   CREATININE mg/dL 0.79 0.67 0.76 0.82   GLUCOSE mg/dL 144* 141* 215* 106*   CALCIUM mg/dL 9.5 9.4 9.3 9.2   MAGNESIUM mg/dL  --  1.8  --   --    Estimated Creatinine Clearance: 53.4 mL/min (by C-G formula based on SCr of 0.79 mg/dL).    Results from last 7 days   Lab Units 01/02/21  0846 01/01/21  0515 12/31/20  0337 12/30/20  1833   AST (SGOT) U/L 31 23 17 17   ALT (SGPT) U/L 17 9 10 7   PROCALCITONIN ng/mL  --  0.06  --  0.08   D DIMER QUANT MCGFEU/mL  --   --  0.89*  --    PLATELETS 10*3/mm3 386 339 310 289       Results from last 7 days   Lab Units 12/30/20  2020   PH, ARTERIAL pH units 7.465*   PCO2, ARTERIAL mm Hg 35.6   PO2 ART mm Hg 58.3*   HCO3 ART mmol/L 25.7       Imaging:  Reviewed chest images personally from past 3 days    Scheduled meds:    apixaban, 5 mg, Oral, Q12H  arformoterol, 15 mcg, Nebulization, BID - RT  azithromycin, 250 mg, Oral, Q24H  budesonide, 1 mg, Nebulization, Daily - RT  citalopram, 20 mg, Oral, Daily  dilTIAZem CD, 120 mg, Oral, Q24H  furosemide, 40 mg, Oral, Daily  guaiFENesin, 1,200 mg, Oral, Q12H  ipratropium-albuterol, 3 mL, Nebulization, 4x Daily - RT  levothyroxine, 50 mcg, Oral, Q AM  melatonin, 5 mg, Oral, Nightly  montelukast, 10 mg, Oral, Nightly  multivitamin, 1 tablet, Oral, Daily  oxybutynin XL, 10 mg, Oral, Daily  pantoprazole, 40 mg, Oral, Q AM  predniSONE, 20 mg, Oral, Daily With Breakfast  sucralfate, 1 g, Oral, 4x Daily AC & at Bedtime        ASSESSMENT  /  PLAN:  Exacerbation of COPD  Infiltrates - post viral or fluid, complete azithromycin for ae, follow up as outpt  Paroxysmal atrial fibrillation  Chronic diastolic congestive heart failure  Paroxysmal atrial fibrillation and coagulated  Coronary artery disease  Hypothyroidism  Hyponatremia -  per primary      She does not like she has a little bit more rhonchi and wheeze today.  Suspect this is secondary to her refusing her duo nebs.  Continue with scheduled duo nebs I discussed with her the importance of doing this.  Also continue her steroids as well as her scheduled long-acting bronchodilators. Not sure that neb steroids helping but may help us reduce systemic steroids as she gets sig agitation from these.  Azithromycin reduce dose to 250 complete a 5-day course here.  BNP?  pred to 40 daily  Will need continued outpatient follow up - she sees Dr CLAIRE In our office      Sharad Green MD  [facility]  01/03/21  473

## 2021-01-03 NOTE — PLAN OF CARE
Goal Outcome Evaluation:  Plan of Care Reviewed With: patient  Progress: no change  Outcome Summary: Pt VSS on 2L NC in SR, Fluid Restriction started, Encouraged activity OOB to Chair and BSC, Purewick in place

## 2021-01-03 NOTE — PLAN OF CARE
Goal Outcome Evaluation:  Plan of Care Reviewed With: patient  Progress: improving  Outcome Summary: Pt is stable.  C/o pain in legs from arthritis, received tylenol 650 mg.  PW functioning without difficulty.  Able to make needs known.  Will continue to monitor.

## 2021-01-03 NOTE — PROGRESS NOTES
"   LOS: 4 days   Patient Care Team:  Lisseth Vasquez APRN as PCP - General (Family Medicine)  Inocencio Duff MD as Consulting Physician (Pulmonary Disease)    Chief Complaint: knee pain, wheezing    Subjective     Pt feeling no better this AM--actually feeling a little worse. Tolerating diet. Voiding well. Was able to sleep a little better last night. Less irritable today. C/o a lot of chest congestion. Using IS as directed. C/o vaginal irritation and pain in both her knees due to her chronic OA. Would like some AsperCream--that's what she uses at home.      Subjective:  Symptoms:  Worsening.  She reports cough and weakness.  No shortness of breath, malaise, chest pain, headache, chest pressure, anorexia, diarrhea or anxiety.    Diet:  Adequate intake.  No nausea or vomiting.    Activity level: Impaired due to weakness.    Pain:  She reports no pain.        History taken from: patient chart RN    Objective     Vital Signs  Temp:  [97.9 °F (36.6 °C)-98.8 °F (37.1 °C)] 98.4 °F (36.9 °C)  Heart Rate:  [54-86] 76  Resp:  [18-24] 20  BP: (137-163)/(76-97) 162/86    Objective:  General Appearance:  Comfortable, in no acute distress and ill-appearing (chronically).    Vital signs: (most recent): Blood pressure 162/86, pulse 76, temperature 98.4 °F (36.9 °C), temperature source Oral, resp. rate 20, height 172.7 cm (67.99\"), weight 81.5 kg (179 lb 10.8 oz), SpO2 94 %.  Vital signs are normal.  No fever.    Output: Producing urine and producing stool.    HEENT: Normal HEENT exam.    Lungs:  Normal effort and normal respiratory rate.  She is not in respiratory distress.  No stridor.  There are wheezes (coarse global end-exp wheeze--worse on left, prolonged exp phase).  No rales or rhonchi.    Heart: Normal rate.  Regular rhythm.    Abdomen: Abdomen is soft.  Bowel sounds are normal.   There is no abdominal tenderness.     Extremities: There is no dependent edema.    Pulses: Distal pulses are intact.    Neurological: " Patient is alert and oriented to person, place and time.  Normal strength.  Patient has normal muscle tone.  ([place]).    Pupils:  Pupils are equal.   Skin:  Warm and dry.  No rash.             Results Review:     I reviewed the patient's new clinical results.  I reviewed the patient's other test results and agree with the interpretation  Discussed with pt and RN and Dr. Green    Results from last 7 days   Lab Units 01/02/21  0846 01/01/21  0515 12/31/20  0337 12/30/20  1833   WBC 10*3/mm3 12.12* 10.13 5.21 11.06*   HEMOGLOBIN g/dL 12.6 11.9* 12.8 12.1   PLATELETS 10*3/mm3 386 339 310 289     Results from last 7 days   Lab Units 01/02/21  0846 01/01/21  0515 12/31/20  0337 12/30/20  1833   SODIUM mmol/L 128* 129* 129* 131*   POTASSIUM mmol/L 3.8 3.9 3.5 3.4*   CHLORIDE mmol/L 88* 93* 92* 92*   CO2 mmol/L 26.4 25.6 25.7 28.6   BUN mg/dL 20 14 10 11   CREATININE mg/dL 0.79 0.67 0.76 0.82   CALCIUM mg/dL 9.5 9.4 9.3 9.2   MAGNESIUM mg/dL  --  1.8  --   --    Estimated Creatinine Clearance: 53.4 mL/min (by C-G formula based on SCr of 0.79 mg/dL).    Medication Review: reviewed and adjusted    Assessment/Plan       COPD exacerbation (CMS/HCC)    Hyponatremia    Acquired hypothyroidism    Chronic diastolic CHF (congestive heart failure) (CMS/HCC)    Coronary artery disease    Anemia    PAF (paroxysmal atrial fibrillation) (CMS/HCC)    Hypokalemia    Chronic anticoagulation    Hypoxia    Chronic respiratory failure with hypoxia (CMS/HCC)    Elevated d-dimer    Pressure injury of coccygeal region, stage 2 (CMS/HCC)    Acute vaginitis    Primary osteoarthritis of both knees          Plan:   (Pleasant 88yo woman with h/o paroxysmal atrial fibrillation, CAD, chronic diastolic CHF, anemia, hypothyroidism, and COPD/CHRF, who presented to ER with c/o 3d h/o worsening SOA and productive cough. She had run out of nebulizer treatments a few days prior.     Continue Pulmicort and Brovana, refusing DuoNebs   IV Solumedrol changed to po  Prednisone now, decrease dose in AM tomorrow  Pulm consulted given appearance of CTA chest, appreciate their attention to pt   Complete 5d course of Zithromax  Continue IS and OPEP  RVP neg, PCT wnl  WBC up slightly but suspect due to steroids  DDimer elevated but no PE on CTA and pt chronically on Eliquis, venous duplex BLEs neg for DVT  Monitor closely for worsening O2 requirement, currently stable on 2L/min (that is her baseline requirement)  Monitor Na+ closely, it is down a bit today at 128, review of chart shows it to be chronically low, will check urine studies and fluid restrict for now  K+ wnl again this AM after oral replacement, Mg++ level 1.8, continue daily KCl   Wound RN following for pressure changes to coccyx  Sugars high with steroids, A1c 5.6, not requiring any tx at present  Start topical tx for vaginitis  Start Aspercream for her knee pain, continue APAP    Eliquis restarted and should suffice for DVT ppx  Full code confirmed  Dispo: TBD, PT working with pt, pt initially unlikely to agree to any sort of dc to rehab facility, but now she is concerned that she isn't strong enough to go home--even with the assistance of her granddtr).       Catracho Del Real MD  01/03/21  09:05 EST    Time: 30min

## 2021-01-04 PROBLEM — I10 HTN (HYPERTENSION): Status: ACTIVE | Noted: 2021-01-01

## 2021-01-04 NOTE — PROGRESS NOTES
Name: Eduarda Lopez ADMIT: 2020   : 1931  PCP: Lisseth Vasquez APRN    MRN: [id number] LOS: 5 days   AGE/SEX: 89 y.o. female  ROOM: [facility]     Subjective   Subjective   Patient appears generally weak and in no apparent distress.  Tolerating p.o.  Reports cough without production; however, would prefer mucolytic.  Spoke with RN.     Review of Systems   Constitutional: Negative for chills and fever.   HENT: Positive for congestion. Negative for rhinorrhea.    Respiratory: Positive for cough and shortness of breath.    Cardiovascular: Negative for chest pain and leg swelling.   Gastrointestinal: Negative for abdominal pain, constipation, diarrhea, nausea and vomiting.   Genitourinary: Negative for difficulty urinating and dysuria.   Musculoskeletal: Positive for gait problem (2/2 generalized weakness). Negative for joint swelling.   Skin: Negative for rash and wound.   Neurological: Positive for weakness (generally). Negative for dizziness, light-headedness and headaches.   Psychiatric/Behavioral: Negative for confusion and hallucinations.        Objective   Objective   Vital Signs  Temp:  [97.9 °F (36.6 °C)-98.8 °F (37.1 °C)] 98.8 °F (37.1 °C)  Heart Rate:  [78-90] 80  Resp:  [18-20] 18  BP: (159-187)/() 187/96  SpO2:  [92 %-95 %] 94 %  on  Flow (L/min):  [2-3] 3;   Device (Oxygen Therapy): nasal cannula  Body mass index is 27.33 kg/m².     Physical Exam  Constitutional:       General: She is not in acute distress.     Comments: Elderly, frail   HENT:      Head: Normocephalic and atraumatic.   Neck:      Musculoskeletal: Normal range of motion and neck supple.   Cardiovascular:      Rate and Rhythm: Normal rate.      Heart sounds: Normal heart sounds.   Pulmonary:      Effort: No respiratory distress.      Comments: Scattered coarse rhonchi throughout all lung fields  Abdominal:      General: Bowel sounds are normal.      Palpations: Abdomen is soft.   Musculoskeletal:      Right lower leg:  No edema.      Left lower leg: No edema.   Skin:     General: Skin is warm and dry.   Neurological:      Mental Status: She is alert. Mental status is at baseline.      Cranial Nerves: No cranial nerve deficit.   Psychiatric:         Behavior: Behavior normal.         Thought Content: Thought content normal.         Results Review     I reviewed the patient's new clinical results.  Results from last 7 days   Lab Units 01/04/21 0630 01/02/21  0846 01/01/21 0515 12/31/20  0337   WBC 10*3/mm3 15.89* 12.12* 10.13 5.21   HEMOGLOBIN g/dL 12.3 12.6 11.9* 12.8   PLATELETS 10*3/mm3 364 386 339 310     Results from last 7 days   Lab Units 01/04/21 0630 01/02/21  0846 01/01/21 0515 12/31/20  0337   SODIUM mmol/L 118* 128* 129* 129*   POTASSIUM mmol/L 3.6 3.8 3.9 3.5   CHLORIDE mmol/L 81* 88* 93* 92*   CO2 mmol/L 25.9 26.4 25.6 25.7   BUN mg/dL 16 20 14 10   CREATININE mg/dL 0.64 0.79 0.67 0.76   GLUCOSE mg/dL 96 144* 141* 215*   Estimated Creatinine Clearance: 53.4 mL/min (by C-G formula based on SCr of 0.64 mg/dL).  Results from last 7 days   Lab Units 01/04/21 0630 01/02/21  0846 01/01/21 0515 12/31/20  0337   ALBUMIN g/dL 4.00 4.00 4.10 4.10   BILIRUBIN mg/dL 1.0 0.6 0.7 1.0   ALK PHOS U/L 66 70 66 74   AST (SGOT) U/L 28 31 23 17   ALT (SGPT) U/L 24 17 9 10     Results from last 7 days   Lab Units 01/04/21 0630 01/02/21  0846 01/01/21 0515 12/31/20  0337   CALCIUM mg/dL 8.9 9.5 9.4 9.3   ALBUMIN g/dL 4.00 4.00 4.10 4.10   MAGNESIUM mg/dL  --   --  1.8  --      Results from last 7 days   Lab Units 01/01/21 0515 12/30/20  1833   PROCALCITONIN ng/mL 0.06 0.08     COVID19   Date Value Ref Range Status   01/01/2021 Not Detected Not Detected - Ref. Range Final   12/30/2020 Not Detected Not Detected - Ref. Range Final     No results found for: HGBA1C, POCGLU    Duplex Venous Lower Extremity - Bilateral CAR  · Normal bilateral lower extremity venous duplex scan.     CT Angiogram Chest With & Without Contrast  Narrative: CT  ANGIOGRAM CHEST     HISTORY: Low to intermediate suspicion of pulmonary embolism. Positive  d-dimer.     TECHNIQUE: CT angiogram of the chest with multiplanar and  three-dimensional reformatted images was performed using 95 mL of  Isovue-370 contrast and is correlated with chest x-ray performed  yesterday. There is no previous cross-sectional imaging of the chest for  correlation.     Radiation dose reduction techniques were utilized, including automated  exposure control and exposure modulation based on body size.     FINDINGS: The thoracic aorta is normal in caliber and enhances normally.  The pulmonary arteries are also normal in caliber and enhance normally.  There is no pulmonary thromboembolism.     There is atheromatous vascular calcification along the coronary arteries  and elsewhere in the chest. There is no pleural or pericardial effusion.  There is no thoracic lymphadenopathy. There are several small areas of  ground glass density scattered about the lungs bilaterally. This is  nonspecific and most consistent with pneumonitis, although the etiology  is unknown. COVID 19 pneumonia might have this appearance, although this  is not typical. Aspiration pneumonitis and other infectious etiologies  are considerations. The pattern is also not typical for pulmonary edema.  There are some secretions partially filling lower lung zone bronchioles.     There is degenerative change in the thoracic spine and at the left  shoulder and arthroplasty hardware at the right shoulder.     Impression: No evidence of pulmonary thromboembolism. Multiple small  areas of mild, ground glass opacity is observed in the lungs  bilaterally, nonspecific.     This report was finalized on 12/31/2020 8:35 AM by Dr. Catracho Spencer M.D.       Scheduled Medications  apixaban, 5 mg, Oral, Q12H  arformoterol, 15 mcg, Nebulization, BID - RT  budesonide, 1 mg, Nebulization, Daily - RT  citalopram, 20 mg, Oral, Daily  dilTIAZem CD, 120 mg, Oral,  Q24H  furosemide, 40 mg, Oral, Daily  guaiFENesin, 1,200 mg, Oral, Q12H  hydrALAZINE, 10 mg, Oral, Q8H  ipratropium-albuterol, 3 mL, Nebulization, 4x Daily - RT  levothyroxine, 50 mcg, Oral, Q AM  melatonin, 5 mg, Oral, Nightly  miconazole, , Topical, Q12H  montelukast, 10 mg, Oral, Nightly  multivitamin, 1 tablet, Oral, Daily  oxybutynin XL, 10 mg, Oral, Daily  pantoprazole, 40 mg, Oral, Q AM  predniSONE, 40 mg, Oral, Daily With Breakfast  sucralfate, 1 g, Oral, 4x Daily AC & at Bedtime    Infusions   Diet  Diet Regular; Cardiac, Daily Fluid Restriction; 1500 mL Fluid Per Day       Assessment/Plan     Active Hospital Problems    Diagnosis  POA   • **COPD exacerbation (CMS/HCC) [J44.1]  Yes   • HTN (hypertension) [I10]  Unknown   • Acute vaginitis [N76.0]  No   • Primary osteoarthritis of both knees [M17.0]  Yes   • Hypokalemia [E87.6]  Yes   • Chronic anticoagulation [Z79.01]  Not Applicable   • Hypoxia [R09.02]  Yes   • Chronic respiratory failure with hypoxia (CMS/[facility]) [J96.11]  Yes   • Elevated d-dimer [R79.89]  Yes   • Pressure injury of coccygeal region, stage 2 (CMS/[facility]) [L89.152]  Yes   • PAF (paroxysmal atrial fibrillation) (CMS/[facility]) [I48.0]  Yes   • Anemia [D64.9]  Yes   • Coronary artery disease [I25.10]  Yes   • Chronic diastolic CHF (congestive heart failure) (CMS/HCC) [I50.32]  Yes   • Acquired hypothyroidism [E03.9]  Yes   • Hyponatremia [E87.1]  Yes      Resolved Hospital Problems   No resolved problems to display.       89 y.o. female admitted with COPD exacerbation (CMS/HCC).      COPD exacerbation (CMS/HCC) / chronic respiratory failure with hypoxia.  Pulmonary following--currently on 3L NC.  Afebrile.  Duo nebs, prednisone 40 mg daily, LABA, 5 days course azithromycin, OP follow-up pulmonary following DC.      Hyponatremia.  Renal consulted as serum sodium trending downward, today serum Na+ 118 despite fluid restriction 1.5 L daily.     HTN.  BP greater than optimal despite diltiazem.  Ordering  hydralazine 10 mg p.o. 3 times daily for now, continue monitor BP.    Chronic diastolic CHF (congestive heart failure) (CMS/[facility]).  Chest x-ray 12/30/2020 no focal pulmonary consolidation, pulmonary vasculature unremarkable.  proBNP >2600 on 1/4/2020 increased from >1700 on 12/30/2020--plan to defer diuretics to renal.    Coronary artery disease.  No evidence of ACS.    PAF (paroxysmal atrial fibrillation) (CMS/[facility]) /   Chronic anticoagulation.  Rate controlled, diltiazem, apixaban.  ·   Elevated d-dimer.  CTA chest report no evidence of pulmonary thromboembolism, multiple small areas of mild, groundglass opacity observable lungs bilaterally, nonspecific.  VRP negative to include COVID-19.    Pressure injury of coccygeal region, stage 2 (CMS/[facility]).  Wound care recommend follow hospital protocol for stage II pressure injury.     Acute vaginitis    Primary osteoarthritis of both knees.  Complicating physical therapy.    · apixaban adequate for DVT prophylaxis.  · CPR  · Discussed with Dr. Torres.  · Anticipate discharge TBD / CCP following--plan home with granddaughter continue A home health      CALEB Chou  [facility]  01/04/21  12:20 EST    I wore protective equipment throughout this patient encounter including a face mask, gloves and protective eyewear.  Hand hygiene was performed before donning protective equipment and after removal when leaving the room.

## 2021-01-04 NOTE — THERAPY TREATMENT NOTE
Patient Name: Eduarda Lopez  : 1931    MRN: [id number]                              Today's Date: 2021       Admit Date: 2020    Visit Dx:     ICD-10-CM ICD-9-CM   1. COPD exacerbation (CMS/[facility])  J44.1 491.21   2. Acute on chronic respiratory failure with hypoxia (CMS/[facility])  J96.21 518.84     799.02     Patient Active Problem List   Diagnosis   • S/P hip hemiarthroplasty   • Dislocation of hip prosthesis (CMS/[facility])   • Sepsis without acute organ dysfunction (CMS/[facility])   • Acute UTI (urinary tract infection)   • Hyponatremia   • Acquired hypothyroidism   • Chronic diastolic CHF (congestive heart failure) (CMS/[facility])   • Enteritis due to Norovirus   • Coronary artery disease   • Spleen hematoma   • Anemia   • RBBB   • PAF (paroxysmal atrial fibrillation) (CMS/[facility])   • Chest pain, atypical   • Chronic obstructive pulmonary disease with acute exacerbation (CMS/[facility])   • Nausea & vomiting   • COPD exacerbation (CMS/[facility])   • Hypokalemia   • Chronic anticoagulation   • Hypoxia   • Chronic respiratory failure with hypoxia (CMS/[facility])   • Elevated d-dimer   • Pressure injury of coccygeal region, stage 2 (CMS/[facility])   • Acute vaginitis   • Primary osteoarthritis of both knees     Past Medical History:   Diagnosis Date   • Arthritis    • Asthma    • CHF (congestive heart failure) (CMS/[facility])    • Disease of thyroid gland    • Fracture of hip (CMS/[facility])    • Hyperlipidemia    • Hypertension    • Primary osteoarthritis of both knees 1/3/2021   • Thyroid disease      Past Surgical History:   Procedure Laterality Date   • CHOLECYSTECTOMY     • HIP SURGERY     • HYSTERECTOMY     • JOINT REPLACEMENT     • SHOULDER SURGERY     • THYROID SURGERY     • TONSILLECTOMY       General Information    No documentation.       Mobility    No documentation.       Obj/Interventions    No documentation.       Goals/Plan    No documentation.       Clinical Impression    No documentation.       Outcome Measures    No documentation.        Physical Therapy Education                 Title: PT OT SLP Therapies (In Progress)     Topic: Physical Therapy (In Progress)     Point: Mobility training (In Progress)     Learning Progress Summary           Patient Acceptance, E,D, NR by  at 1/3/2021 1145    Acceptance, E, VU,NR by  at 1/2/2021 1240    Acceptance, E,D, VU,NR by  at 1/1/2021 1629                   Point: Home exercise program (In Progress)     Learning Progress Summary           Patient Acceptance, E,D, NR by DIANE at 1/3/2021 1145    Acceptance, E, VU,NR by  at 1/2/2021 1240    Acceptance, E,D, VU,NR by  at 1/1/2021 1629                   Point: Body mechanics (In Progress)     Learning Progress Summary           Patient Acceptance, E,D, NR by  at 1/3/2021 1145    Acceptance, E, VU,NR by  at 1/2/2021 1240    Acceptance, E,D, VU,NR by  at 1/1/2021 1629                   Point: Precautions (In Progress)     Learning Progress Summary           Patient Acceptance, E,D, NR by DIANE at 1/3/2021 1145    Acceptance, E, VU,NR by  at 1/2/2021 1240    Acceptance, E,D, VU,NR by  at 1/1/2021 1629                               User Key     Initials Effective Dates Name Provider Type Discipline     02/05/19 -  Vicky Callahan, PT Physical Therapist PT    DIANE 03/07/18 -  Ani Romero PTA Physical Therapy Assistant PT              PT Recommendation and Plan     Plan of Care Reviewed With: patient  Outcome Summary: Pt c/o SOA but less anxious given rest breaks and moving slowly to EOB; would only go to chair since she heard lunch was on the way, she plans home with grandchild, but they would like to consider SNU until pt more indep     Time Calculation:     Therapy Charges for Today     Code Description Service Date Service Provider Modifiers Qty    63345090103 HC PT THER PROC EA 15 MIN 1/3/2021 Ani Romero PTA GP 1    44530630559 HC PT THER SUPP EA 15 MIN 1/3/2021 Ani Romero PTA GP 1          PT G-Codes  Outcome Measure  Options: AM-PAC 6 Clicks Basic Mobility (PT)  -[facility] 6 Clicks Score (PT): 13    Ani Romero, PTA  1/4/2021

## 2021-01-04 NOTE — PLAN OF CARE
Goal Outcome Evaluation:  Pt is not able to understand fluid restriction diet and how it correlates to her COPD/CHF or breathing difficulties. She states she drinks all day and night and uses her nebulizer for her breathing. NA level trending down. A&O to self, VSS, working with PT/OT. CTM

## 2021-01-04 NOTE — PLAN OF CARE
Goal Outcome Evaluation:  Plan of Care Reviewed With: patient  Progress: no change  Outcome Summary: Pt c/o SOA but less anxious given rest breaks and moving slowly to EOB; would only go to chair since she heard lunch was on the way, she plans home with grandchild, but they would like to consider SNU until pt more indep    Toby Gutierrez PT tech present    Patient was not wearing a face mask during this therapy encounter. Therapist used appropriate personal protective equipment including eye protection, mask, and gloves.  Mask used was standard procedure mask. Appropriate PPE was worn during the entire therapy session. Hand hygiene was completed before and after therapy session. Patient is not in enhanced droplet precautions.

## 2021-01-04 NOTE — CONSULTS
"  Referring Provider: Dr. Sudhakar Torres  Reason for Consultation: hypoNa    Subjective     Chief complaint   Chief Complaint   Patient presents with   • Shortness of Breath   • Chest Pain       History of present illness:  89 yo WF with chronic hypoNa (baseline Na level appears to be low-130's), admitted 12/30 for further evaluation of shortness of breath and cough.  Serum sodium level has fallen while here, with initial value 131, with further decline since: 128 two days ago and 118 today.      Hospitalization:  diagnosed with COPD exacerbation; placed on prednisone and aggressive pulmonary toilet; eating still poorly but trying to push fluids; daily weights not being done; empiric antibiotics for possible pneumonia.  TSH 0.2; urine studies on 1/3 with urine osmolality 333 and urine sodium 61; serum osmolality 262.    Full PMH outlined below; pertinent is severe COPD on home O2; chronic CHF; CAD; and hypothyroidism.  Fluid restriction in place.  Renal function is normal.    · She thinks her weight has been stable for the past 1 year, but notes that she has lost 100 pounds-plus over the last 3 years  · Has been eating poorly here  · No urinary complaints  · Tries to drink water \"constantly\" at home, stating that she was told to do this by a previous physician  · Balance is poor; she describes this to leg weakness more than anything  · Describes left knee pain  · Denies lower extremity swelling but does report recent orthopnea    Past Medical History:   Diagnosis Date   • Arthritis    • Asthma    • CHF (congestive heart failure) (CMS/HCC)    • Disease of thyroid gland    • Fracture of hip (CMS/[facility])    • Hyperlipidemia    • Hypertension    • Primary osteoarthritis of both knees 1/3/2021   • Thyroid disease      Past Surgical History:   Procedure Laterality Date   • CHOLECYSTECTOMY     • HIP SURGERY     • HYSTERECTOMY     • JOINT REPLACEMENT     • SHOULDER SURGERY     • THYROID SURGERY     • TONSILLECTOMY       Family " History   Problem Relation Age of Onset   • Hypertension Mother    • Cancer Sister    • Diabetes Brother    • Hypertension Maternal Grandmother    • Heart disease Daughter    • Hypertension Daughter    • Hypertension Son    • Hypertension Daughter    • Sudden death Son      Social History     Tobacco Use   • Smoking status: Former Smoker   • Smokeless tobacco: Never Used   • Tobacco comment: caff use    Substance Use Topics   • Alcohol use: No   • Drug use: No     Medications Prior to Admission   Medication Sig Dispense Refill Last Dose   • apixaban (ELIQUIS) 5 MG tablet tablet Take 5 mg by mouth 2 (Two) Times a Day.   Patient Taking Differently at Unknown time   • budesonide (PULMICORT) 1 MG/2ML nebulizer solution Take 1 mg by nebulization Daily.      • Cetirizine HCl 10 MG capsule Take 10 mg by mouth Daily As Needed.   Patient Taking Differently at Unknown time   • citalopram (CeleXA) 20 MG tablet Take 20 mg by mouth Daily.   12/30/2020 at Unknown time   • dilTIAZem (TIAZAC) 120 MG 24 hr capsule Take 120 mg by mouth Daily.      • famotidine (PEPCID) 40 MG tablet Take 40 mg by mouth Daily.   Patient Taking Differently at Unknown time   • fluticasone (FLONASE) 50 MCG/ACT nasal spray into the nostril(s) as directed by provider Daily As Needed.   Patient Taking Differently at Unknown time   • formoterol (PERFOROMIST) 20 MCG/2ML nebulizer solution Take  by nebulization 2 (Two) Times a Day.      • furosemide (LASIX) 40 MG tablet Take 1 tablet by mouth Daily. 1 tab AM   12/30/2020 at Unknown time   • guaiFENesin (MUCINEX) 600 MG 12 hr tablet Take 1,200 mg by mouth As Needed.   Patient Taking Differently at Unknown time   • ipratropium-albuterol (DUO-NEB) 0.5-2.5 mg/3 ml nebulizer Take 3 mL by nebulization Every 4 (Four) Hours As Needed for Wheezing.      • levothyroxine (SYNTHROID, LEVOTHROID) 50 MCG tablet Take 50 mcg by mouth Daily.      • montelukast (SINGULAIR) 10 MG tablet Take 10 mg by mouth Every Night.      •  "Multiple Vitamin (MULTI VITAMIN DAILY PO) Take 1 tablet by mouth Daily.      • oxybutynin XL (DITROPAN-XL) 10 MG 24 hr tablet Take 10 mg by mouth Daily.      • pantoprazole (PROTONIX) 40 MG EC tablet Take 40 mg by mouth.      • sucralfate (CARAFATE) 1 g tablet Take 1 g by mouth 4 (Four) Times a Day.      • traMADol (ULTRAM) 50 MG tablet Take 50 mg by mouth 3 (Three) Times a Day.      • acetaminophen (TYLENOL) 650 MG 8 hr tablet Take 650 mg by mouth Every 8 (Eight) Hours As Needed for Mild Pain .      • aspirin 81 MG tablet Take 81 mg by mouth Daily.      • cefdinir (OMNICEF) 300 MG capsule Take 1 capsule by mouth 2 (Two) Times a Day. 14 capsule 0    • Cholecalciferol (VITAMIN D3) 2000 units tablet Take 1 tablet by mouth Daily.      • clonazePAM (KlonoPIN) 0.5 MG tablet Take 0.5 mg by mouth Daily.      • docusate sodium (COLACE) 100 MG capsule Take 100 mg by mouth 2 (Two) Times a Day.      • NIFEdipine CC (ADALAT CC) 30 MG 24 hr tablet Take 30 mg by mouth Daily.      • Polyethylene Glycol 3350 (MIRALAX PO) Take 1 tablet by mouth Daily.        Allergies:  Codeine, Ibuprofen, Morphine and related, and Penicillins    Review of Systems  14-point ROS performed and all negative except for pertinent +/-'s detailed in HPI.     Objective     Vital Signs  Temp:  [97.9 °F (36.6 °C)-99 °F (37.2 °C)] 99 °F (37.2 °C)  Heart Rate:  [79-90] 80  Resp:  [16-20] 16  BP: (159-187)/() 163/92    Flowsheet Rows      First Filed Value   Admission Height  172.7 cm (67.99\") Documented at 12/31/2020 1446   Admission Weight  81.5 kg (179 lb 10.8 oz) Documented at 12/30/2020 2300           I/O this shift:  In: 830 [P.O.:830]  Out: -   I/O last 3 completed shifts:  In: 590 [P.O.:590]  Out: 1350 [Urine:1350]    Intake/Output Summary (Last 24 hours) at 1/4/2021 1633  Last data filed at 1/4/2021 1200  Gross per 24 hour   Intake 1180 ml   Output 450 ml   Net 730 ml       Physical Exam:  NAD; pleasant; appropriate; looks stated age  Chronically " ill-appearing; hard of hearing  MMM; AT/NC   No eye discharge; no scleral icterus  No JVD; bilateral carotid bruits  Rhonchi and a few soft wheezes bilat; not labored on NC O2  Irregularly irregular, no rub  Soft, NT, ND, BS+  No significant edema  +clubbing  No asterixis  Moves all extremities     Results Review:  Results from last 7 days   Lab Units 01/04/21  1338 01/04/21  0630 01/02/21  0846 01/01/21  0515   SODIUM mmol/L 116* 118* 128* 129*   POTASSIUM mmol/L 3.3* 3.6 3.8 3.9   CHLORIDE mmol/L 79* 81* 88* 93*   CO2 mmol/L 25.4 25.9 26.4 25.6   BUN mg/dL  --  16 20 14   CREATININE mg/dL  --  0.64 0.79 0.67   CALCIUM mg/dL  --  8.9 9.5 9.4   BILIRUBIN mg/dL  --  1.0 0.6 0.7   ALK PHOS U/L  --  66 70 66   ALT (SGPT) U/L  --  24 17 9   AST (SGOT) U/L  --  28 31 23   GLUCOSE mg/dL  --  96 144* 141*       Estimated Creatinine Clearance: 53.4 mL/min (by C-G formula based on SCr of 0.64 mg/dL).    Results from last 7 days   Lab Units 01/01/21  0515   MAGNESIUM mg/dL 1.8       Results from last 7 days   Lab Units 01/04/21  0630 01/02/21  0846 01/01/21  0515 12/31/20  0337 12/30/20  1833   WBC 10*3/mm3 15.89* 12.12* 10.13 5.21 11.06*   HEMOGLOBIN g/dL 12.3 12.6 11.9* 12.8 12.1   PLATELETS 10*3/mm3 364 386 339 310 289             Active Medications  apixaban, 5 mg, Oral, Q12H  arformoterol, 15 mcg, Nebulization, BID - RT  budesonide, 1 mg, Nebulization, Daily - RT  citalopram, 20 mg, Oral, Daily  dilTIAZem CD, 120 mg, Oral, Q24H  furosemide, 40 mg, Oral, Daily  guaiFENesin, 1,200 mg, Oral, Q12H  hydrALAZINE, 10 mg, Oral, Q8H  ipratropium-albuterol, 3 mL, Nebulization, 4x Daily - RT  levothyroxine, 50 mcg, Oral, Q AM  melatonin, 5 mg, Oral, Nightly  miconazole, , Topical, Q12H  montelukast, 10 mg, Oral, Nightly  multivitamin, 1 tablet, Oral, Daily  oxybutynin XL, 10 mg, Oral, Daily  pantoprazole, 40 mg, Oral, Q AM  predniSONE, 40 mg, Oral, Daily With Breakfast  sucralfate, 1 g, Oral, 4x Daily AC & at Bedtime            Assessment/Plan   Assessment  1.  Acute on chronic hyponatremia, with likely euvolemia:  chronic component probably due to severe lung disease, with acute component d/t COPD exacerbation with hypoxia, poor solute intake, recent attempt to drink more water, and significant OA pain pain.  Renal function preserved; TSH not elevated.  2.  COPD exacerbation  3.  Chronic CHF  4.  PAF  5.  Hypokalemia      COPD exacerbation (CMS/HCC)    Hyponatremia    Acquired hypothyroidism    Chronic diastolic CHF (congestive heart failure) (CMS/HCC)    Coronary artery disease    Anemia    PAF (paroxysmal atrial fibrillation) (CMS/HCC)    Hypokalemia    Chronic anticoagulation    Hypoxia    Chronic respiratory failure with hypoxia (CMS/HCC)    Elevated d-dimer    Pressure injury of coccygeal region, stage 2 (CMS/HCC)    Acute vaginitis    Primary osteoarthritis of both knees    HTN (hypertension)      Plan  1.  Replace potassium and discontinue furosemide for now  2.  Add strawberry Ensure to augment nutrition  3.  Continue fluid restriction, tho I encouraged her to eat more  4.  Check serum uric acid  5.  Re-check urine studies again tomorrow in the absence of any diuretic    I discussed the patient's findings and my recommendations with the patient    Amilcar Sandoval MD  01/04/21  16:33 EST

## 2021-01-04 NOTE — PLAN OF CARE
Goal Outcome Evaluation:  Plan of Care Reviewed With: patient  Progress: no change  Outcome Summary: patient appears to get anxious, then c/o SOA, lungs continue to be coarse and difficulty coughing up congestion; PRN albuterol ordered; purewick in place but patient able to get OOB and needs encouragement to ambulate

## 2021-01-05 NOTE — PLAN OF CARE
"Goal Outcome Evaluation:  Plan of Care Reviewed With: patient  Progress: no change     Pt A+O x4, VSS. On 4L O2 humidified NC. RT treatments given. Pt frequently calls out to staff asking for fluids, not willing to accept the 1200 mL fluid restriction. So far, 960 mL intake this shift. Beginning of nightshift, the other RN told me that the pt had already gone over the restriction for him. Pt is disrespectful to staff calling them, \"stupid.\" Anxiety present, but easily calmed down with interaction. She voids in Purewick. She did have BM & pt is very unsteady on feet as it took assist x2 to pivot to BSC. Mepolex on coccyx & intact. Pt refused SCDs eventually, not liking the \"squeezing.\" Weight shift assistance given frequently. Cream placed on knees where joint pain present. Pt has not slept much tonight. Pt to receive more respiratory treatments later today. I will continue to monitor and progress towards goals as tolerated.    Myra Thapa RN  "

## 2021-01-05 NOTE — PLAN OF CARE
Goal Outcome Evaluation:  Plan of Care Reviewed With: patient  Progress: no change  Outcome Summary: Pt demonstrated slight improvment in independence of functional transfer to and from bedside commode, requiring min to mod A of therapist and max verbal and tactile cueing for safety throughout. She moves impulsively and completed the transfer to the commode very unsafely, reaching for the commode without having other arm supported on walker, pushing commode further away from her body, and leaning severely forward in standing position. She was able to more accuratley follow commands and return to the bed in more safe manner. She will continue to benefot from skilled PT to further address deficits in activity tolerance, strength, safety awareness, and gait deviations. Anticipate d/c to SNF.  Patient was intermittently wearing a face mask during this therapy encounter. Therapist used appropriate personal protective equipment including eye protection, mask, and gloves.  Mask used was standard procedure mask. Appropriate PPE was worn during the entire therapy session. Hand hygiene was completed before and after therapy session. Patient is not in enhanced droplet precautions.

## 2021-01-05 NOTE — THERAPY TREATMENT NOTE
Patient Name: Eduarda Lopez  : 1931    MRN: [id number]                              Today's Date: 2021       Admit Date: 2020    Visit Dx:     ICD-10-CM ICD-9-CM   1. COPD exacerbation (CMS/[facility])  J44.1 491.21   2. Acute on chronic respiratory failure with hypoxia (CMS/[facility])  J96.21 518.84     799.02     Patient Active Problem List   Diagnosis   • S/P hip hemiarthroplasty   • Dislocation of hip prosthesis (CMS/[facility])   • Sepsis without acute organ dysfunction (CMS/[facility])   • Acute UTI (urinary tract infection)   • Hyponatremia   • Acquired hypothyroidism   • Chronic diastolic CHF (congestive heart failure) (CMS/[facility])   • Enteritis due to Norovirus   • Coronary artery disease   • Spleen hematoma   • Anemia   • RBBB   • PAF (paroxysmal atrial fibrillation) (CMS/[facility])   • Chest pain, atypical   • Chronic obstructive pulmonary disease with acute exacerbation (CMS/[facility])   • Nausea & vomiting   • COPD exacerbation (CMS/[facility])   • Hypokalemia   • Chronic anticoagulation   • Hypoxia   • Chronic respiratory failure with hypoxia (CMS/[facility])   • Elevated d-dimer   • Pressure injury of coccygeal region, stage 2 (CMS/[facility])   • Acute vaginitis   • Primary osteoarthritis of both knees   • HTN (hypertension)     Past Medical History:   Diagnosis Date   • Arthritis    • Asthma    • CHF (congestive heart failure) (CMS/[facility])    • Disease of thyroid gland    • Fracture of hip (CMS/[facility])    • Hyperlipidemia    • Hypertension    • Primary osteoarthritis of both knees 1/3/2021   • Thyroid disease      Past Surgical History:   Procedure Laterality Date   • CHOLECYSTECTOMY     • HIP SURGERY     • HYSTERECTOMY     • JOINT REPLACEMENT     • SHOULDER SURGERY     • THYROID SURGERY     • TONSILLECTOMY       General Information     Row Name 21 1458          Physical Therapy Time and Intention    Document Type  therapy note (daily note)  -AP     Mode of Treatment  individual therapy;physical therapy  -AP     Row Name 21 1458           General Information    Patient Profile Reviewed  yes  -AP     Existing Precautions/Restrictions  fall;oxygen therapy device and L/min  -AP     Row Name 01/05/21 1458          Cognition    Orientation Status (Cognition)  oriented x 3  -AP     Row Name 01/05/21 1458          Safety Issues, Functional Mobility    Impairments Affecting Function (Mobility)  balance;endurance/activity tolerance;postural/trunk control;shortness of breath;strength  -AP       User Key  (r) = Recorded By, (t) = Taken By, (c) = Cosigned By    Initials Name Provider Type    AP Belle Hirsch PT Physical Therapist        Mobility     Row Name 01/05/21 1458          Bed Mobility    Bed Mobility  scooting/bridging  -AP     Scooting/Bridging Mccleary (Bed Mobility)  moderate assist (50% patient effort);verbal cues;nonverbal cues (demo/gesture)  -AP     Sit-Supine Mccleary (Bed Mobility)  contact guard  -AP     Assistive Device (Bed Mobility)  bed rails;head of bed elevated;draw sheet  -AP     Comment (Bed Mobility)  completed with increased independence and less vc, pt was motivated to get EOB as she had to gail the bathroom  -AP     Row Name 01/05/21 1458          Transfers    Comment (Transfers)  STS completed x 5 throughout treatment with mod A x 1 to one completion of min A x 1. mod to max vc for standing posture and hand placement on walker  -AP     Row Name 01/05/21 1458          Bed-Chair Transfer    Assistive Device (Bed-Chair Transfers)  walker, front-wheeled  -AP     Row Name 01/05/21 1458          Sit-Stand Transfer    Sit-Stand Mccleary (Transfers)  minimum assist (75% patient effort);moderate assist (50% patient effort);1 person assist  -AP     Assistive Device (Sit-Stand Transfers)  walker, front-wheeled  -AP     Row Name 01/05/21 1458          Gait/Stairs (Locomotion)    Mccleary Level (Gait)  minimum assist (75% patient effort)  -AP     Assistive Device (Gait)  walker, front-wheeled  -AP     Distance in Feet (Gait)   4 ft total to and from bedside commode, took visible steps this visit instead of pivoting  -AP     Deviations/Abnormal Patterns (Gait)  latrell decreased;festinating/shuffling  -AP     Bilateral Gait Deviations  forward flexed posture  -AP     Comment (Gait/Stairs)  Pt remains very SOA and has difficulty following instruction, demonstrated very unsafe mechanics of transfer to bedside commode despite max verbal and tactile cueing. Transfer back to bed from commode was much better  -AP       User Key  (r) = Recorded By, (t) = Taken By, (c) = Cosigned By    Initials Name Provider Type    Belle Prakash PT Physical Therapist        Obj/Interventions     Row Name 01/05/21 1501          Motor Skills    Motor Skills  other (see comments) seated LAQ, marches, heel raises and toe raises x 1-2 sets 10  -AP     Therapeutic Exercise  other (see comments) seated LAQ, marches, heel raises and toe raises x 1-2 sets 10  -AP     Row Name 01/05/21 1501          Balance    Balance Assessment  sitting static balance;sitting dynamic balance;standing static balance;standing dynamic balance  -AP     Static Sitting Balance  WFL  -AP     Dynamic Sitting Balance  WFL  -AP     Static Standing Balance  mild impairment  -AP     Dynamic Standing Balance  moderate impairment  -AP       User Key  (r) = Recorded By, (t) = Taken By, (c) = Cosigned By    Initials Name Provider Type    Belle Prakash PT Physical Therapist        Goals/Plan    No documentation.       Clinical Impression     Row Name 01/05/21 1502          Pain    Additional Documentation  Pain Scale: Numbers Pre/Post-Treatment (Group)  -AP     Row Name 01/05/21 1502          Pain Scale: Numbers Pre/Post-Treatment    Pretreatment Pain Rating  5/10  -AP     Posttreatment Pain Rating  5/10  -AP     Pain Location - Side  Bilateral  -AP     Pain Location - Orientation  lower  -AP     Pain Location  extremity  -AP     Pain Intervention(s)  Medication (See MAR);Ambulation/increased  activity;Repositioned  -AP     Row Name 01/05/21 1502          Plan of Care Review    Plan of Care Reviewed With  patient  -AP     Progress  no change  -AP     Outcome Summary  Pt demonstrated slight improvment in independence of functional transfer to and from bedside commode, requiring min to mod A of therapist and max verbal and tactile cueing for safety throughout. She moves impulsively and completed the transfer to the commode very unsafely, reaching for the commode without having other arm supported on walker, pushing commode further away from her body, and leaning severely forward in standing position. She was able to more accuratley follow commands and return to the bed in more safe manner. She will continue to benefot from skilled PT to further address deficits in activity tolerance, strength, safety awareness, and gait deviations. Anticipate d/c to SNF.  -AP     Row Name 01/05/21 1502          Vital Signs    Post SpO2 (%)  95  -AP     O2 Delivery Post Treatment  supplemental O2  -AP     Row Name 01/05/21 1502          Positioning and Restraints    Pre-Treatment Position  in bed  -AP     Post Treatment Position  bed  -AP     In Bed  notified nsg;call light within reach;encouraged to call for assist;exit alarm on;supine;fowlers  -AP       User Key  (r) = Recorded By, (t) = Taken By, (c) = Cosigned By    Initials Name Provider Type    Belle Prakash, PT Physical Therapist        Outcome Measures     Row Name 01/05/21 1506          How much help from another person do you currently need...    Turning from your back to your side while in flat bed without using bedrails?  3  -AP     Moving from lying on back to sitting on the side of a flat bed without bedrails?  2  -AP     Moving to and from a bed to a chair (including a wheelchair)?  3  -AP     Standing up from a chair using your arms (e.g., wheelchair, bedside chair)?  3  -AP     Climbing 3-5 steps with a railing?  1  -AP     To walk in hospital room?  2   -AP     -[facility] 6 Clicks Score (PT)  14  -AP     Row Name 01/05/21 1506          Functional Assessment    Outcome Measure Options  AM-[facility] 6 Clicks Basic Mobility (PT)  -AP       User Key  (r) = Recorded By, (t) = Taken By, (c) = Cosigned By    Initials Name Provider Type    Belle Prakash PT Physical Therapist        Physical Therapy Education                 Title: PT OT SLP Therapies (Done)     Topic: Physical Therapy (Done)     Point: Mobility training (Done)     Learning Progress Summary           Patient Acceptance, E, VU,NR by YEYO at 1/5/2021 1506    Acceptance, E, NR by VAHE at 1/5/2021 1433    Acceptance, E,D, NR by DIANE at 1/3/2021 1145    Acceptance, E, VU,NR by PAVITHRA at 1/2/2021 1240    Acceptance, E,D, VU,NR by PAVITHRA at 1/1/2021 1629                   Point: Home exercise program (Done)     Learning Progress Summary           Patient Acceptance, E, VU,NR by YEYO at 1/5/2021 1506    Acceptance, E, NR by VAHE at 1/5/2021 1433    Acceptance, E,D, NR by DIANE at 1/3/2021 1145    Acceptance, E, VU,NR by PAVITHRA at 1/2/2021 1240    Acceptance, E,D, VU,NR by PAVITHRA at 1/1/2021 1629                   Point: Body mechanics (Done)     Learning Progress Summary           Patient Acceptance, E, VU,NR by YEYO at 1/5/2021 1506    Acceptance, E, NR by VAHE at 1/5/2021 1433    Acceptance, E,D, NR by DIANE at 1/3/2021 1145    Acceptance, E, VU,NR by PAVITHRA at 1/2/2021 1240    Acceptance, E,D, VU,NR by PAVITHRA at 1/1/2021 1629                   Point: Precautions (Done)     Learning Progress Summary           Patient Acceptance, E, VU,NR by YEYO at 1/5/2021 1506    Acceptance, E, NR by RB at 1/5/2021 1433    Acceptance, E,D, NR by DIANE at 1/3/2021 1145    Acceptance, E, VU,NR by PAVITHRA at 1/2/2021 1240    Acceptance, E,D, VU,NR by PAVITHRA at 1/1/2021 1629                               User Key     Initials Effective Dates Name Provider Type Discipline     02/05/19 -  Vicky Callahan, PT Physical Therapist PT    JM 03/07/18 -  Ani Romero PTA Physical Therapy  Assistant PT    RB 01/18/17 -  Nate Sarmiento RN Registered Nurse Nurse    AP 09/24/20 -  Belle Hirsch PT Physical Therapist PT              PT Recommendation and Plan     Plan of Care Reviewed With: patient  Progress: no change  Outcome Summary: Pt demonstrated slight improvment in independence of functional transfer to and from bedside commode, requiring min to mod A of therapist and max verbal and tactile cueing for safety throughout. She moves impulsively and completed the transfer to the commode very unsafely, reaching for the commode without having other arm supported on walker, pushing commode further away from her body, and leaning severely forward in standing position. She was able to more accuratley follow commands and return to the bed in more safe manner. She will continue to benefot from skilled PT to further address deficits in activity tolerance, strength, safety awareness, and gait deviations. Anticipate d/c to SNF.     Time Calculation:   PT Charges     Row Name 01/05/21 1506             Time Calculation    Start Time  1355  -AP      Stop Time  1421  -AP      Time Calculation (min)  26 min  -AP      PT Received On  01/05/21  -AP      PT - Next Appointment  01/06/21  -AP        User Key  (r) = Recorded By, (t) = Taken By, (c) = Cosigned By    Initials Name Provider Type    AP Belle Hirsch PT Physical Therapist        Therapy Charges for Today     Code Description Service Date Service Provider Modifiers Qty    [id number]  PT THER PROC EA 15 MIN 1/5/2021 Belle Hirsch, PT GP 1    [id number]  PT THERAPEUTIC ACT EA 15 MIN 1/5/2021 Belle Hirsch PT GP 1          PT G-Codes  Outcome Measure Options: AM-[facility] 6 Clicks Basic Mobility (PT)  AM-PAC 6 Clicks Score (PT): 14    Belle Hirsch PT  1/5/2021

## 2021-01-05 NOTE — PLAN OF CARE
Goal Outcome Evaluation:  Plan of Care Reviewed With: patient  Progress: no change   Patient Lung sounds still not improved.  Patient educated regarding fluid restrictions.  BOOST encouraged.  Patient drank at least two Boosts.  Patient continues to diurese

## 2021-01-05 NOTE — PLAN OF CARE
Goal Outcome Evaluation:  Plan of Care Reviewed With: patient  Progress: no change  Outcome Summary: Clinical swallow eval completed as able.  Pt reports solid foods sticking in chest.  Denies difficulties with thin liquids through pudding consistency.  Pt had taken 2 bites of sandwich and 2 Boost drinks prior to SLP eval.  Pt presented with wet, gurgly voice, indicating possible backflow.  Pt able to clear with cough and swallow.  Given 2 single sips of water with no s/s aspiration (additional liquid not given d/t fluid restriction).  Pt took multiple bites of pudding with no s/s aspiration.  Pt reported no sticking of puree or water.  Would recommend puree foods, but pt has refused puree diet.  Will downgrade to [facility] soft, but feel pt will likely maintain nutrition with Boost and smooth foods such as applesauce, oatmeal, yogurt (does not like mashed potatoes).  Based on this eval feel pt's difficulties d/t esophageal component.  Consider GI consult for appropriate recommendations.

## 2021-01-05 NOTE — PROGRESS NOTES
"                                              LOS: 5 days   Patient Care Team:  Lisseth Vasquez APRN as PCP - General (Family Medicine)  Inocencio Duff MD as Consulting Physician (Pulmonary Disease)    Chief Complaint:  F/up COPD exacerbation, respiratory failure and medical problems listed below    Subjective   Interval History  I reviewed the admission note, progress notes, PMH, PSH, Family hx, social history, imagings and prior records on this admission, summarized the finding in my note and formulated a transition of care plan.     Stated that she is not feeling well.  Continues to have shortness of breath and cough.  She stated that her cough is wet but she is unable to produce phlegm.  On oxygen 4 L/min.    REVIEW OF SYSTEMS:   CARDIOVASCULAR: No chest pain, chest pressure or chest discomfort. No palpitations or edema.   Constitutional: No fever or chills  GASTROINTESTINAL: No anorexia, nausea, vomiting or diarrhea. No abdominal pain or blood.   Musculoskeletal: Left knee pain, chronic but worse.  Attributed to osteoarthritis.    Ventilator/Non-Invasive Ventilation Settings (From admission, onward)    None                Physical Exam:     Vital Signs  Temp:  [97.9 °F (36.6 °C)-99 °F (37.2 °C)] 98.6 °F (37 °C)  Heart Rate:  [79-90] 80  Resp:  [16-20] 18  BP: (140-187)/(76-96) 140/76    Intake/Output Summary (Last 24 hours) at 1/4/2021 2019  Last data filed at 1/4/2021 1900  Gross per 24 hour   Intake 1310 ml   Output 1200 ml   Net 110 ml     Flowsheet Rows      First Filed Value   Admission Height  172.7 cm (67.99\") Documented at 12/31/2020 1446   Admission Weight  81.5 kg (179 lb 10.8 oz) Documented at 12/30/2020 2300          General Appearance:   Alert, cooperative, in no acute distress   ENMT:  Mallampati score 3, moist mucous membrane   Eyes:  Pupils equal and reactive to light. EOMI   Neck:   Large. Trachea midline. No thyromegaly.   Lungs:    Mild rhonchi and expiratory wheezing.  Diminished " breath sounds overall.  Nonlabored breathing.    Heart:   Regular rhythm and normal rate, normal S1 and S2, no         murmur   Skin:   No abnormalities observed   Abdomen:    Obese. Soft. No tenderness. No HSM.   Neuro:  Conscious, alert, oriented x3. Strength 5/5 in upper and lower  ext   Extremities:  No cyanosis, clubbing or edema.  Warm extremities and well-perfused          Results Review:        Results from last 7 days   Lab Units 01/04/21  1338 01/04/21  0630 01/02/21  0846 01/01/21  0515   SODIUM mmol/L 116* 118* 128* 129*   POTASSIUM mmol/L 3.3* 3.6 3.8 3.9   CHLORIDE mmol/L 79* 81* 88* 93*   CO2 mmol/L 25.4 25.9 26.4 25.6   BUN mg/dL  --  16 20 14   CREATININE mg/dL  --  0.64 0.79 0.67   GLUCOSE mg/dL  --  96 144* 141*   CALCIUM mg/dL  --  8.9 9.5 9.4     Results from last 7 days   Lab Units 12/30/20  1833   TROPONIN T ng/mL <0.010     Results from last 7 days   Lab Units 01/04/21  0630 01/02/21  0846 01/01/21  0515   WBC 10*3/mm3 15.89* 12.12* 10.13   HEMOGLOBIN g/dL 12.3 12.6 11.9*   HEMATOCRIT % 36.7 37.9 35.1   PLATELETS 10*3/mm3 364 386 339         Results from last 7 days   Lab Units 01/04/21  0630   PROBNP pg/mL 2,662.0*       I reviewed the patient's new clinical results.        Medication Review:   apixaban, 5 mg, Oral, Q12H  arformoterol, 15 mcg, Nebulization, BID - RT  budesonide, 1 mg, Nebulization, Daily - RT  citalopram, 20 mg, Oral, Daily  dilTIAZem CD, 120 mg, Oral, Q24H  guaiFENesin, 1,200 mg, Oral, Q12H  hydrALAZINE, 10 mg, Oral, Q8H  ipratropium-albuterol, 3 mL, Nebulization, 4x Daily - RT  levothyroxine, 50 mcg, Oral, Q AM  melatonin, 5 mg, Oral, Nightly  miconazole, , Topical, Q12H  montelukast, 10 mg, Oral, Nightly  multivitamin, 1 tablet, Oral, Daily  oxybutynin XL, 10 mg, Oral, Daily  pantoprazole, 40 mg, Oral, Q AM  potassium chloride, 40 mEq, Oral, Daily  predniSONE, 40 mg, Oral, Daily With Breakfast  sucralfate, 1 g, Oral, 4x Daily AC & at Bedtime             Diagnostic  imaging:  I personally and independently reviewed the following images:  CT chest 12/31/2020: Mosaic groundglass opacities.      Assessment   1. COPD exacerbation  2. Acute on chronic hypoxic respiratory failure, on oxygen 2 l/min at baseline  3. Chronic diastolic CHF  4. Paroxysmal A. fib  5. Acute on chronic hyponatremia  6. Abnormal CT chest: Mosaic attenuation mostly consistent with peripheral obstruction    All problems new to me    Plan     · Pulmicort and Brovana twice daily.  DuoNeb 4 times a day  · Initiate flutter and CPT 4 times a day to improve mucus clearance  · Prednisone 40 mg daily, day 1 and will taper gradually  · Fluid restriction per nephrology  · Eliquis for A. fib and VTE prophylaxis    Octavio Lino MD  01/04/21  20:19 EST            This note was dictated utilizing Dragon dictation

## 2021-01-05 NOTE — PROGRESS NOTES
Continued Stay Note  [facility]     Patient Name: Eduarda Lopez  MRN: [id number]  Today's Date: 1/5/2021    Admit Date: 12/30/2020    Discharge Plan     Row Name 01/05/21 1121       Plan    Plan  home with granddaughter and [facility]    Patient/Family in Agreement with Plan  yes    Plan Comments  Spoke with granddaughter Breanna and reviewed DC plan. Breanna prefers for the patient to come home and continue services with [facility]. CCP will follow. Myrna Moran RN        Discharge Codes    No documentation.             Myrna Moran RN

## 2021-01-05 NOTE — PLAN OF CARE
Goal Outcome Evaluation:  Plan of Care Reviewed With: patient  Progress: no change  Outcome Summary: Clinical swallow eval completed as able.  Pt reports solid foods sticking in chest.  Denies difficulties with thin liquids through pudding consistency.  Pt had taken 2 bites of sandwich and 2 Boost drinks prior to SLP arrival.  Presented with wet, gurgly voice.  Pt able to clear with cough and swallow.  Given 2 single sips of water with no s/s aspiration (additional liquid not given d/t fluid restriction).  Pt took multiple bites of pudding with no s/s aspiration.  Pt reported no sticking of puree or water.  No further wet gurlgy voice quality noted. Due to wet quality following sandwich and liquids concern with risk of aspiration if solid provided.  Recommend puree diet, but feel pt will likely maintain nutrition with Boost and smooth foods such as applesauce, oatmeal, yogurt (does not like mashed potatoes) d/t prior dislike of puree foods.  Based on this eval feel pt's difficulties d/t esophageal component.  Consider GI consult for appropriate recommendations.

## 2021-01-05 NOTE — THERAPY EVALUATION
Acute Care - Speech Language Pathology   Swallow Initial Evaluation [facility]     Patient Name: Eduarda Lopez  : 1931  MRN: [id number]  Today's Date: 2021               Admit Date: 2020    Visit Dx:     ICD-10-CM ICD-9-CM   1. COPD exacerbation (CMS/[facility])  J44.1 491.21   2. Acute on chronic respiratory failure with hypoxia (CMS/[facility])  J96.21 518.84     799.02     Patient Active Problem List   Diagnosis   • S/P hip hemiarthroplasty   • Dislocation of hip prosthesis (CMS/[facility])   • Sepsis without acute organ dysfunction (CMS/[facility])   • Acute UTI (urinary tract infection)   • Hyponatremia   • Acquired hypothyroidism   • Chronic diastolic CHF (congestive heart failure) (CMS/[facility])   • Enteritis due to Norovirus   • Coronary artery disease   • Spleen hematoma   • Anemia   • RBBB   • PAF (paroxysmal atrial fibrillation) (CMS/[facility])   • Chest pain, atypical   • Chronic obstructive pulmonary disease with acute exacerbation (CMS/[facility])   • Nausea & vomiting   • COPD exacerbation (CMS/[facility])   • Hypokalemia   • Chronic anticoagulation   • Hypoxia   • Chronic respiratory failure with hypoxia (CMS/[facility])   • Elevated d-dimer   • Pressure injury of coccygeal region, stage 2 (CMS/[facility])   • Acute vaginitis   • Primary osteoarthritis of both knees   • HTN (hypertension)     Past Medical History:   Diagnosis Date   • Arthritis    • Asthma    • CHF (congestive heart failure) (CMS/[facility])    • Disease of thyroid gland    • Fracture of hip (CMS/[facility])    • Hyperlipidemia    • Hypertension    • Primary osteoarthritis of both knees 1/3/2021   • Thyroid disease      Past Surgical History:   Procedure Laterality Date   • CHOLECYSTECTOMY     • HIP SURGERY     • HYSTERECTOMY     • JOINT REPLACEMENT     • SHOULDER SURGERY     • THYROID SURGERY     • TONSILLECTOMY           EDUCATION  The patient has been educated in the following areas:   Dysphagia (Swallowing Impairment) Modified Diet Instruction.    SLP Recommendation and Plan                                                              Plan of Care Reviewed With: patient  Outcome Summary: Clinical swallow eval completed as able.  Pt reports solid foods sticking in chest.  Denies difficulties with thin liquids through pudding consistency.  Pt had taken 2 bites of sandwich and 2 Boost drinks.  Prsented with wet, gurgly voice.  Pt able to clear with cough and swallow.  Given 2 single sips of water with no s/s aspiration (additional liquid not given d/t fluid restriction).  Pt took multiple bites of pudding with no s/s aspiration.  Pt reported no sticking of puree or water.  Would recommend puree foods, but pt has refused puree diet.  Will downgrade to [facility] soft, but feel pt will likely maintain nutrition with Boost and smooth foods such as applesauce, oatmeal, yogurt (does not like mashed potatoes).  Based on this eval feel pt's difficulties d/t esophageal component.  Consider GI consult for appropriate recommendations.         SLP Outcome Measures (last 72 hours)      SLP Outcome Measures     Row Name 01/05/21 1200             SLP Outcome Measures    Outcome Measure Used?  Adult NOMS  -         Adult FCM Scores    FCM Chosen  Swallowing  -      Swallowing FCM Score  4  -        User Key  (r) = Recorded By, (t) = Taken By, (c) = Cosigned By    Initials Name Effective Dates    Hafsa Cole MS CCC-SLP 03/07/18 -            Time Calculation:   Time Calculation- SLP     Row Name 01/05/21 1636             Time Calculation- SLP    SLP Start Time  1200  -      SLP Received On  01/05/21  -        User Key  (r) = Recorded By, (t) = Taken By, (c) = Cosigned By    Initials Name Provider Type    Hafsa Cole MS CCC-SLP Speech and Language Pathologist          Therapy Charges for Today     Code Description Service Date Service Provider Modifiers Qty    [id number] HC ST EVAL ORAL PHARYNG SWALLOW 5 1/5/2021 Hafsa Moncada MS CCC-SLP GN 1               Hafsa Moncada MS CCC-BALA  1/5/2021

## 2021-01-05 NOTE — CONSULTS
"Adult Nutrition  Assessment/PES    Patient Name:  Eduarda Lopez  YOB: 1931  MRN: [id number]  Admit Date:  12/30/2020    Assessment Date:  1/5/2021  Nutrition follow up/RN consult.  Per RN, patient only consuming liquids d/t patient reported food is getting stuck. Recommend Speech therapy consult. Noncompliant with fluid restriction and has been educated on the importance of following.  Will follow/monitor.   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\"           Labs/Tests/Procedures/Meds     Row Name 01/05/21 0998          Labs/Procedures/Meds    Lab Results Reviewed  reviewed, pertinent     Lab Results Comments  Na        Diagnostic Tests/Procedures    Diagnostic Test/Procedure Reviewed  reviewed, pertinent        Medications    Pertinent Medications Reviewed  reviewed, pertinent         Physical Findings     Row Name 01/05/21 0949          Physical Findings    Overall Physical Appearance  on oxygen therapy     Skin  poor skin integrity/turgor;pressure injury           Nutrition Prescription Ordered     Row Name 01/05/21 0955          Nutrition Prescription PO    Supplement  Boost Plus (Ensure Enlive, Ensure Plus)     Supplement Frequency  2 times a day     Common Modifiers  Cardiac;Fluid Restriction     Fluid Restriction mL per Day  -- 1200 mL         Evaluation of Received Nutrient/Fluid Intake     Row Name 01/05/21 0926          PO Evaluation    % PO Intake  not eating much, only drinking; noncompliant with fluid " restriction         Problem/Interventions:    Intervention Goal     Row Name 01/05/21 0952          Intervention Goal    General  Maintain nutrition;Meet nutritional needs for age/condition;Reduce/improve symptoms     PO  Tolerate PO;Increase intake     Weight  Maintain weight         Nutrition Intervention     Row Name 01/05/21 0952          Nutrition Intervention    RD/Tech Action  Care plan reviewd;Follow Tx progress           Education/Evaluation     Row Name 01/05/21 0952          Education    Education  Will Instruct as appropriate        Monitor/Evaluation    Monitor  Per protocol;I&O;PO intake;Supplement intake;Pertinent labs;Weight;Skin status           Electronically signed by:  Zhane Pizano RD  01/05/21 09:55 EST

## 2021-01-05 NOTE — PROGRESS NOTES
"   LOS: 6 days    Patient Care Team:  Lisseth Vasquez APRN as PCP - General (Family Medicine)  Inocencio Duff MD as Consulting Physician (Pulmonary Disease)    Chief Complaint:    Chief Complaint   Patient presents with   • Shortness of Breath   • Chest Pain     Follow UP hyponatremia  Subjective     Interval History:   Patient noncompliant with fluid restriction, but staff providing as she cannot get out of bed.  Nauseated if she tries to get up or turn to side.  Cough nonproductive.  Still feels soa.    Bowels moved .  Review of Systems:   See above .    Objective     Vital Signs  Temp:  [98 °F (36.7 °C)-99 °F (37.2 °C)] 98 °F (36.7 °C)  Heart Rate:  [73-84] 80  Resp:  [16-20] 19  BP: (114-163)/(73-92) 114/74    Flowsheet Rows      First Filed Value   Admission Height  172.7 cm (67.99\") Documented at 12/31/2020 1446   Admission Weight  81.5 kg (179 lb 10.8 oz) Documented at 12/30/2020 2300          No intake/output data recorded.  I/O last 3 completed shifts:  In: 2030 [P.O.:2030]  Out: 1200 [Urine:1200]    Intake/Output Summary (Last 24 hours) at 1/5/2021 1043  Last data filed at 1/5/2021 0658  Gross per 24 hour   Intake 1590 ml   Output 1200 ml   Net 390 ml       Physical Exam:  Elderly, very [place].  Edentulous.  No scleral icterus  Neck no jvd  Heart RRR no s3 or rub  Lungs decreased bs bases, no wheezing. Upper airway rhonchi  Abd + bs, soft, nontender  Ext no edema  Skin scattered ecchymoses, no rash  Neuro: [place].  SPeech fluent . Moves all 4 ext.       Results Review:    Results from last 7 days   Lab Units 01/05/21  0431 01/04/21  1338 01/04/21  0630 01/02/21  0846 01/01/21  0515   SODIUM mmol/L 123* 116* 118* 128* 129*   POTASSIUM mmol/L 4.0 3.3* 3.6 3.8 3.9   CHLORIDE mmol/L 85* 79* 81* 88* 93*   CO2 mmol/L 24.8 25.4 25.9 26.4 25.6   BUN mg/dL 18  --  16 20 14   CREATININE mg/dL 0.64  --  0.64 0.79 0.67   CALCIUM mg/dL 8.7  --  8.9 9.5 9.4   BILIRUBIN mg/dL  --   --  1.0 0.6 0.7   ALK PHOS U/L  --   " --  66 70 66   ALT (SGPT) U/L  --   --  24 17 9   AST (SGOT) U/L  --   --  28 31 23   GLUCOSE mg/dL 101*  --  96 144* 141*       Estimated Creatinine Clearance: 53.4 mL/min (by C-G formula based on SCr of 0.64 mg/dL).    Results from last 7 days   Lab Units 01/05/21  0431 01/01/21  0515   MAGNESIUM mg/dL  --  1.8   PHOSPHORUS mg/dL 3.0  --        Results from last 7 days   Lab Units 01/05/21 0431   URIC ACID mg/dL 3.4       Results from last 7 days   Lab Units 01/05/21  0431 01/04/21  0630 01/02/21  0846 01/01/21  0515 12/31/20  0337   WBC 10*3/mm3 15.79* 15.89* 12.12* 10.13 5.21   HEMOGLOBIN g/dL 11.9* 12.3 12.6 11.9* 12.8   PLATELETS 10*3/mm3 334 364 386 339 310               Imaging Results (Last 24 Hours)     ** No results found for the last 24 hours. **        apixaban, 5 mg, Oral, Q12H  arformoterol, 15 mcg, Nebulization, BID - RT  budesonide, 1 mg, Nebulization, Daily - RT  citalopram, 20 mg, Oral, Daily  dilTIAZem CD, 120 mg, Oral, Q24H  guaiFENesin, 1,200 mg, Oral, Q12H  hydrALAZINE, 10 mg, Oral, Q8H  ipratropium-albuterol, 3 mL, Nebulization, 4x Daily - RT  levothyroxine, 50 mcg, Oral, Q AM  melatonin, 5 mg, Oral, Nightly  miconazole, , Topical, Q12H  montelukast, 10 mg, Oral, Nightly  multivitamin, 1 tablet, Oral, Daily  oxybutynin XL, 10 mg, Oral, Daily  pantoprazole, 40 mg, Oral, Q AM  potassium chloride, 40 mEq, Oral, Daily  predniSONE, 40 mg, Oral, Daily With Breakfast  sucralfate, 1 g, Oral, 4x Daily AC & at Bedtime           Medication Review:   Current Facility-Administered Medications   Medication Dose Route Frequency Provider Last Rate Last Admin   • acetaminophen (TYLENOL) tablet 650 mg  650 mg Oral Q4H PRN Josie Beauchamp APRN   650 mg at 01/04/21 0410   • albuterol (ACCUNEB) nebulizer solution 0.63 mg  0.63 mg Nebulization Q4H PRN Anthony Soria MD   0.63 mg at 01/04/21 0435   • aluminum-magnesium hydroxide-simethicone (MAALOX MAX) 400-400-40 MG/5ML suspension 15 mL  15 mL Oral Q6H PRN  Josie Beauchamp APRN       • apixaban (ELIQUIS) tablet 5 mg  5 mg Oral Q12H Catracho Del Real MD   5 mg at 01/05/21 0903   • arformoterol (BROVANA) nebulizer solution 15 mcg  15 mcg Nebulization BID - RT Josie Beauchamp APRN   15 mcg at 01/05/21 0804   • bisacodyl (DULCOLAX) EC tablet 5 mg  5 mg Oral Daily PRN Josie Beauchamp APRN       • bisacodyl (DULCOLAX) suppository 10 mg  10 mg Rectal Daily PRN Josie Beauchamp APRN       • budesonide (PULMICORT) nebulizer solution 1 mg  1 mg Nebulization Daily - RT Shine Gonsalez MD   1 mg at 01/05/21 0804   • citalopram (CeleXA) tablet 20 mg  20 mg Oral Daily Josie Beauchamp APRN   20 mg at 01/05/21 0902   • dilTIAZem CD (CARDIZEM CD) 24 hr capsule 120 mg  120 mg Oral Q24H Josie Beauchamp APRN   120 mg at 01/05/21 0902   • guaiFENesin (MUCINEX) 12 hr tablet 1,200 mg  1,200 mg Oral Q12H Josie Beauchamp APRN   1,200 mg at 01/05/21 0902   • hydrALAZINE (APRESOLINE) tablet 10 mg  10 mg Oral Q8H Jaron Orozco APRN   10 mg at 01/05/21 0654   • ipratropium-albuterol (DUO-NEB) nebulizer solution 3 mL  3 mL Nebulization 4x Daily - RT Josie Beauchamp APRN   3 mL at 01/04/21 1526   • levothyroxine (SYNTHROID, LEVOTHROID) tablet 50 mcg  50 mcg Oral Q AM Josie Beauchamp APRN   50 mcg at 01/05/21 0654   • melatonin tablet 5 mg  5 mg Oral Nightly Sharad Green MD   5 mg at 01/04/21 2247   • miconazole (MICOTIN) 2 % cream   Topical Q12H Catracho Del Real MD   Given at 01/05/21 0903   • montelukast (SINGULAIR) tablet 10 mg  10 mg Oral Nightly Josie Beauchamp APRN   10 mg at 01/04/21 2247   • multivitamin (THERAGRAN) tablet 1 tablet  1 tablet Oral Daily Josie Beaucahmp APRN   1 tablet at 01/05/21 0902   • muscle rub (BenGay) 10-15 % cream   Topical 4x Daily PRN Catracho Del Real MD   Given at 01/05/21 0657   • ondansetron (ZOFRAN) tablet 4 mg  4 mg Oral Q6H PRN Josie Beauchamp APRN        Or   • ondansetron (ZOFRAN)  injection 4 mg  4 mg Intravenous Q6H PRN Josie Beauchamp APRELDA       • oxybutynin XL (DITROPAN-XL) 24 hr tablet 10 mg  10 mg Oral Daily Josie Beauchamp APRN   10 mg at 01/05/21 0902   • pantoprazole (PROTONIX) EC tablet 40 mg  40 mg Oral Q AM Josie Beauchamp APRN   40 mg at 01/05/21 0654   • potassium chloride (K-DUR,KLOR-CON) ER tablet 40 mEq  40 mEq Oral Daily Amilcar Sandoval MD   40 mEq at 01/05/21 0908   • predniSONE (DELTASONE) tablet 40 mg  40 mg Oral Daily With Breakfast Sharad Green MD   40 mg at 01/05/21 0902   • sodium chloride 0.9 % flush 10 mL  10 mL Intravenous PRN Nic Lewis MD       • sodium chloride 0.9 % flush 10 mL  10 mL Intravenous PRN Josie Beauchamp APRN   10 mL at 01/04/21 2247   • sucralfate (CARAFATE) tablet 1 g  1 g Oral 4x Daily AC & at Bedtime Josie Beauchamp APRN   1 g at 01/05/21 0654       Assessment/Plan   1. Hyponatremia, acute on chronic. Sodium better. 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  TSH compensated.  RN reports noncompliant with fluid restriction, but patient is unable to get out of bed so staff providing. Discussed not including ensure in fluids with RN.  Patient advised to stop drinking water, coffee, tea, free water.  Explained reasoning as best I could given Severe [place].  2. COPD exacerbation  3. Hypothryoid on replacement  4. Chronic diastolic heart failure compensated  5. Anemia                  Josie Cabello MD  01/05/21  10:43 EST

## 2021-01-05 NOTE — PROGRESS NOTES
Name: Eduarda Lopez ADMIT: 2020   : 1931  PCP: Lisseth Vasquez APRN    MRN: [id number] LOS: 6 days   AGE/SEX: 89 y.o. female  ROOM: [facility]     Subjective   Subjective   Resting in bed. Denies any current pain, nausea or vomiting. She is eating and drinking, but having trouble following liquid restrictions. No chest pain. Reports dyspnea. Still cannot get up secretions. No fever or chills.      Objective   Objective   Vital Signs  Temp:  [98 °F (36.7 °C)-98.6 °F (37 °C)] 98.3 °F (36.8 °C)  Heart Rate:  [73-84] 81  Resp:  [16-20] 20  BP: (114-148)/(73-84) 132/73  SpO2:  [93 %-96 %] 95 %  on  Flow (L/min):  [4] 4;   Device (Oxygen Therapy): humidified;nasal cannula  Body mass index is 27.33 kg/m².     Physical Exam  Constitutional:       General: She is not in acute distress.     Comments: Elderly, frail   HENT:      Head: Normocephalic and atraumatic.   Neck:      Musculoskeletal: Normal range of motion and neck supple.   Cardiovascular:      Rate and Rhythm: Normal rate.      Heart sounds: Normal heart sounds.   Pulmonary:      Effort: No respiratory distress.      Comments: Scattered coarse rhonchi throughout all lung fields  Abdominal:      General: Bowel sounds are normal.      Palpations: Abdomen is soft.   Musculoskeletal:      Right lower leg: No edema.      Left lower leg: No edema.   Skin:     General: Skin is warm and dry.   Neurological:      Mental Status: She is alert. Mental status is at baseline.      Cranial Nerves: No cranial nerve deficit.   Psychiatric:         Behavior: Behavior normal.         Thought Content: Thought content normal.     Results Review:       I reviewed the patient's new clinical results.  Results from last 7 days   Lab Units 21  04321  0630 21  0846 21  0515   WBC 10*3/mm3 15.79* 15.89* 12.12* 10.13   HEMOGLOBIN g/dL 11.9* 12.3 12.6 11.9*   PLATELETS 10*3/mm3 334 364 386 339     Results from last 7 days   Lab Units 21  01/04/21  1338 01/04/21  0630 01/02/21  0846 01/01/21  0515   SODIUM mmol/L 123* 116* 118* 128* 129*   POTASSIUM mmol/L 4.0 3.3* 3.6 3.8 3.9   CHLORIDE mmol/L 85* 79* 81* 88* 93*   CO2 mmol/L 24.8 25.4 25.9 26.4 25.6   BUN mg/dL 18  --  16 20 14   CREATININE mg/dL 0.64  --  0.64 0.79 0.67   GLUCOSE mg/dL 101*  --  96 144* 141*   Estimated Creatinine Clearance: 53.4 mL/min (by C-G formula based on SCr of 0.64 mg/dL).  Results from last 7 days   Lab Units 01/05/21  0431 01/04/21  0630 01/02/21  0846 01/01/21  0515 12/31/20  0337   ALBUMIN g/dL 3.60 4.00 4.00 4.10 4.10   BILIRUBIN mg/dL  --  1.0 0.6 0.7 1.0   ALK PHOS U/L  --  66 70 66 74   AST (SGOT) U/L  --  28 31 23 17   ALT (SGPT) U/L  --  24 17 9 10     Results from last 7 days   Lab Units 01/05/21  0431 01/04/21  0630 01/02/21  0846 01/01/21  0515   CALCIUM mg/dL 8.7 8.9 9.5 9.4   ALBUMIN g/dL 3.60 4.00 4.00 4.10   MAGNESIUM mg/dL  --   --   --  1.8   PHOSPHORUS mg/dL 3.0  --   --   --      Results from last 7 days   Lab Units 01/01/21  0515 12/30/20  1833   PROCALCITONIN ng/mL 0.06 0.08     No results found for: HGBA1C, POCGLU    apixaban, 5 mg, Oral, Q12H  arformoterol, 15 mcg, Nebulization, BID - RT  budesonide, 1 mg, Nebulization, Daily - RT  citalopram, 20 mg, Oral, Daily  dilTIAZem CD, 120 mg, Oral, Q24H  guaiFENesin, 1,200 mg, Oral, Q12H  hydrALAZINE, 10 mg, Oral, Q8H  ipratropium-albuterol, 3 mL, Nebulization, 4x Daily - RT  levothyroxine, 50 mcg, Oral, Q AM  melatonin, 5 mg, Oral, Nightly  miconazole, , Topical, Q12H  montelukast, 10 mg, Oral, Nightly  multivitamin, 1 tablet, Oral, Daily  oxybutynin XL, 10 mg, Oral, Daily  pantoprazole, 40 mg, Oral, Q AM  potassium chloride, 40 mEq, Oral, Daily  predniSONE, 40 mg, Oral, Daily With Breakfast  sucralfate, 1 g, Oral, 4x Daily AC & at Bedtime       Diet Regular; Cardiac, Daily Fluid Restriction; Other; 1,200       Assessment/Plan     Active Hospital Problems    Diagnosis  POA   • **COPD exacerbation  (CMS/[facility]) [J44.1]  Yes   • HTN (hypertension) [I10]  Unknown   • Acute vaginitis [N76.0]  No   • Primary osteoarthritis of both knees [M17.0]  Yes   • Hypokalemia [E87.6]  Yes   • Chronic anticoagulation [Z79.01]  Not Applicable   • Hypoxia [R09.02]  Yes   • Chronic respiratory failure with hypoxia (CMS/[facility]) [J96.11]  Yes   • Elevated d-dimer [R79.89]  Yes   • Pressure injury of coccygeal region, stage 2 (CMS/[facility]) [L89.152]  Yes   • PAF (paroxysmal atrial fibrillation) (CMS/[facility]) [I48.0]  Yes   • Anemia [D64.9]  Yes   • Coronary artery disease [I25.10]  Yes   • Chronic diastolic CHF (congestive heart failure) (CMS/[facility]) [I50.32]  Yes   • Acquired hypothyroidism [E03.9]  Yes   • Hyponatremia [E87.1]  Yes      Resolved Hospital Problems   No resolved problems to display.     89 y.o. female admitted with COPD exacerbation (CMS/[facility]).     COPD exacerbation (CMS/[facility])/chronic respiratory failure with hypoxia.  Pulmonology following--currently on 4L NC. Wears 2L at home.  Afebrile. Pulmonary hygiene with flutter valve and CPT QID. Mucus clearance still a problem. On scheduled nebulizers with Pulmicort/Brovana/Duonebs. Oral steroids continued. Continue to monitor. Leukocytosis could be steroid related.    Hyponatremia.  Nephrology following. 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 On fluid restriction. Continue to monitor.      HTN. BP stable. Continue to monitor.    Chronic diastolic CHF (congestive heart failure) (CMS/[facility]). Chest x-ray 12/30/2020 no focal pulmonary consolidation, pulmonary vasculature unremarkable.  proBNP >2600 on 1/4/2020 increased from >1700 on 12/30/2020--plan to defer diuretics to renal.    Coronary artery disease.  No evidence of ACS.    PAF (paroxysmal atrial fibrillation) (CMS/[facility]) /Chronic anticoagulation.  Rate controlled, diltiazem, apixaban.    Elevated d-dimer.  CTA chest report no evidence of pulmonary thromboembolism, multiple small areas of mild, groundglass opacity observable lungs  bilaterally, nonspecific.  VRP negative to include COVID-19.    Pressure injury of coccygeal region, stage 2 (CMS/HCC).  Wound care recommend follow hospital protocol for stage II pressure injury.     Acute vaginitis. Topicals in place.    Primary osteoarthritis of both knees.  Complicating physical therapy.     · apixaban adequate for DVT prophylaxis.  · CPR  · Discussed with patient.  · Anticipate discharge TBD / CCP following--plan home with granddaughter continue [facility] home health. Unclear if this is safe disposition as PT notes recommend SNF. Will see how she progresses.       CALEB Jarvis  [facility]  01/05/21  14:47 EST

## 2021-01-06 NOTE — PLAN OF CARE
"Goal Outcome Evaluation:  Plan of Care Reviewed With: patient  Progress: no change    Pt A+O x4. VSS with exception to needing to still be on 4L O2 NC. Lung sounds still abnormal. Pt continues to be disrespectful, irritable, and has become more impulsive this shift. 0100-Bed alarm going off due to pt \"needing to go sit up in the chair.\" Pt is very unsteady on feet and refuses for nursing staff to turn her because \"it makes it so hard to breath.\" She is reminded about her pressure injury that needs to heal. Pt frequently calls out to staff complaining about her joint pain and headache and needing PRN meds that have already been given. She will complain about how her heels are hurting, but refuse for them to be elevated. SCDs on briefly this shift as she refused that, too.    Fluid restriction is very challenging to enforce- pt becomes very agitated and will yell out loud disrupting the unit peace about how she \"needs coffee and water to function.\" There is a major barrier present with educating pt. Personality has been labile. She is very anxious and constantly repeating requests-call light has been used every hour. Pt has not slept at all this shift. So far, 960 mL fluid intake starting midnight. Pt did eat Jello- intake still poor due to lack of denture support. Voiding in Purewick-so far, 500 mL output. Pending further plan of care. I will continue to monitor and progress towards goals as tolerated.       Myra Thapa RN  "

## 2021-01-06 NOTE — PROGRESS NOTES
"   LOS: 7 days    Patient Care Team:  Lisseth Vasquez APRN as PCP - General (Family Medicine)  Inocencio Duff MD as Consulting Physician (Pulmonary Disease)    Chief Complaint:    Chief Complaint   Patient presents with   • Shortness of Breath   • Chest Pain     Follow UP hyponatremia  Subjective     Interval History:   Very upset about dysphagia diet and fluid restriction. Not coughing.  Sounds very congested.  [place], but yelling out because upset.  Bowels moving .  Review of Systems:   See above .    Objective     Vital Signs  Temp:  [97.9 °F (36.6 °C)-98.9 °F (37.2 °C)] 98.7 °F (37.1 °C)  Heart Rate:  [62-84] 62  Resp:  [16-20] 18  BP: (113-147)/(68-84) 136/80    Flowsheet Rows      First Filed Value   Admission Height  172.7 cm (67.99\") Documented at 12/31/2020 1446   Admission Weight  81.5 kg (179 lb 10.8 oz) Documented at 12/30/2020 2300          I/O this shift:  In: 238 [P.O.:238]  Out: -   I/O last 3 completed shifts:  In: 2320 [P.O.:2320]  Out: 1050 [Urine:1050]    Intake/Output Summary (Last 24 hours) at 1/6/2021 1308  Last data filed at 1/6/2021 1228  Gross per 24 hour   Intake 1838 ml   Output 1050 ml   Net 788 ml       Physical Exam:  Elderly, very [place].  Edentulous.  No scleral icterus.  Mask applied   Neck no jvd  Heart RRR no s3 or rub  Lungs decreased bs bases, no wheezing. Upper airway rhonchi, very congested.   Abd + bs, soft, nontender  Ext no edema lower ext   Skin scattered ecchymoses, no rash  Neuro: [place].  Speech fluent . Moves all 4 ext.       Results Review:    Results from last 7 days   Lab Units 01/06/21  0702 01/05/21  1522 01/05/21  0431  01/04/21  0630 01/02/21  0846   SODIUM mmol/L 123* 122* 123*   < > 118* 128*   POTASSIUM mmol/L 4.7 4.2 4.0   < > 3.6 3.8   CHLORIDE mmol/L 86* 86* 85*   < > 81* 88*   CO2 mmol/L 28.8 23.3 24.8   < > 25.9 26.4   BUN mg/dL 24* 23 18  --  16 20   CREATININE mg/dL 0.74 0.82 0.64  --  0.64 0.79   CALCIUM mg/dL 8.7 8.8 8.7  --  8.9 9.5   BILIRUBIN " mg/dL 0.6  --   --   --  1.0 0.6   ALK PHOS U/L 87  --   --   --  66 70   ALT (SGPT) U/L 20  --   --   --  24 17   AST (SGOT) U/L 21  --   --   --  28 31   GLUCOSE mg/dL 92 156* 101*  --  96 144*    < > = values in this interval not displayed.       Estimated Creatinine Clearance: 53.4 mL/min (by C-G formula based on SCr of 0.74 mg/dL).    Results from last 7 days   Lab Units 01/05/21  0431 01/01/21  0515   MAGNESIUM mg/dL  --  1.8   PHOSPHORUS mg/dL 3.0  --        Results from last 7 days   Lab Units 01/05/21 0431   URIC ACID mg/dL 3.4       Results from last 7 days   Lab Units 01/06/21  0702 01/05/21  0431 01/04/21  0630 01/02/21  0846 01/01/21  0515   WBC 10*3/mm3 17.42* 15.79* 15.89* 12.12* 10.13   HEMOGLOBIN g/dL 11.7* 11.9* 12.3 12.6 11.9*   PLATELETS 10*3/mm3 359 334 364 386 339               Imaging Results (Last 24 Hours)     ** No results found for the last 24 hours. **        apixaban, 5 mg, Oral, Q12H  arformoterol, 15 mcg, Nebulization, BID - RT  budesonide, 1 mg, Nebulization, Daily - RT  citalopram, 20 mg, Oral, Daily  dilTIAZem CD, 120 mg, Oral, Q24H  furosemide, 20 mg, Oral, BID  guaiFENesin, 1,200 mg, Oral, Q12H  hydrALAZINE, 10 mg, Oral, Q8H  ipratropium-albuterol, 3 mL, Nebulization, 4x Daily - RT  levothyroxine, 50 mcg, Oral, Q AM  melatonin, 5 mg, Oral, Nightly  miconazole, , Topical, Q12H  montelukast, 10 mg, Oral, Nightly  multivitamin, 1 tablet, Oral, Daily  oxybutynin XL, 10 mg, Oral, Daily  pantoprazole, 40 mg, Oral, Q AM  potassium chloride, 40 mEq, Oral, Daily  predniSONE, 40 mg, Oral, Daily With Breakfast  sodium chloride, 1 g, Oral, TID With Meals  sucralfate, 1 g, Oral, 4x Daily AC & at Bedtime           Medication Review:   Current Facility-Administered Medications   Medication Dose Route Frequency Provider Last Rate Last Admin   • acetaminophen (TYLENOL) tablet 650 mg  650 mg Oral Q4H PRN Josie Beauchamp APRN   650 mg at 01/06/21 0407   • albuterol (ACCUNEB) nebulizer solution  0.63 mg  0.63 mg Nebulization Q4H PRN Anthony Soria MD   0.63 mg at 01/04/21 0435   • aluminum-magnesium hydroxide-simethicone (MAALOX MAX) 400-400-40 MG/5ML suspension 15 mL  15 mL Oral Q6H PRN Josie Beauchamp APRN       • apixaban (ELIQUIS) tablet 5 mg  5 mg Oral Q12H Catracho Del Real MD   5 mg at 01/06/21 0841   • arformoterol (BROVANA) nebulizer solution 15 mcg  15 mcg Nebulization BID - RT Josie Baeuchamp APRN   15 mcg at 01/06/21 0736   • bisacodyl (DULCOLAX) EC tablet 5 mg  5 mg Oral Daily PRN Josie Beauchamp APRN       • bisacodyl (DULCOLAX) suppository 10 mg  10 mg Rectal Daily PRN Josie Beauchamp APRN       • budesonide (PULMICORT) nebulizer solution 1 mg  1 mg Nebulization Daily - RT Shine Gonsalez MD   1 mg at 01/06/21 0736   • citalopram (CeleXA) tablet 20 mg  20 mg Oral Daily Josie Beauchamp APRN   20 mg at 01/06/21 0842   • dilTIAZem CD (CARDIZEM CD) 24 hr capsule 120 mg  120 mg Oral Q24H Josie Beauchamp APRN   120 mg at 01/06/21 0841   • furosemide (LASIX) tablet 20 mg  20 mg Oral BID Josie Cabello MD   20 mg at 01/06/21 1218   • guaiFENesin (MUCINEX) 12 hr tablet 1,200 mg  1,200 mg Oral Q12H Josie Beauchamp APRN   1,200 mg at 01/06/21 0842   • hydrALAZINE (APRESOLINE) tablet 10 mg  10 mg Oral Q8H Jaron Orozco APRN   10 mg at 01/06/21 0639   • ipratropium-albuterol (DUO-NEB) nebulizer solution 3 mL  3 mL Nebulization 4x Daily - RT Josie Beauchamp APRN   3 mL at 01/06/21 1227   • levothyroxine (SYNTHROID, LEVOTHROID) tablet 50 mcg  50 mcg Oral Q AM Josie Beauchamp APRN   50 mcg at 01/06/21 0639   • melatonin tablet 5 mg  5 mg Oral Nightly Sharad Green MD   5 mg at 01/05/21 2111   • miconazole (MICOTIN) 2 % cream   Topical Q12H Catracho Del Real MD   Given at 01/06/21 0843   • montelukast (SINGULAIR) tablet 10 mg  10 mg Oral Nightly Josie Beauchamp APRN   10 mg at 01/05/21 2111   • multivitamin (THERAGRAN) tablet 1 tablet  1  tablet Oral Daily Josie Beauchamp APRN   1 tablet at 01/06/21 0842   • muscle rub (BenGay) 10-15 % cream   Topical 4x Daily PRN Catracho Del Real MD   Given at 01/06/21 0407   • ondansetron (ZOFRAN) tablet 4 mg  4 mg Oral Q6H PRN Josie Beauchamp APRN        Or   • ondansetron (ZOFRAN) injection 4 mg  4 mg Intravenous Q6H PRN Josie Beauchamp APRN       • oxybutynin XL (DITROPAN-XL) 24 hr tablet 10 mg  10 mg Oral Daily Josie Beauchamp APRN   10 mg at 01/06/21 0842   • pantoprazole (PROTONIX) EC tablet 40 mg  40 mg Oral Q AM Josie Beauchamp APRN   40 mg at 01/06/21 0639   • potassium chloride (K-DUR,KLOR-CON) ER tablet 40 mEq  40 mEq Oral Daily Amilcar Sandoval MD   40 mEq at 01/06/21 0842   • predniSONE (DELTASONE) tablet 40 mg  40 mg Oral Daily With Breakfast Sharad Green MD   40 mg at 01/06/21 0842   • sodium chloride 0.9 % flush 10 mL  10 mL Intravenous PRN Nic Lewis MD       • sodium chloride 0.9 % flush 10 mL  10 mL Intravenous PRN Josie Beauchamp APRN   10 mL at 01/05/21 2112   • sodium chloride tablet 1 g  1 g Oral TID With Meals Josie Cabello MD       • sucralfate (CARAFATE) tablet 1 g  1 g Oral 4x Daily AC & at Bedtime Josie Beauchamp APRN   1 g at 01/06/21 1218       Assessment/Plan   1. Hyponatremia, acute on chronic. Sodium unchanged. 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  TSH compensated.    Patient advised to stop drinking water, coffee, tea, free water.  Explained reasoning as best I could yesterday in setting of severe [place].  Add salt tablets and lasix today .  2. COPD exacerbation  3. Hypothryoid on replacement  4. Chronic diastolic heart failure compensated.  5. Anemia  6. Dysphagia.  On modified diet.      Unfortunately, patient is not going to be compliant with diet or fluid restriction outside the hospital.  LHA to address goals of care with granddaughter .          Josie Cabello MD  01/06/21  13:08 EST

## 2021-01-06 NOTE — PROGRESS NOTES
"    Name: Eduarda Lopez ADMIT: 2020   : 1931  PCP: Pedro Lisseth SHIELDS CALEB    MRN: [id number] LOS: 7 days   AGE/SEX: 89 y.o. female  ROOM: 91/1     Subjective   Subjective   Resting in bed. Very [place] but states she is hungry and \"I cannot do those pureed foods. I used to work at a [employer] and those are nasty.\" She now states she is not having trouble swallowing, but is having trouble getting \"stuff up.\" No chest pain. Breathing about the same as yesterday. Not getting any sputum up. No fever/chills. No nausea or vomiting.     Objective   Objective   Vital Signs  Temp:  [97.9 °F (36.6 °C)-98.9 °F (37.2 °C)] 98.7 °F (37.1 °C)  Heart Rate:  [68-84] 68  Resp:  [16-20] 18  BP: (113-147)/(68-84) 136/80  SpO2:  [94 %-99 %] 98 %  on  Flow (L/min):  [4] 4;   Device (Oxygen Therapy): nasal cannula;humidified  Body mass index is 27.33 kg/m².     Physical Exam  Constitutional:       General: She is not in acute distress.     Comments: Elderly, frail   HENT:      Head: Normocephalic and atraumatic.   Neck:      Musculoskeletal: Normal range of motion and neck supple.   Cardiovascular:      Rate and Rhythm: Normal rate.      Heart sounds: Normal heart sounds.   Pulmonary:      Effort: No respiratory distress.      Comments: Scattered coarse rhonchi throughout all lung fields  Abdominal:      General: Bowel sounds are normal.      Palpations: Abdomen is soft.   Musculoskeletal:      Right lower leg: No edema.      Left lower leg: No edema.   Skin:     General: Skin is warm and dry.   Neurological:      Mental Status: She is alert. Mental status is at baseline.       Cranial Nerves: No cranial nerve deficit.   Psychiatric:         Behavior: Irritable. Agitated.      Thought Content: Thought content normal.     Results Review:       I reviewed the patient's new clinical results.  Results from last 7 days   Lab Units 21  0702 21  0431 21  0630 21  0846   WBC 10*3/mm3 17.42* 15.79* 15.89* 12.12* "   HEMOGLOBIN g/dL 11.7* 11.9* 12.3 12.6   PLATELETS 10*3/mm3 359 334 364 386     Results from last 7 days   Lab Units 01/06/21 0702 01/05/21 1522 01/05/21 0431 01/04/21 1338 01/04/21 0630   SODIUM mmol/L 123* 122* 123* 116* 118*   POTASSIUM mmol/L 4.7 4.2 4.0 3.3* 3.6   CHLORIDE mmol/L 86* 86* 85* 79* 81*   CO2 mmol/L 28.8 23.3 24.8 25.4 25.9   BUN mg/dL 24* 23 18  --  16   CREATININE mg/dL 0.74 0.82 0.64  --  0.64   GLUCOSE mg/dL 92 156* 101*  --  96   Estimated Creatinine Clearance: 53.4 mL/min (by C-G formula based on SCr of 0.74 mg/dL).  Results from last 7 days   Lab Units 01/06/21 0702 01/05/21 0431 01/04/21 1338 01/04/21 0630 01/02/21  0846 01/01/21  0515   ALBUMIN g/dL 3.50 3.60 3.6 4.00 4.00 4.10   BILIRUBIN mg/dL 0.6  --   --  1.0 0.6 0.7   ALK PHOS U/L 87  --   --  66 70 66   AST (SGOT) U/L 21  --   --  28 31 23   ALT (SGPT) U/L 20  --   --  24 17 9     Results from last 7 days   Lab Units 01/06/21 0702 01/05/21 1522 01/05/21 0431 01/04/21 1338 01/04/21 0630 01/01/21  0515   CALCIUM mg/dL 8.7 8.8 8.7  --  8.9   < > 9.4   ALBUMIN g/dL 3.50  --  3.60 3.6 4.00   < > 4.10   MAGNESIUM mg/dL  --   --   --   --   --   --  1.8   PHOSPHORUS mg/dL  --   --  3.0  --   --   --   --     < > = values in this interval not displayed.     Results from last 7 days   Lab Units 01/01/21 0515 12/30/20  1833   PROCALCITONIN ng/mL 0.06 0.08     No results found for: HGBA1C, POCGLU    apixaban, 5 mg, Oral, Q12H  arformoterol, 15 mcg, Nebulization, BID - RT  budesonide, 1 mg, Nebulization, Daily - RT  citalopram, 20 mg, Oral, Daily  dilTIAZem CD, 120 mg, Oral, Q24H  guaiFENesin, 1,200 mg, Oral, Q12H  hydrALAZINE, 10 mg, Oral, Q8H  ipratropium-albuterol, 3 mL, Nebulization, 4x Daily - RT  levothyroxine, 50 mcg, Oral, Q AM  melatonin, 5 mg, Oral, Nightly  miconazole, , Topical, Q12H  montelukast, 10 mg, Oral, Nightly  multivitamin, 1 tablet, Oral, Daily  oxybutynin XL, 10 mg, Oral, Daily  pantoprazole, 40 mg, Oral,  Q AM  potassium chloride, 40 mEq, Oral, Daily  predniSONE, 40 mg, Oral, Daily With Breakfast  sucralfate, 1 g, Oral, 4x Daily AC & at Bedtime       Diet Dysphagia; II - Pureed; Thin; Cardiac, Daily Fluid Restriction; Other; 1,200       Assessment/Plan     Active Hospital Problems    Diagnosis  POA   • **COPD exacerbation (CMS/[facility]) [J44.1]  Yes   • HTN (hypertension) [I10]  Unknown   • Acute vaginitis [N76.0]  No   • Primary osteoarthritis of both knees [M17.0]  Yes   • Hypokalemia [E87.6]  Yes   • Chronic anticoagulation [Z79.01]  Not Applicable   • Hypoxia [R09.02]  Yes   • Chronic respiratory failure with hypoxia (CMS/[facility]) [J96.11]  Yes   • Elevated d-dimer [R79.89]  Yes   • Pressure injury of coccygeal region, stage 2 (CMS/[facility]) [L89.152]  Yes   • PAF (paroxysmal atrial fibrillation) (CMS/[facility]) [I48.0]  Yes   • Anemia [D64.9]  Yes   • Coronary artery disease [I25.10]  Yes   • Chronic diastolic CHF (congestive heart failure) (CMS/[facility]) [I50.32]  Yes   • Acquired hypothyroidism [E03.9]  Yes   • Hyponatremia [E87.1]  Yes      Resolved Hospital Problems   No resolved problems to display.     89 y.o. female admitted with COPD exacerbation (CMS/[facility]).     COPD exacerbation (CMS/[facility])/chronic respiratory failure with hypoxia.  Pulmonology following--currently on 4L NC. Wears 2L at home.  Afebrile. Pulmonary hygiene with flutter valve and CPT QID. Mucus clearance still a problem. On scheduled nebulizers with Pulmicort/Brovana/Duonebs. Oral steroids continued. Continue to monitor. Leukocytosis could be steroid related, but may need to consider repeat imaging if lungs do not improve. Will defer to Pulm.      Hyponatremia.  Nephrology following. 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 On fluid restriction. Continue to monitor.       HTN. BP stable. Continue to monitor.     Chronic diastolic CHF (congestive heart failure) (CMS/[facility]). Chest x-ray 12/30/2020 no focal pulmonary consolidation, pulmonary vasculature unremarkable.   proBNP >2600 on 1/4/2020 increased from >1700 on 12/30/2020--plan to defer diuretics to renal.     Coronary artery disease.  No evidence of ACS.     PAF (paroxysmal atrial fibrillation) (CMS/[facility]) /Chronic anticoagulation.  Rate controlled, diltiazem, apixaban.     Elevated d-dimer.  CTA chest report no evidence of pulmonary thromboembolism, multiple small areas of mild, groundglass opacity observable lungs bilaterally, nonspecific.  VRP negative to include COVID-19.     Pressure injury of coccygeal region, stage 2 (CMS/[facility]).  Wound care recommend follow hospital protocol for stage II pressure injury.      Acute vaginitis. Topicals in place.     Primary osteoarthritis of both knees.  Complicating physical therapy.    Dysphagia. ST evaluated. Recommends pureed and thins. Possible GI consultation recommended. Could consider esophagram. Patient very upset about her pureed diet.  Having trouble understanding the reason for this due to her [place]. Plan to call granddaughter to discuss goals of care.      · apixaban adequate for DVT prophylaxis.  · CPR  · Discussed with patient and nursing staff.   · Anticipate discharge TBD / CCP following--plan home with granddaughter continue [facility] home health. Unclear if this is safe disposition as PT notes recommend SNF. Will see how she progresses. Will update granddaughter here shortly.         CALEB Jarvis  [facility]  01/06/21  10:37 EST

## 2021-01-06 NOTE — PLAN OF CARE
Goal Outcome Evaluation:  Plan of Care Reviewed With: patient  Progress: no change  Outcome Summary: Pt denies any difficulties with food sticking today.  Says her only problem is breathing.  Unable to understand relationship of breathing difficulties and mucus to possible aspiration.  Pt extremely angry regarding diet (modified foods and fluid restriction).  Could perform VFSS to assess oropharyngeal swallow, but concerned regarding trying solids w/o esophageal study d/t recent c/o food sticking. Also note APRN note regarding possible esophagram and discussion regarding goals or care.  After goals of care determined and if vfss desired, please order..  .

## 2021-01-06 NOTE — PLAN OF CARE
Goal Outcome Evaluation:  Plan of Care Reviewed With: patient  Progress: no change  Outcome Summary: With encouragement, pt amb from chair to bed exhibiting SOA. Pt limited by fatigue, SOA and weakness. Pt exhibiting unsteadiness at times during gait although no LOB. Pt was talkative w/ O2 sats decr to 83% and encouraged to rest and breath through NC. Pt would benefit from SNF after dc to improve strength, mobility, and endurance.    Patient was not wearing a face mask during this therapy encounter. Therapist used appropriate personal protective equipment including eye protection, mask, and gloves.  Mask used was standard procedure mask. Appropriate PPE was worn during the entire therapy session. Hand hygiene was completed before and after therapy session. Patient is not in enhanced droplet precautions.

## 2021-01-06 NOTE — THERAPY TREATMENT NOTE
Acute Care - Speech Language Pathology   Swallow Treatment Note [facility]     Patient Name: Eduarda Lopez  : 1931  MRN: [id number]  Today's Date: 2021               Admit Date: 2020    Visit Dx:     ICD-10-CM ICD-9-CM   1. COPD exacerbation (CMS/[facility])  J44.1 491.21   2. Acute on chronic respiratory failure with hypoxia (CMS/[facility])  J96.21 518.84     799.02     Patient Active Problem List   Diagnosis   • S/P hip hemiarthroplasty   • Dislocation of hip prosthesis (CMS/[facility])   • Sepsis without acute organ dysfunction (CMS/[facility])   • Acute UTI (urinary tract infection)   • Hyponatremia   • Acquired hypothyroidism   • Chronic diastolic CHF (congestive heart failure) (CMS/[facility])   • Enteritis due to Norovirus   • Coronary artery disease   • Spleen hematoma   • Anemia   • RBBB   • PAF (paroxysmal atrial fibrillation) (CMS/[facility])   • Chest pain, atypical   • Chronic obstructive pulmonary disease with acute exacerbation (CMS/[facility])   • Nausea & vomiting   • COPD exacerbation (CMS/[facility])   • Hypokalemia   • Chronic anticoagulation   • Hypoxia   • Chronic respiratory failure with hypoxia (CMS/[facility])   • Elevated d-dimer   • Pressure injury of coccygeal region, stage 2 (CMS/[facility])   • Acute vaginitis   • Primary osteoarthritis of both knees   • HTN (hypertension)     Past Medical History:   Diagnosis Date   • Arthritis    • Asthma    • CHF (congestive heart failure) (CMS/[facility])    • Disease of thyroid gland    • Fracture of hip (CMS/[facility])    • Hyperlipidemia    • Hypertension    • Primary osteoarthritis of both knees 1/3/2021   • Thyroid disease      Past Surgical History:   Procedure Laterality Date   • CHOLECYSTECTOMY     • HIP SURGERY     • HYSTERECTOMY     • JOINT REPLACEMENT     • SHOULDER SURGERY     • THYROID SURGERY     • TONSILLECTOMY          SWALLOW EVALUATION (last 72 hours)      SLP Adult Swallow Evaluation     Row Name 21 1408 21 1500                Rehab Evaluation    Document Type  therapy note  (daily note)  -SA  evaluation  -SA       Subjective Information  complains of diet restrictions (puree food and liquid restriction)  -SA  --       Patient Observations  alert;poorly cooperative  -SA  --       Patient Effort  fair  -SA  --       Comment  Pt now denying food sticking  -SA  --       Symptoms Noted During/After Treatment  none  -SA  --          General Information    Patient Profile Reviewed  yes  -SA  --       Pertinent History Of Current Problem  COPD  -SA  --       Current Method of Nutrition  pureed;thin liquids  -SA  --       Precautions/Limitations, Vision  WFL with corrective lenses;for purposes of eval  -SA  --       Precautions/Limitations, Hearing  hearing impairment, bilaterally  -SA  --       Prior Level of Function-Communication  WFL  -SA  --       Prior Level of Function-Swallowing  no diet consistency restrictions  -SA  --       Plans/Goals Discussed with  patient;other (see comments) pt now denying food sticking  -SA  --       Barriers to Rehab  medically complex  -SA  --       Patient's Goals for Discharge  return to regular diet  -SA  --          Clinical Swallow Eval    Clinical Swallow Evaluation Summary  Pt without signficant wetness as noted yesterday.  Observed with yogurt, pudding, Boost without any s/s aspiration or change in vocal quality.  -SA  --          Clinical Impression    SLP Swallowing Diagnosis  other (see comments) suspect esophageal  -SA  --       Functional Impact  risk of aspiration/pneumonia  -SA  --       Rehab Potential/Prognosis, Swallowing  re-evaluate goals as necessary  -SA  --       Swallow Criteria for Skilled Therapeutic Interventions Met  no significant expected improvement in functional status  -SA  --          Recommendations    Therapy Frequency (Swallow)  PRN  -SA  --       Predicted Duration Therapy Intervention (Days)  until discharge  -SA  --       SLP Diet Recommendation  puree;thin liquids;other (see comments) MD notes possible discussionof  goals of care  -SA  --       Recommended Diagnostics  other (see comments) if desired, can do vfss to see if any impact on OP swallow  -SA  --       Recommended Precautions and Strategies  upright posture during/after eating;small bites of food and sips of liquid  -SA  --       Oral Care Recommendations  Oral Care BID/PRN  -SA  --       SLP Rec. for Method of Medication Administration  as tolerated  -SA  --       Monitor for Signs of Aspiration  notify SLP if any concerns  -SA  --       Anticipated Discharge Disposition (SLP)  unknown  -SA  --         User Key  (r) = Recorded By, (t) = Taken By, (c) = Cosigned By    Initials Name Effective Dates    Hafsa Cole, MS [facility]-SLP 03/07/18 -           EDUCATION  The patient has been educated in the following areas:   Dysphagia (Swallowing Impairment) Oral Care/Hydration Modified Diet Instruction.    SLP Recommendation and Plan  SLP Swallowing Diagnosis: other (see comments)(suspect esophageal)  SLP Diet Recommendation: puree, thin liquids, other (see comments)(MD notes possible discussionof goals of care)  Recommended Precautions and Strategies: upright posture during/after eating, small bites of food and sips of liquid  SLP Rec. for Method of Medication Administration: as tolerated     Monitor for Signs of Aspiration: notify SLP if any concerns  Recommended Diagnostics: other (see comments)(if desired, can do vfss to see if any impact on OP swallow)  Swallow Criteria for Skilled Therapeutic Interventions Met: no significant expected improvement in functional status  Anticipated Discharge Disposition (SLP): unknown  Rehab Potential/Prognosis, Swallowing: re-evaluate goals as necessary  Therapy Frequency (Swallow): PRN  Predicted Duration Therapy Intervention (Days): until discharge                         Plan of Care Reviewed With: patient  Outcome Summary: Pt denies any difficulties with food sticking today.  Says her only problem is breathing.  Unable to understand  relationship of breathing difficulties and mucus to possible aspiration.  Pt extremely angry regarding diet (modified foods and fluid restriction).  Could perform VFSS to assess oropharyngeal swallow, but concerned regarding trying solids w/o esophageal study d/t recent c/o food sticking. Also note APRN note regarding possible esophagram and discussion regarding goals or care.  After goals of care determined and if vfss desired, please order..  .         SLP Outcome Measures (last 72 hours)      SLP Outcome Measures     Row Name 01/05/21 1200             SLP Outcome Measures    Outcome Measure Used?  Adult NOMS  -         Adult FCM Scores    FCM Chosen  Swallowing  -      Swallowing FCM Score  4  -        User Key  (r) = Recorded By, (t) = Taken By, (c) = Cosigned By    Initials Name Effective Dates    Hafsa Cole MS CCC-BALA 03/07/18 -            Time Calculation:   Time Calculation- SLP     Row Name 01/06/21 1507             Time Calculation- SLP    SLP Start Time  1345  -      SLP Received On  01/06/21  -        User Key  (r) = Recorded By, (t) = Taken By, (c) = Cosigned By    Initials Name Provider Type    Hafsa Cole MS CCC-SLP Speech and Language Pathologist          Therapy Charges for Today     Code Description Service Date Service Provider Modifiers Qty    67769013097  ST EVAL ORAL PHARYNG SWALLOW 5 1/5/2021 Hafsa Moncada MS CCC-SLP GN 1    [id number] HC ST TREATMENT SWALLOW 5 1/6/2021 Hafsa Moncada MS CCC-BALA GN 1               Hafsa Moncada MS CCC-BALA  1/6/2021

## 2021-01-06 NOTE — THERAPY TREATMENT NOTE
Patient Name: Eduarda Lopez  : 1931    MRN: [id number]                              Today's Date: 2021       Admit Date: 2020    Visit Dx:     ICD-10-CM ICD-9-CM   1. COPD exacerbation (CMS/[facility])  J44.1 491.21   2. Acute on chronic respiratory failure with hypoxia (CMS/[facility])  J96.21 518.84     799.02     Patient Active Problem List   Diagnosis   • S/P hip hemiarthroplasty   • Dislocation of hip prosthesis (CMS/[facility])   • Sepsis without acute organ dysfunction (CMS/[facility])   • Acute UTI (urinary tract infection)   • Hyponatremia   • Acquired hypothyroidism   • Chronic diastolic CHF (congestive heart failure) (CMS/[facility])   • Enteritis due to Norovirus   • Coronary artery disease   • Spleen hematoma   • Anemia   • RBBB   • PAF (paroxysmal atrial fibrillation) (CMS/[facility])   • Chest pain, atypical   • Chronic obstructive pulmonary disease with acute exacerbation (CMS/[facility])   • Nausea & vomiting   • COPD exacerbation (CMS/[facility])   • Hypokalemia   • Chronic anticoagulation   • Hypoxia   • Chronic respiratory failure with hypoxia (CMS/[facility])   • Elevated d-dimer   • Pressure injury of coccygeal region, stage 2 (CMS/[facility])   • Acute vaginitis   • Primary osteoarthritis of both knees   • HTN (hypertension)     Past Medical History:   Diagnosis Date   • Arthritis    • Asthma    • CHF (congestive heart failure) (CMS/[facility])    • Disease of thyroid gland    • Fracture of hip (CMS/[facility])    • Hyperlipidemia    • Hypertension    • Primary osteoarthritis of both knees 1/3/2021   • Thyroid disease      Past Surgical History:   Procedure Laterality Date   • CHOLECYSTECTOMY     • HIP SURGERY     • HYSTERECTOMY     • JOINT REPLACEMENT     • SHOULDER SURGERY     • THYROID SURGERY     • TONSILLECTOMY       General Information     Row Name 21 1404          Physical Therapy Time and Intention    Document Type  therapy note (daily note)  -PH     Mode of Treatment  physical therapy  -PH     Row Name 21 1404          Safety Issues,  Functional Mobility    Impairments Affecting Function (Mobility)  balance;endurance/activity tolerance;strength;shortness of breath  -PH       User Key  (r) = Recorded By, (t) = Taken By, (c) = Cosigned By    Initials Name Provider Type    PH Jennifer Pinto PTA Physical Therapy Assistant        Mobility     Row Name 01/06/21 1404          Bed Mobility    Bed Mobility  sit-supine  -PH     Sit-Supine [place] (Bed Mobility)  minimum assist (75% patient effort);verbal cues;nonverbal cues (demo/gesture)  -PH     Comment (Bed Mobility)  UIC and returned to bed. Assist for BLE to bed.  -PH     Row Name 01/06/21 1404          Transfers    Comment (Transfers)  Pt req encouragement for amb - stating she was weak and had a lot of pain.  -PH     Row Name 01/06/21 1404          Sit-Stand Transfer    Sit-Stand [place] (Transfers)  minimum assist (75% patient effort);nonverbal cues (demo/gesture);verbal cues  -PH     Assistive Device (Sit-Stand Transfers)  walker, front-wheeled  -PH     Row Name 01/06/21 1404          Gait/Stairs (Locomotion)    [place] Level (Gait)  minimum assist (75% patient effort);verbal cues;nonverbal cues (demo/gesture)  -PH     Assistive Device (Gait)  walker, front-wheeled  -PH     Distance in Feet (Gait)  4' b>c  -PH     Deviations/Abnormal Patterns (Gait)  latrell decreased;gait speed decreased;stride length decreased  -PH     Bilateral Gait Deviations  forward flexed posture;heel strike decreased  -PH     Comment (Gait/Stairs)  step to patttern w/ unsteadiness noted. Pt exhibiting SOA / mouth breather. Assist w/ fww at times.  -PH       User Key  (r) = Recorded By, (t) = Taken By, (c) = Cosigned By    Initials Name Provider Type     Jennifer Pinto PTA Physical Therapy Assistant        Obj/Interventions     Row Name 01/06/21 1407          Motor Skills    Therapeutic Exercise  other (see comments) BLE HEP: AP, LAQ, abd w/ resist/add w/ pillow/ SAQ - all x 5 sec hold,  "SLR; all x 10 reps; assist for SLR; pt talkative w/ decr to 83% x 2 and req a few min incr to  89%  -PH     Row Name 01/06/21 1407          Balance    Balance Assessment  sitting static balance;standing static balance  -PH     Static Sitting Balance  WFL  -PH     Static Standing Balance  WFL  -PH     Comment, Balance  Pt CGA for stand bal and SV for sit bal.  -PH       User Key  (r) = Recorded By, (t) = Taken By, (c) = Cosigned By    Initials Name Provider Type    Jennifer Gutierrez, RAI Physical Therapy Assistant        Goals/Plan    No documentation.       Clinical Impression     Row Name 01/06/21 1408          Pain    Additional Documentation  Pain Scale: FACES Pre/Post-Treatment (Group)  -     Row Name 01/06/21 1408          Pain Scale: Numbers Pre/Post-Treatment    Pain Intervention(s)  Rest;Repositioned  -     Row Name 01/06/21 1408          Pain Scale: FACES Pre/Post-Treatment    Pre/Posttreatment Pain Comment  pt would not rate and stated, \"I hurt all over all the time\".  -PH     Row Name 01/06/21 1408          Plan of Care Review    Plan of Care Reviewed With  patient  -PH     Progress  no change  -PH     Outcome Summary  With encouragement, pt amb from chair to bed exhibiting SOA. Pt limited by fatigue, SOA and weakness. Pt exhibiting unsteadiness at times during gait although no LOB. Pt was talkative w/ O2 sats decr to 83% and encouraged to rest and breath through NC. Pt would benefit from SNF after dc to improve strength, mobility, and endurance.  -PH     Row Name 01/06/21 1408          Vital Signs    Pre SpO2 (%)  97  -PH     O2 Delivery Pre Treatment  nasal cannula  -PH     Intra SpO2 (%)  83  -PH     O2 Delivery Intra Treatment  nasal cannula  -PH     Post SpO2 (%)  97  -PH     O2 Delivery Post Treatment  nasal cannula  -PH     Row Name 01/06/21 1408          Positioning and Restraints    Pre-Treatment Position  sitting in chair/recliner  -PH     Post Treatment Position  bed  -PH     In Bed "  fowlers;call light within reach;encouraged to call for assist;exit alarm on  -PH       User Key  (r) = Recorded By, (t) = Taken By, (c) = Cosigned By    Initials Name Provider Type    Jennifer Gutierrez PTA Physical Therapy Assistant        Outcome Measures     Row Name 01/06/21 1412          How much help from another person do you currently need...    Turning from your back to your side while in flat bed without using bedrails?  3  -PH     Moving from lying on back to sitting on the side of a flat bed without bedrails?  2  -PH     Moving to and from a bed to a chair (including a wheelchair)?  3  -PH     Standing up from a chair using your arms (e.g., wheelchair, bedside chair)?  3  -PH     Climbing 3-5 steps with a railing?  1  -PH     To walk in hospital room?  2  -PH     AM-PAC 6 Clicks Score (PT)  14  -PH     Row Name 01/06/21 1412          Functional Assessment    Outcome Measure Options  AM-PAC 6 Clicks Basic Mobility (PT)  -PH       User Key  (r) = Recorded By, (t) = Taken By, (c) = Cosigned By    Initials Name Provider Type    Jennifer Gutierrez PTA Physical Therapy Assistant        Physical Therapy Education                 Title: PT OT SLP Therapies (Done)     Topic: Physical Therapy (Done)     Point: Mobility training (Done)     Learning Progress Summary           Patient Acceptance, E,D, VU,NR by SUBHASH at 1/6/2021 1412    Acceptance, E, VU,NR by YEYO at 1/5/2021 1506    Acceptance, E, NR by VAHE at 1/5/2021 1433    Acceptance, E,D, NR by DIANE at 1/3/2021 1145    Acceptance, E, VU,NR by PAVITHRA at 1/2/2021 1240    Acceptance, E,D, VU,NR by PAVITHRA at 1/1/2021 1629                   Point: Home exercise program (Done)     Learning Progress Summary           Patient Acceptance, E,D, VU,NR by SUBHASH at 1/6/2021 1412    Acceptance, E, VU,NR by YEYO at 1/5/2021 1506    Acceptance, E, NR by VAHE at 1/5/2021 1433    Acceptance, E,D, NR by DIANE at 1/3/2021 1145    Acceptance, E, VU,NR by PAVITHRA at 1/2/2021 1240    Acceptance,  E,D, VU,NR by  at 1/1/2021 1629                   Point: Body mechanics (Done)     Learning Progress Summary           Patient Acceptance, E,D, VU,NR by PH at 1/6/2021 1412    Acceptance, E, VU,NR by AP at 1/5/2021 1506    Acceptance, E, NR by RB at 1/5/2021 1433    Acceptance, E,D, NR by  at 1/3/2021 1145    Acceptance, E, VU,NR by KH at 1/2/2021 1240    Acceptance, E,D, VU,NR by KH at 1/1/2021 1629                   Point: Precautions (Done)     Learning Progress Summary           Patient Acceptance, E,D, VU,NR by PH at 1/6/2021 1412    Acceptance, E, VU,NR by AP at 1/5/2021 1506    Acceptance, E, NR by RB at 1/5/2021 1433    Acceptance, E,D, NR by DIANE at 1/3/2021 1145    Acceptance, E, VU,NR by  at 1/2/2021 1240    Acceptance, E,D, VU,NR by  at 1/1/2021 1629                               User Key     Initials Effective Dates Name Provider Type Discipline     02/05/19 -  Vicky Callahan, PT Physical Therapist PT     03/07/18 -  Ani Romero PTA Physical Therapy Assistant PT    RB 01/18/17 -  Nate Sarmiento, RN Registered Nurse Nurse     08/20/19 -  Jennifer Pinto PTA Physical Therapy Assistant PT    AP 09/24/20 -  Belle Hirsch PT Physical Therapist PT              PT Recommendation and Plan     Plan of Care Reviewed With: patient  Progress: no change  Outcome Summary: With encouragement, pt amb from chair to bed exhibiting SOA. Pt limited by fatigue, SOA and weakness. Pt exhibiting unsteadiness at times during gait although no LOB. Pt was talkative w/ O2 sats decr to 83% and encouraged to rest and breath through NC. Pt would benefit from SNF after dc to improve strength, mobility, and endurance.     Time Calculation:   PT Charges     Row Name 01/06/21 1413             Time Calculation    Start Time  1301  -PH      Stop Time  1324  -PH      Time Calculation (min)  23 min  -PH      PT Received On  01/06/21  -PH      PT - Next Appointment  01/07/21  -PH        User Key  (r) =  Recorded By, (t) = Taken By, (c) = Cosigned By    Initials Name Provider Type    PH Jennifer Pinto PTA Physical Therapy Assistant        Therapy Charges for Today     Code Description Service Date Service Provider Modifiers Qty    [id number] HC PT THER PROC EA 15 MIN 1/6/2021 Jennifer Pinto PTA GP 2          PT G-Codes  Outcome Measure Options: AM-PAC 6 Clicks Basic Mobility (PT)  AM-PAC 6 Clicks Score (PT): 14    Jennifer Pinto PTA  1/6/2021

## 2021-01-06 NOTE — SIGNIFICANT NOTE
"Called and discussed patient's plan of care with granddaughter and POBreanna POWELL. Explained patient's anger regarding her modified diet as well as continued noncompliance with fluid restriction. Breanna states the patient is often angry in general. \"That's just her.\" I discussed code status and life saving measures. The patient does have a living will as well as POA. Breanna states her grandmother would want CPR and intubation. Intubation would be wanted in the immediate setting but then they would further discuss as she would not want prolonged ventilation. Her grandmother would not want artifical nutrition. Explained that in order for the patient to be able to eat what she wants/drink how much she wants that code status would need to be reconsidered in order to promote quality over quantity. Patient continually wants regular diet and fluids, but does not fully grasp the consequences of allowing these things. Granddaughter reports understanding and would like us to continue care with current medical recommendations. I did discuss long-term considerations in an 89 female with co-morbidities including chronic lung disease. Granddaughter is agreeable to speaking with palliative to promote further conservations about long-term goals. Will consult. Otherwise, patient remains a full code at this time with guarded prognosis if she is unable to comply with medical recommendations.     Breanna to call and speak with her grandmother for further discussion. Will follow up tomorrow.  "

## 2021-01-06 NOTE — PROGRESS NOTES
"                                              LOS: 6 days   Patient Care Team:  Lisseth Vasquez APRN as PCP - General (Family Medicine)  Inocencio Duff MD as Consulting Physician (Pulmonary Disease)    Chief Complaint:  F/up COPD exacerbation, respiratory failure and medical problems listed below    Subjective   Interval History  Continues to report shortness of breath  Activities and still requiring 4 L/min.  Has mild cough but still unable to produce phlegm.    REVIEW OF SYSTEMS:     Constitutional: No fever or chills  GASTROINTESTINAL: No anorexia, nausea, vomiting or diarrhea. No abdominal pain or blood.       Ventilator/Non-Invasive Ventilation Settings (From admission, onward)    None                Physical Exam:     Vital Signs  Temp:  [97.9 °F (36.6 °C)-98.7 °F (37.1 °C)] 98.7 °F (37.1 °C)  Heart Rate:  [73-84] 84  Resp:  [17-20] 20  BP: (113-148)/(68-84) 124/68    Intake/Output Summary (Last 24 hours) at 1/5/2021 2148  Last data filed at 1/5/2021 2122  Gross per 24 hour   Intake 1600 ml   Output 550 ml   Net 1050 ml     Flowsheet Rows      First Filed Value   Admission Height  172.7 cm (67.99\") Documented at 12/31/2020 1446   Admission Weight  81.5 kg (179 lb 10.8 oz) Documented at 12/30/2020 2300          General Appearance:   Alert, cooperative, in no acute distress   ENMT:  Mallampati score 3, moist mucous membrane   Eyes:  Pupils equal and reactive to light. EOMI   Neck:   Large. Trachea midline. No thyromegaly.   Lungs:    Persistent bilateral rhonchi and expiratory wheezing.  Nonlabored breathing    Heart:   Regular rhythm and normal rate, normal S1 and S2, no         murmur   Skin:   No abnormalities observed   Abdomen:    Obese. Soft. No tenderness. No HSM.   Neuro:  Conscious, alert, oriented x3. Strength 5/5 in upper and lower  ext   Extremities:  No cyanosis, clubbing or edema.  Warm extremities and well-perfused          Results Review:        Results from last 7 days   Lab Units " 01/05/21  1522 01/05/21  0431 01/04/21  1338 01/04/21  0630   SODIUM mmol/L 122* 123* 116* 118*   POTASSIUM mmol/L 4.2 4.0 3.3* 3.6   CHLORIDE mmol/L 86* 85* 79* 81*   CO2 mmol/L 23.3 24.8 25.4 25.9   BUN mg/dL 23 18  --  16   CREATININE mg/dL 0.82 0.64  --  0.64   GLUCOSE mg/dL 156* 101*  --  96   CALCIUM mg/dL 8.8 8.7  --  8.9     Results from last 7 days   Lab Units 12/30/20  1833   TROPONIN T ng/mL <0.010     Results from last 7 days   Lab Units 01/05/21  0431 01/04/21  0630 01/02/21  0846   WBC 10*3/mm3 15.79* 15.89* 12.12*   HEMOGLOBIN g/dL 11.9* 12.3 12.6   HEMATOCRIT % 34.6 36.7 37.9   PLATELETS 10*3/mm3 334 364 386         Results from last 7 days   Lab Units 01/04/21  0630   PROBNP pg/mL 2,662.0*       I reviewed the patient's new clinical results.        Medication Review:   apixaban, 5 mg, Oral, Q12H  arformoterol, 15 mcg, Nebulization, BID - RT  budesonide, 1 mg, Nebulization, Daily - RT  citalopram, 20 mg, Oral, Daily  dilTIAZem CD, 120 mg, Oral, Q24H  guaiFENesin, 1,200 mg, Oral, Q12H  hydrALAZINE, 10 mg, Oral, Q8H  ipratropium-albuterol, 3 mL, Nebulization, 4x Daily - RT  levothyroxine, 50 mcg, Oral, Q AM  melatonin, 5 mg, Oral, Nightly  miconazole, , Topical, Q12H  montelukast, 10 mg, Oral, Nightly  multivitamin, 1 tablet, Oral, Daily  oxybutynin XL, 10 mg, Oral, Daily  pantoprazole, 40 mg, Oral, Q AM  potassium chloride, 40 mEq, Oral, Daily  predniSONE, 40 mg, Oral, Daily With Breakfast  sucralfate, 1 g, Oral, 4x Daily AC & at Bedtime             Diagnostic imaging:  I personally and independently reviewed the following images:  CT chest 12/31/2020: Mosaic groundglass opacities.      Assessment   1. COPD exacerbation  2. Acute on chronic hypoxic respiratory failure, on oxygen 2 l/min at baseline  3. Chronic diastolic CHF  4. Paroxysmal A. fib  5. Acute on chronic hyponatremia  6. Abnormal CT chest: Mosaic attenuation mostly consistent with peripheral obstruction  7. Steroid-induced  hyperglycemia      Plan     · Pulmicort and Brovana twice daily.  DuoNeb 4 times a day  · Mucinex to improve mucus clearance  · Encouraged to use flutter and I-S  · Prednisone 40 mg daily, day 2 and will taper gradually when she improved.  She is still wheezing.  · Fluid restriction per nephrology  · Insulin PRN for hyperglycemia  · Eliquis for A. fib and VTE prophylaxis    Octavio Lino MD  01/05/21  21:48 EST            This note was dictated utilizing Dragon dictation

## 2021-01-06 NOTE — PLAN OF CARE
Goal Outcome Evaluation:  Plan of Care Reviewed With: patient  Progress: no change  Outcome Summary: Oxygen turned down to 2.5. Fluid count as of now @1749 is @958. Will reserve remaining for Marco Antonioboriso for tonight to please Pt. APRN discussion with family about comfort measures so that Pt could eat what she wanted. Family wants Pt to remain Full Code. Current diet remains in place. Pt continues to yell out and is furious with diet and fluid count. Palliative consult placed for future goals conversation with family. Swallow study to be completed tomorrow.

## 2021-01-07 NOTE — CONSULTS
"Purpose of the visit was to evaluate for: goals of care/advanced care planning, support for patient/family, hospice referral/discussion, pain/symptom management, transfer to comfort care bed/unit and comfort care. Spoke with MD and RN as well as patient and family and discussed palliative care, goals of care, resuscitation status and [facility].      Assessment:  Patient is palliative care appropriate given COPD, hyponatremia, CHF, PAF, dysphagia. Patient had c/o pain all over and shortness of air. Patient presents as somewhat anxious. PPS: 40%. Psychosocial Acuity: 1-normal complexity. Spiritual Acuity: 1-normal complexity.     Recommendations/Plan: transfer to [facility] for palliative care. Goal is home with hospice if possible.     Other Comments:   Palliative Care spoke to patient and patient's granddgtrTiffanyBreanna (POA) regarding GOC and psychosocial support. Patient and POA voiced understanding of current condition and prognosis. Patient stated \"I don't think I'm going to make it out of here.\" Patient stated several times that she wants to be comfortable and feel like she can breath. Breanna concerned about patient's declining status and wants to honor her wishes and promoting a better quality of life and spending more quality time w/patient. Patient stated that her biggest worry is Breanna \"being torn up without me.\"     Discussed inpatient palliative care unit, hospice services, explained symptom management, comfort care, and expectations as patient nears end of life.    Addressed code status. Patient and POA both confirm No resuscitation efforts and comfort measures only. They agree in no escalation in care but to focus treatment on symptom management and comfort only.  POA agrees to transfer to  for comfort with ultimate goal of going home with hospice. She agrees to hospice consult. Provided psychosocial support.   "

## 2021-01-07 NOTE — PROGRESS NOTES
"                                              LOS: 7 days   Patient Care Team:  Lisseth Vasquez APRN as PCP - General (Family Medicine)  Inocencio Duff MD as Consulting Physician (Pulmonary Disease)    Chief Complaint:  F/up COPD exacerbation, respiratory failure and medical problems listed below    Subjective   Interval History  Was very upset tonight due to her diet.  She stated that she requested oatmeal and a sandwich and has not received them yet.  She continues to report shortness of breath and cough productive of thick phlegm at times, more so when she received a nebulizer treatment    REVIEW OF SYSTEMS:     Constitutional: No fever or chills  GASTROINTESTINAL: No anorexia, nausea, vomiting or diarrhea. No abdominal pain or blood.       Ventilator/Non-Invasive Ventilation Settings (From admission, onward)    None                Physical Exam:     Vital Signs  Temp:  [98.2 °F (36.8 °C)-98.9 °F (37.2 °C)] 98.2 °F (36.8 °C)  Heart Rate:  [59-83] 67  Resp:  [16-18] 16  BP: (123-147)/(72-84) 126/73    Intake/Output Summary (Last 24 hours) at 1/6/2021 2317  Last data filed at 1/6/2021 1718  Gross per 24 hour   Intake 1068 ml   Output 950 ml   Net 118 ml     Flowsheet Rows      First Filed Value   Admission Height  172.7 cm (67.99\") Documented at 12/31/2020 1446   Admission Weight  81.5 kg (179 lb 10.8 oz) Documented at 12/30/2020 2300          General Appearance:   Alert, cooperative, in no acute distress   ENMT:  Mallampati score 3, moist mucous membrane   Eyes:  Pupils equal and reactive to light. EOMI   Neck:   Large. Trachea midline. No thyromegaly.   Lungs:    Worsening bilateral expiratory wheezing and coarse breath sounds.  Nonlabored breathing    Heart:   Regular rhythm and normal rate, normal S1 and S2, no         murmur   Skin:   No abnormalities observed   Abdomen:    Obese. Soft. No tenderness. No HSM.   Neuro:  Conscious, alert, oriented x3. Strength 5/5 in upper and lower  ext   Extremities:  " No cyanosis, clubbing or edema.  Warm extremities and well-perfused          Results Review:        Results from last 7 days   Lab Units 01/06/21  0702 01/05/21  1522 01/05/21  0431   SODIUM mmol/L 123* 122* 123*   POTASSIUM mmol/L 4.7 4.2 4.0   CHLORIDE mmol/L 86* 86* 85*   CO2 mmol/L 28.8 23.3 24.8   BUN mg/dL 24* 23 18   CREATININE mg/dL 0.74 0.82 0.64   GLUCOSE mg/dL 92 156* 101*   CALCIUM mg/dL 8.7 8.8 8.7         Results from last 7 days   Lab Units 01/06/21  0702 01/05/21  0431 01/04/21  0630   WBC 10*3/mm3 17.42* 15.79* 15.89*   HEMOGLOBIN g/dL 11.7* 11.9* 12.3   HEMATOCRIT % 35.0 34.6 36.7   PLATELETS 10*3/mm3 359 334 364         Results from last 7 days   Lab Units 01/04/21  0630   PROBNP pg/mL 2,662.0*       I reviewed the patient's new clinical results.        Medication Review:   apixaban, 5 mg, Oral, Q12H  arformoterol, 15 mcg, Nebulization, BID - RT  budesonide, 1 mg, Nebulization, Daily - RT  citalopram, 20 mg, Oral, Daily  dilTIAZem CD, 120 mg, Oral, Q24H  furosemide, 20 mg, Oral, BID  guaiFENesin, 1,200 mg, Oral, Q12H  hydrALAZINE, 10 mg, Oral, Q8H  ipratropium-albuterol, 3 mL, Nebulization, 4x Daily - RT  levothyroxine, 50 mcg, Oral, Q AM  melatonin, 5 mg, Oral, Nightly  miconazole, , Topical, Q12H  montelukast, 10 mg, Oral, Nightly  multivitamin, 1 tablet, Oral, Daily  oxybutynin XL, 10 mg, Oral, Daily  pantoprazole, 40 mg, Oral, Q AM  predniSONE, 40 mg, Oral, Daily With Breakfast  sodium chloride, 1 g, Oral, TID With Meals  sucralfate, 1 g, Oral, 4x Daily AC & at Bedtime             Diagnostic imaging:  I personally and independently reviewed the following images:  CT chest 12/31/2020: Mosaic groundglass opacities.      Assessment   1. COPD exacerbation  2. Acute on chronic hypoxic respiratory failure, on oxygen 2 l/min at baseline  3. Chronic diastolic CHF  4. Paroxysmal A. fib  5. Acute on chronic hyponatremia  6. Abnormal CT chest: Mosaic attenuation mostly consistent with peripheral  obstruction  7. Steroid-induced hyperglycemia      Plan     · Pulmicort and Brovana twice daily.  DuoNeb 4 times a day  · Mucinex to improve mucus clearance  · Encouraged to use flutter and I-S  · Prednisone 40 mg daily, day 3 and will taper gradually when she improved.  She is still wheezing.  · Initiate theophylline due to persistent wheezing  · CPT 4 times a day  · Insulin PRN for hyperglycemia  · Eliquis for A. fib and VTE prophylaxis    Octavio Lino MD  01/06/21  23:17 EST            This note was dictated utilizing Dragon dictation

## 2021-01-07 NOTE — SIGNIFICANT NOTE
01/07/21 1523   Rehab Time/Intention   Session Not Performed patient/family declined treatment  (Pt politely stated that she still wants PT, but is too fatigued today after being LILY for tests. PT will follow up tomorrow.)

## 2021-01-07 NOTE — DISCHARGE PLACEMENT REQUEST
"Eduarda Chen (89 y.o. Female)     Date of Birth Social Security Number Address Home Phone MRN    12/27/1931  [address]  [facility] [phone] [id number]    Sikh Marital Status          Judaism        Admission Date Admission Type Admitting Provider Attending Provider Department, Room/Bed    12/30/20 Emergency Catracho Del Real MD Furlow, Stephen Matthew, MD [address], P591/1    Discharge Date Discharge Disposition Discharge Destination                       Attending Provider: Andrew Mast MD    Allergies: Codeine, Ibuprofen, Morphine And Related, Penicillins    Isolation: None   Infection: None   Code Status: CPR    Ht: 172.7 cm (67.99\")   Wt: 81.5 kg (179 lb 10.8 oz)    Admission Cmt: None   Principal Problem: COPD exacerbation (CMS/[facility]) [J44.1]                 Active Insurance as of 12/30/2020     Primary Coverage     Payor Plan Insurance Group Employer/Plan Group    MEDICARE MEDICARE A & B      Payor Plan Address Payor Plan Phone Number Payor Plan Fax Number Effective Dates    [address] [phone]  8/1/1994 - None Entered    [facility] [id number]       Subscriber Name Subscriber Birth Date Member ID       EDUARDA CHEN 12/27/1931 [id number]           Secondary Coverage     Payor Plan Insurance Group Employer/Plan Group    [place]      Payor Plan Address Payor Plan Phone Number Payor Plan Fax Number Effective Dates    ATTN CLAIMS DEPT [phone]  1/1/2016 - None Entered    [address] [place] [id number]       Subscriber Name Subscriber Birth Date Member ID       EDUARDA CHEN 12/27/1931 [id number]                 Emergency Contacts      (Rel.) Home Phone Work Phone Mobile Phone    AdielTiffanyBreanna( GRANDDAUGHTER) (Power of ) -- [phone] --    Federico De Andanateval (Daughter) -- -- [phone]    Lilian Chen (Other) [phone] -- --            {Outbreak/Travel/Exposure " Documentation......;  Question Available Choices Patient Response   Outbreak Screen: Do you currently have a new onset of the following symptoms?        Fever/Chils, Cough, Shortness of air, Loss of taste or smell, No, Unknown  (!) Cough;Shortness of Air (12/30/20 1816)   Outbreak Screen: In the last 14 days, have you had contact with anyone who is ill, has show any of the symptoms listed above and/or has been diagnosis with the 2019 Novel Coronavirus? This includes any immediate household members but excludes any patients with whom you have been in contact within your normal work duties wearing proper PPE, if you are a healthcare worker.  Yes, No, Unknown              No (12/30/20 1816)   Outbreak Screen: Who was notified?    Free text  (not recorded)   Travel Screen: Have you traveled in the last month? If so, to what country have you traveled? If [place] what state? Yes, No, Unknown  List of all countries  List of all States No (12/30/20 1816)  (not recorded)  (not recorded)   Infection Risk: Do you currently have the following symptoms?  (If cough is selected, the Tuberculosis Screen is performed.) Cough, Fever, Rash, No (!) Cough (12/30/20 1816)   Tuberculosis Screen: Do you have any of the following Tuberculosis Risks?  · Have you lived or spent time with anyone who had or may have TB?  · Have you lived in or visited any of the following areas for more than one month: [place], [place], [place], [place] or [place], the [place] or [place]?  · Do you have HIV/AIDS?  · Have you lived in or worked in a nursing home, homeless shelter, correctional facility, or substance abuse treatment facility?   · No    If Yes do you have any of the following symptoms? Yes responses display to the right    If Yes, symptoms listed are:  Cough greater than or equal to 3 weeks, Loss of appetite, Unexplained weight loss, Night sweats, Bloody sputum or hemoptysis, Hoarseness, Fever, Fatigue, Chest pain, No No (12/30/20 1816)  (not  recorded)   Exposure Screen: Have you been exposed to any of these contagious diseases in the last month? Measles, Chickenpox, Meningitis, Pertussis, Whooping Cough, No No (12/30/20 0426)

## 2021-01-07 NOTE — SIGNIFICANT NOTE
01/07/21 1112   OTHER   Discipline physical therapy assistant   Rehab Time/Intention   Session Not Performed unable to treat, medical status change  (Pt LILY this late AM. PT will check back today as time allows.)

## 2021-01-07 NOTE — PROGRESS NOTES
"    Name: Eduarda Lopez ADMIT: 2020   : 1931  PCP: Pedro Lisseth SHIELDSCALEB    MRN: [id number] LOS: 8 days   AGE/SEX: 89 y.o. female  ROOM: 91/1     Subjective   Subjective   Resting in bed. States her breathing is no better. She wants to eat regular food. Cannot get anything up. Feels no better than admission. \"I don't think I am going to make it out of here.\" No nausea or vomiting. Esophagram today with thickening and dysmotility.     Objective   Objective   Vital Signs  Temp:  [98 °F (36.7 °C)-99.2 °F (37.3 °C)] 98 °F (36.7 °C)  Heart Rate:  [67-79] 72  Resp:  [14-20] 20  BP: (123-149)/(62-81) 137/62  SpO2:  [90 %-98 %] 98 %  on  Flow (L/min):  [2.5-3] 3;   Device (Oxygen Therapy): humidified;nasal cannula  Body mass index is 27.33 kg/m².     Physical Exam  Constitutional:       General: She is not in acute distress.     Comments: Elderly, frail   HENT:      Head: Normocephalic and atraumatic.   Neck:      Musculoskeletal: Normal range of motion and neck supple.   Cardiovascular:      Rate and Rhythm: Normal rate.      Heart sounds: Normal heart sounds.   Pulmonary:      Effort: No respiratory distress.      Comments: Scattered coarse rhonchi throughout all lung fields  Abdominal:      General: Bowel sounds are normal.      Palpations: Abdomen is soft.   Musculoskeletal:      Right lower leg: No edema.      Left lower leg: No edema.   Skin:     General: Skin is warm and dry.   Neurological:      Mental Status: She is alert. Mental status is at baseline.       Cranial Nerves: No cranial nerve deficit.   Psychiatric:         Behavior: Irritable. Agitated.      Thought Content: Thought content normal.     Results Review:       I reviewed the patient's new clinical results.  Results from last 7 days   Lab Units 21  0628 21  0702 21  0431 21  0630   WBC 10*3/mm3 14.81* 17.42* 15.79* 15.89*   HEMOGLOBIN g/dL 12.0 11.7* 11.9* 12.3   PLATELETS 10*3/mm3 348 359 334 364     Results " from last 7 days   Lab Units 01/07/21  1309 01/07/21 0628 01/06/21 0702 01/05/21  1522   SODIUM mmol/L 117* 120* 123* 122*   POTASSIUM mmol/L 4.0 4.2 4.7 4.2   CHLORIDE mmol/L 80* 82* 86* 86*   CO2 mmol/L 24.8 29.0 28.8 23.3   BUN mg/dL 21 20 24* 23   CREATININE mg/dL 0.83 0.68 0.74 0.82   GLUCOSE mg/dL 192* 89 92 156*   Estimated Creatinine Clearance: 51.4 mL/min (by C-G formula based on SCr of 0.83 mg/dL).  Results from last 7 days   Lab Units 01/06/21 0702 01/05/21 0431 01/04/21 1338 01/04/21 0630 01/02/21  0846 01/01/21  0515   ALBUMIN g/dL 3.50 3.60 3.6 4.00 4.00 4.10   BILIRUBIN mg/dL 0.6  --   --  1.0 0.6 0.7   ALK PHOS U/L 87  --   --  66 70 66   AST (SGOT) U/L 21  --   --  28 31 23   ALT (SGPT) U/L 20  --   --  24 17 9     Results from last 7 days   Lab Units 01/07/21  1309 01/07/21 0628 01/06/21 0702 01/05/21  1522 01/05/21 0431 01/04/21 1338 01/04/21 0630 01/01/21  0515   CALCIUM mg/dL 8.8 8.6 8.7 8.8 8.7  --  8.9   < > 9.4   ALBUMIN g/dL  --   --  3.50  --  3.60 3.6 4.00   < > 4.10   MAGNESIUM mg/dL  --  1.8  --   --   --   --   --   --  1.8   PHOSPHORUS mg/dL  --  3.1  --   --  3.0  --   --   --   --     < > = values in this interval not displayed.     Results from last 7 days   Lab Units 01/07/21 0628 01/01/21  0515   PROCALCITONIN ng/mL 0.08 0.06     No results found for: HGBA1C, POCGLU    apixaban, 5 mg, Oral, Q12H  arformoterol, 15 mcg, Nebulization, BID - RT  budesonide, 1 mg, Nebulization, Daily - RT  citalopram, 20 mg, Oral, Daily  dilTIAZem CD, 120 mg, Oral, Q24H  guaiFENesin, 1,200 mg, Oral, Q12H  hydrALAZINE, 10 mg, Oral, Q8H  ipratropium-albuterol, 3 mL, Nebulization, 4x Daily - RT  levothyroxine, 50 mcg, Oral, Q AM  melatonin, 5 mg, Oral, Nightly  miconazole, , Topical, Q12H  montelukast, 10 mg, Oral, Nightly  multivitamin, 1 tablet, Oral, Daily  oxybutynin XL, 10 mg, Oral, Daily  pantoprazole, 40 mg, Oral, Q AM  predniSONE, 40 mg, Oral, Daily With Breakfast  sucralfate, 1 g,  Oral, 4x Daily AC & at Bedtime  theophylline, 200 mg, Oral, Q24H  tolvaptan, 15 mg, Oral, Once       Diet Soft Texture; Chopped       Assessment/Plan     Active Hospital Problems    Diagnosis  POA   • **COPD exacerbation (CMS/[facility]) [J44.1]  Yes   • HTN (hypertension) [I10]  Unknown   • Acute vaginitis [N76.0]  No   • Primary osteoarthritis of both knees [M17.0]  Yes   • Hypokalemia [E87.6]  Yes   • Chronic anticoagulation [Z79.01]  Not Applicable   • Hypoxia [R09.02]  Yes   • Chronic respiratory failure with hypoxia (CMS/[facility]) [J96.11]  Yes   • Elevated d-dimer [R79.89]  Yes   • Pressure injury of coccygeal region, stage 2 (CMS/[facility]) [L89.152]  Yes   • PAF (paroxysmal atrial fibrillation) (CMS/[facility]) [I48.0]  Yes   • Anemia [D64.9]  Yes   • Coronary artery disease [I25.10]  Yes   • Chronic diastolic CHF (congestive heart failure) (CMS/[facility]) [I50.32]  Yes   • Acquired hypothyroidism [E03.9]  Yes   • Hyponatremia [E87.1]  Yes      Resolved Hospital Problems   No resolved problems to display.     89 y.o. female admitted with COPD exacerbation (CMS/[facility]).     COPD exacerbation (CMS/[facility])/chronic respiratory failure with hypoxia.  Pulmonology following--currently on 4L NC. Wears 2L at home.  Afebrile. Pulmonary hygiene with flutter valve and CPT QID. Mucus clearance still a problem. On scheduled nebulizers with Pulmicort/Brovana/Duonebs. Oral steroids continued. Theophylline added. Continue to monitor. Leukocytosis could be steroid related.     Hyponatremia.  Nephrology following. 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 On fluid restriction. Continue to monitor. Sodium worse and Samsca added.     HTN. BP stable. Continue to monitor.     Chronic diastolic CHF (congestive heart failure) (CMS/HCC). Chest x-ray 12/30/2020 no focal pulmonary consolidation, pulmonary vasculature unremarkable.  proBNP >2600 on 1/4/2020 increased from >1700 on 12/30/2020--plan to defer diuretics to renal.     Coronary artery disease.  No evidence of  ACS.     PAF (paroxysmal atrial fibrillation) (CMS/[facility]) /Chronic anticoagulation.  Rate controlled, diltiazem, apixaban.     Elevated d-dimer.  CTA chest report no evidence of pulmonary thromboembolism, multiple small areas of mild, groundglass opacity observable lungs bilaterally, nonspecific.  VRP negative to include COVID-19.     Pressure injury of coccygeal region, stage 2 (CMS/[facility]).  Wound care recommend follow hospital protocol for stage II pressure injury.      Acute vaginitis. Topicals in place.     Primary osteoarthritis of both knees.  Complicating physical therapy.     Dysphagia. ST evaluated. Esophagram with thickening and dysmotility. Upgraded to soft chopped per ST.      · apixaban adequate for DVT prophylaxis.  · Discussed with patient, nursing staff, granddaughter and Dr. Mast.    Palliative care consulted and patient and her granddaughter are now agreeable to comfort care only. Called granddaughter (Breanna) to confirm. Goal is strictly comfort at this time. Will transfer to [facility]. Comfort meds ordered.     CALEB Jarvis  [facility]  01/07/21  15:12 EST

## 2021-01-07 NOTE — THERAPY RE-EVALUATION
Acute Care - Speech Language Pathology   Swallow Re-Evaluation [facility]     Patient Name: Eduarda Lopez  : 1931  MRN: [id number]  Today's Date: 2021               Admit Date: 2020    Visit Dx:     ICD-10-CM ICD-9-CM   1. COPD exacerbation (CMS/[facility])  J44.1 491.21   2. Acute on chronic respiratory failure with hypoxia (CMS/[facility])  J96.21 518.84     799.02     Patient Active Problem List   Diagnosis   • S/P hip hemiarthroplasty   • Dislocation of hip prosthesis (CMS/[facility])   • Sepsis without acute organ dysfunction (CMS/[facility])   • Acute UTI (urinary tract infection)   • Hyponatremia   • Acquired hypothyroidism   • Chronic diastolic CHF (congestive heart failure) (CMS/[facility])   • Enteritis due to Norovirus   • Coronary artery disease   • Spleen hematoma   • Anemia   • RBBB   • PAF (paroxysmal atrial fibrillation) (CMS/[facility])   • Chest pain, atypical   • Chronic obstructive pulmonary disease with acute exacerbation (CMS/[facility])   • Nausea & vomiting   • COPD exacerbation (CMS/[facility])   • Hypokalemia   • Chronic anticoagulation   • Hypoxia   • Chronic respiratory failure with hypoxia (CMS/[facility])   • Elevated d-dimer   • Pressure injury of coccygeal region, stage 2 (CMS/[facility])   • Acute vaginitis   • Primary osteoarthritis of both knees   • HTN (hypertension)     Past Medical History:   Diagnosis Date   • Arthritis    • Asthma    • CHF (congestive heart failure) (CMS/[facility])    • Disease of thyroid gland    • Fracture of hip (CMS/[facility])    • Hyperlipidemia    • Hypertension    • Primary osteoarthritis of both knees 1/3/2021   • Thyroid disease      Past Surgical History:   Procedure Laterality Date   • CHOLECYSTECTOMY     • HIP SURGERY     • HYSTERECTOMY     • JOINT REPLACEMENT     • SHOULDER SURGERY     • THYROID SURGERY     • TONSILLECTOMY       Patient was not wearing a face mask during this therapy encounter. Therapist used appropriate personal protective equipment including mask, eye protection and gloves.  Mask used  was N95/duckbill. Appropriate PPE was worn during the entire therapy session. Hand hygiene was completed before and after therapy session. Patient is not in enhanced droplet precautions.          SWALLOW EVALUATION (last 72 hours)      SLP Adult Swallow Evaluation     Row Name 01/07/21 0930 01/06/21 1408 01/05/21 1500             Rehab Evaluation    Document Type  re-evaluation  -  therapy note (daily note)  -  evaluation  -      Subjective Information  --  complains of diet restrictions (puree food and liquid restriction)  -  --      Patient Observations  --  alert;poorly cooperative  -  --      Patient Effort  fair  -  fair  -  --      Comment  --  Pt now denying food sticking  -  --      Symptoms Noted During/After Treatment  --  none  -  --         General Information    Patient Profile Reviewed  yes  -  yes  -  --      Pertinent History Of Current Problem  COPD, PLAN FOR ESOPHAGRAM THIS AM  -  COPD  -  --      Current Method of Nutrition  pureed;thin liquids  -  pureed;thin liquids  -  --      Precautions/Limitations, Vision  WFL with corrective lenses;for purposes of eval  -  WFL with corrective lenses;for purposes of eval  -  --      Precautions/Limitations, Hearing  hearing impairment, bilaterally  -  hearing impairment, bilaterally  -  --      Prior Level of Function-Communication  WFL  -  WFL  -  --      Prior Level of Function-Swallowing  no diet consistency restrictions  -  no diet consistency restrictions  -  --      Plans/Goals Discussed with  patient  -  patient;other (see comments) pt now denying food sticking  -  --      Barriers to Rehab  medically complex  -  medically complex  -  --      Patient's Goals for Discharge  return to regular diet  -  return to regular diet  -  --         Pain Scale: Numbers Pre/Post-Treatment    Pretreatment Pain Rating  0/10 - no pain  -  --  --         Clinical Swallow Eval    Clinical Swallow Evaluation  Summary  Pt seen for re evaluation of swallow function. Pt demanding cereal with milk. RN is requesting a re assessment. Pt c/o about puree textures. Speech therapy recommended a GI referral and/or esophagram d/t complaint of food sticking at chest level. Pt is denying obstruction this date. Baseline throat clear and increased RR at baseline. Prolonged munching mastication with mixed, but no s/s of aspiration observed. SLP will upgrade to soft/chopped, continue thins. Unsure how much more ST can offer in regards to variable c/o obstruction in the chest. Will follow for diet tolerance and education as needed.   -SH  Pt without signficant wetness as noted yesterday.  Observed with yogurt, pudding, Boost without any s/s aspiration or change in vocal quality.  -SA  --         Clinical Impression    SLP Swallowing Diagnosis  mild;oral dysphagia;R/O pharyngeal dysphagia  -SH  other (see comments) suspect esophageal  -SA  --      Functional Impact  risk of aspiration/pneumonia  -  risk of aspiration/pneumonia  -SA  --      Rehab Potential/Prognosis, Swallowing  re-evaluate goals as necessary  -  re-evaluate goals as necessary  -SA  --      Swallow Criteria for Skilled Therapeutic Interventions Met  no significant expected improvement in functional status  -  no significant expected improvement in functional status  -SA  --         Recommendations    Therapy Frequency (Swallow)  PRN  -SH  PRN  -SA  --      Predicted Duration Therapy Intervention (Days)  until discharge  -  until discharge  -SA  --      SLP Diet Recommendation  soft textures;chopped;thin liquids  -  puree;thin liquids;other (see comments) MD notes possible discussionof goals of care  -SA  --      Recommended Diagnostics  --  other (see comments) if desired, can do vfss to see if any impact on OP swallow  -SA  --      Recommended Precautions and Strategies  upright posture during/after eating;small bites of food and sips of liquid;alternate between  small bites of food and sips of liquid  -  upright posture during/after eating;small bites of food and sips of liquid  -SA  --      Oral Care Recommendations  Oral Care BID/PRN  -  Oral Care BID/PRN  -  --      SLP Rec. for Method of Medication Administration  as tolerated  -  as tolerated  -  --      Monitor for Signs of Aspiration  notify SLP if any concerns  -  notify SLP if any concerns  -SA  --      Anticipated Discharge Disposition (SLP)  unknown  -  unknown  -  --         Swallow Goals (SLP)    Oral Nutrition/Hydration Goal Selection (SLP)  oral nutrition/hydration, SLP goal 1  -  --  --         Oral Nutrition/Hydration Goal 1 (SLP)    Oral Nutrition/Hydration Goal 1, SLP  Pt will alexys PO without overt s/s of asp.  -SH  --  --      Time Frame (Oral Nutrition/Hydration Goal 1, SLP)  by discharge  -  --  --        User Key  (r) = Recorded By, (t) = Taken By, (c) = Cosigned By    Initials Name Effective Dates     Hafsa Moncada, MS [facility]-SLP 03/07/18 -      Yesi Trinh, MS CCC-SLP 03/07/18 -           EDUCATION  The patient has been educated in the following areas:   Dysphagia (Swallowing Impairment).    SLP Recommendation and Plan  SLP Swallowing Diagnosis: mild, oral dysphagia, R/O pharyngeal dysphagia  SLP Diet Recommendation: soft textures, chopped, thin liquids  Recommended Precautions and Strategies: upright posture during/after eating, small bites of food and sips of liquid, alternate between small bites of food and sips of liquid  SLP Rec. for Method of Medication Administration: as tolerated     Monitor for Signs of Aspiration: notify SLP if any concerns     Swallow Criteria for Skilled Therapeutic Interventions Met: no significant expected improvement in functional status  Anticipated Discharge Disposition (SLP): unknown  Rehab Potential/Prognosis, Swallowing: re-evaluate goals as necessary  Therapy Frequency (Swallow): PRN  Predicted Duration Therapy Intervention (Days): until  discharge                         Plan of Care Reviewed With: patient  Progress: improving  Outcome Summary: Pt seen for re evaluation of swallow function. Pt demanding cereal with milk. RN is requesting a re assessment. Pt c/o about puree textures. Speech therapy recommended a GI referral or esophagram d/t complaint of food sticking. Pt is denying obstruction this date. Baseline throat clear and increased RR. Prolonged munching mastication with mixed, but no s/s of aspiration observed. SLP will upgrade to soft/chopped, continue thins. Unsure how much more ST can offer in regards to variable c/o obstruction in the chest.    SLP GOALS     Row Name 01/07/21 0930             Oral Nutrition/Hydration Goal 1 (SLP)    Oral Nutrition/Hydration Goal 1, SLP  Pt will alexys PO without overt s/s of asp.  -      Time Frame (Oral Nutrition/Hydration Goal 1, SLP)  by discharge  -        User Key  (r) = Recorded By, (t) = Taken By, (c) = Cosigned By    Initials Name Provider Type    Yesi Montesinos MS CCC-SLP Speech and Language Pathologist           SLP Outcome Measures (last 72 hours)      SLP Outcome Measures     Row Name 01/07/21 1100 01/05/21 1200          SLP Outcome Measures    Outcome Measure Used?  Adult NOMS  -SH  Adult NOMS  -SA        Adult FCM Scores    FCM Chosen  Swallowing  -SH  Swallowing  -SA     Swallowing FCM Score  5  -  4  -SA       User Key  (r) = Recorded By, (t) = Taken By, (c) = Cosigned By    Initials Name Effective Dates    Hafsa Cole MS CCC-SLP 03/07/18 -      Yesi Trinh MS CCC-SLP 03/07/18 -            Time Calculation:   Time Calculation- SLP     Row Name 01/07/21 1117             Time Calculation- SLP    SLP Start Time  0930  -      SLP Received On  01/07/21  -        User Key  (r) = Recorded By, (t) = Taken By, (c) = Cosigned By    Initials Name Provider Type    Yesi Montesinos MS CCC-SLP Speech and Language Pathologist          Therapy Charges for Today     Code  Description Service Date Service Provider Modifiers Qty    [id number]  ST TREATMENT SWALLOW 4 1/7/2021 Yesi Trinh, MS CCC-SLP GN 1               Yesi Trinh MS CCC-SLP  1/7/2021

## 2021-01-07 NOTE — PROGRESS NOTES
"   LOS: 8 days    Patient Care Team:  Lisseth Vasquez APRN as PCP - General (Family Medicine)  Inocencio Duff MD as Consulting Physician (Pulmonary Disease)    Chief Complaint:    Chief Complaint   Patient presents with   • Shortness of Breath   • Chest Pain     Follow UP hyponatremia  Subjective     Interval History:   Esophagram noted with esophageal thickening, dysmotility.  Diet upgraded to chopped. Sodium lower this am to 120 despite salt tablets and lasix.  Repeat bmp pending now .  Sitting up in chair.   Worn out after getting esophagram.    Review of Systems:   See above .    Objective     Vital Signs  Temp:  [98.2 °F (36.8 °C)-99.2 °F (37.3 °C)] 98.8 °F (37.1 °C)  Heart Rate:  [59-79] 76  Resp:  [14-18] 16  BP: (123-149)/(68-81) 133/68    Flowsheet Rows      First Filed Value   Admission Height  172.7 cm (67.99\") Documented at 12/31/2020 1446   Admission Weight  81.5 kg (179 lb 10.8 oz) Documented at 12/30/2020 2300          I/O this shift:  In: 360 [P.O.:360]  Out: 200 [Urine:200]  I/O last 3 completed shifts:  In: 1788 [P.O.:1788]  Out: 1500 [Urine:1500]    Intake/Output Summary (Last 24 hours) at 1/7/2021 1326  Last data filed at 1/7/2021 1010  Gross per 24 hour   Intake 950 ml   Output 1200 ml   Net -250 ml       Physical Exam:  Elderly, very [place].  Edentulous.  No scleral icterus.  Mask applied. Mild tachypnea, congested.    Neck no jvd  Heart RRR no s3 or rub  Lungs decreased bs bases, no wheezing. Upper airway rhonchi, very congested.   Abd + bs, soft, nontender  Ext trace lower ext edema   Skin scattered ecchymoses, no rash  Neuro: [place].  Speech fluent . Moves all 4 ext.       Results Review:    Results from last 7 days   Lab Units 01/07/21  0628 01/06/21  0702 01/05/21  1522  01/04/21  0630 01/02/21  0846   SODIUM mmol/L 120* 123* 122*   < > 118* 128*   POTASSIUM mmol/L 4.2 4.7 4.2   < > 3.6 3.8   CHLORIDE mmol/L 82* 86* 86*   < > 81* 88*   CO2 mmol/L 29.0 28.8 23.3   < > 25.9 26.4   BUN " mg/dL 20 24* 23   < > 16 20   CREATININE mg/dL 0.68 0.74 0.82   < > 0.64 0.79   CALCIUM mg/dL 8.6 8.7 8.8   < > 8.9 9.5   BILIRUBIN mg/dL  --  0.6  --   --  1.0 0.6   ALK PHOS U/L  --  87  --   --  66 70   ALT (SGPT) U/L  --  20  --   --  24 17   AST (SGOT) U/L  --  21  --   --  28 31   GLUCOSE mg/dL 89 92 156*   < > 96 144*    < > = values in this interval not displayed.       Estimated Creatinine Clearance: 53.4 mL/min (by C-G formula based on SCr of 0.68 mg/dL).    Results from last 7 days   Lab Units 01/07/21  0628 01/05/21  0431 01/01/21  0515   MAGNESIUM mg/dL 1.8  --  1.8   PHOSPHORUS mg/dL 3.1 3.0  --        Results from last 7 days   Lab Units 01/05/21  0431   URIC ACID mg/dL 3.4       Results from last 7 days   Lab Units 01/07/21  0628 01/06/21  0702 01/05/21  0431 01/04/21  0630 01/02/21  0846   WBC 10*3/mm3 14.81* 17.42* 15.79* 15.89* 12.12*   HEMOGLOBIN g/dL 12.0 11.7* 11.9* 12.3 12.6   PLATELETS 10*3/mm3 348 359 334 364 386               Imaging Results (Last 24 Hours)     Procedure Component Value Units Date/Time    FL Esophagram Complete Single Contrast [[id number]] Collected: 01/07/21 1222     Updated: 01/07/21 1228    Narrative:      Limited fluoroscopic esophagram     HISTORY: Dysphagia       Impression:      TECHNIQUE, FINDINGS AND IMPRESSION:   image was obtained. Subtle asymmetric pulmonary opacification is  present within the left upper and lower lung correlating with  groundglass opacification seen in these locations on recent CTA.  Findings are nonspecific but suggestive of atypical pneumonia in the  appropriate clinical context and correlation with patient history is  recommended. Covid 19 is a differential consideration. No pneumothorax  or pleural effusion is seen.  Heart is normal in size. Right humeral  arthroplasty is present.     The patient's difficulty with following directions and mobility  significantly limited this evaluation and only single contrast images  and oblique and  lateral planes were able to be performed.     Patient demonstrated moderate to severe esophageal dysmotility with  significant delay in contrast clearance from the mid to distal esophagus  with associated tertiary contractions and retrograde flow of contrast.  No findings of significant persistent esophageal stenosis to suggest  stricture formation. There is apparent mucosal thickening throughout the  esophagus most suggestive of underlying esophagitis and correlation with  endoscopy should be considered depending on patient's operative status.     The degree of esophageal dysmotility place this patient at increased  risk for aspiration and the technologist was instructed to keep the  patient in a semierect upright position and to instruct the transport  and nursing staff to do the same.     FLUOROSCOPY TIME: 1 minute 44 seconds, 42 images.     This report was finalized on 1/7/2021 12:25 PM by Dr. Rambo Dow M.D.           apixaban, 5 mg, Oral, Q12H  arformoterol, 15 mcg, Nebulization, BID - RT  budesonide, 1 mg, Nebulization, Daily - RT  citalopram, 20 mg, Oral, Daily  dilTIAZem CD, 120 mg, Oral, Q24H  furosemide, 20 mg, Oral, BID  guaiFENesin, 1,200 mg, Oral, Q12H  hydrALAZINE, 10 mg, Oral, Q8H  ipratropium-albuterol, 3 mL, Nebulization, 4x Daily - RT  levothyroxine, 50 mcg, Oral, Q AM  melatonin, 5 mg, Oral, Nightly  miconazole, , Topical, Q12H  montelukast, 10 mg, Oral, Nightly  multivitamin, 1 tablet, Oral, Daily  oxybutynin XL, 10 mg, Oral, Daily  pantoprazole, 40 mg, Oral, Q AM  predniSONE, 40 mg, Oral, Daily With Breakfast  sodium chloride, 1 g, Oral, TID With Meals  sucralfate, 1 g, Oral, 4x Daily AC & at Bedtime  theophylline, 200 mg, Oral, Q24H           Medication Review:   Current Facility-Administered Medications   Medication Dose Route Frequency Provider Last Rate Last Admin   • acetaminophen (TYLENOL) tablet 650 mg  650 mg Oral Q4H PRN Josie Beauchamp APRN   650 mg at 01/07/21 0548   •  albuterol (ACCUNEB) nebulizer solution 0.63 mg  0.63 mg Nebulization Q4H PRN Anthony Soria MD   0.63 mg at 01/04/21 0435   • aluminum-magnesium hydroxide-simethicone (MAALOX MAX) 400-400-40 MG/5ML suspension 15 mL  15 mL Oral Q6H PRN Josie Beauchamp APRN       • apixaban (ELIQUIS) tablet 5 mg  5 mg Oral Q12H Catracho Del Real MD   5 mg at 01/07/21 0842   • arformoterol (BROVANA) nebulizer solution 15 mcg  15 mcg Nebulization BID - RT Josie Beauchamp APRN   15 mcg at 01/07/21 0953   • bisacodyl (DULCOLAX) EC tablet 5 mg  5 mg Oral Daily PRN Josie Beauchamp APRN       • bisacodyl (DULCOLAX) suppository 10 mg  10 mg Rectal Daily PRN Josie Beauchamp APRN       • budesonide (PULMICORT) nebulizer solution 1 mg  1 mg Nebulization Daily - RT Shine Gonsalez MD   1 mg at 01/07/21 0819   • citalopram (CeleXA) tablet 20 mg  20 mg Oral Daily Josie Beauchamp APRN   20 mg at 01/07/21 0842   • dilTIAZem CD (CARDIZEM CD) 24 hr capsule 120 mg  120 mg Oral Q24H Josie Beauchamp APRN   120 mg at 01/07/21 0842   • furosemide (LASIX) tablet 20 mg  20 mg Oral BID Josie Cabello MD   20 mg at 01/07/21 0842   • guaiFENesin (MUCINEX) 12 hr tablet 1,200 mg  1,200 mg Oral Q12H Josie Beauchamp APRN   1,200 mg at 01/07/21 0842   • hydrALAZINE (APRESOLINE) tablet 10 mg  10 mg Oral Q8H Jaron Orozco APRN   10 mg at 01/07/21 1237   • ipratropium-albuterol (DUO-NEB) nebulizer solution 3 mL  3 mL Nebulization 4x Daily - RT Josie Beauchamp APRN   3 mL at 01/07/21 0801   • levothyroxine (SYNTHROID, LEVOTHROID) tablet 50 mcg  50 mcg Oral Q AM Josie Beauchamp APRN   50 mcg at 01/07/21 0547   • melatonin tablet 5 mg  5 mg Oral Nightly Sharad Green MD   5 mg at 01/06/21 2337   • miconazole (MICOTIN) 2 % cream   Topical Q12H Catracho Del Real MD   1 application at 01/07/21 0849   • montelukast (SINGULAIR) tablet 10 mg  10 mg Oral Nightly Josie Beauchamp APRN   10 mg at 01/06/21 2337   •  multivitamin (THERAGRAN) tablet 1 tablet  1 tablet Oral Daily Josie Beauchamp APRN   1 tablet at 01/07/21 0842   • muscle rub (BenGay) 10-15 % cream   Topical 4x Daily PRN Catracho Del Real MD   Given at 01/07/21 0548   • ondansetron (ZOFRAN) tablet 4 mg  4 mg Oral Q6H PRN Josie Beauchamp APRN        Or   • ondansetron (ZOFRAN) injection 4 mg  4 mg Intravenous Q6H PRN Josie Beauchamp APRN       • oxybutynin XL (DITROPAN-XL) 24 hr tablet 10 mg  10 mg Oral Daily Josie Beauchamp APRN   10 mg at 01/07/21 0842   • pantoprazole (PROTONIX) EC tablet 40 mg  40 mg Oral Q AM Josie Beauchamp APRN   40 mg at 01/07/21 0548   • predniSONE (DELTASONE) tablet 40 mg  40 mg Oral Daily With Breakfast Sharad Green MD   40 mg at 01/07/21 0842   • sodium chloride 0.9 % flush 10 mL  10 mL Intravenous PRN Nic Lewis MD       • sodium chloride 0.9 % flush 10 mL  10 mL Intravenous PRN Josie Beauchamp APRN   10 mL at 01/06/21 2338   • sodium chloride tablet 1 g  1 g Oral TID With Meals Josie Cabello MD   1 g at 01/07/21 1237   • sucralfate (CARAFATE) tablet 1 g  1 g Oral 4x Daily AC & at Bedtime Josie Beauchamp APRN   1 g at 01/07/21 1237   • theophylline (UNIPHYL) 24 hr tablet 200 mg  200 mg Oral Q24H Octavio Lino MD   200 mg at 01/07/21 0842       Assessment/Plan   1. Hyponatremia, acute on chronic. Sodium unchanged. 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  TSH normal.     Sodium worse today despite salt and lasix.  Repeat pending. If sodium still 120, will give one dose tolvaptan.  2. COPD exacerbation  3. Hypothryoid on replacement, TSH compensated.   4. Chronic diastolic heart failure compensated.  5. Anemia  6. Dysphagia.  On modified diet.      Unfortunately, patient is not going to be compliant with diet or fluid restriction outside the hospital.  Agree with palliative care consult as her goal seems to be to eat and drink what she wants (  not unreasonable at  age 89, but not congruent with fluid restriction.)      Josie Cabello MD  01/07/21  13:26 EST

## 2021-01-07 NOTE — PLAN OF CARE
Goal Outcome Evaluation:  Plan of Care Reviewed With: patient  Progress: no change     Pt continues to be disrespectful, frequently calling out staff, and not wanting to comply with fluid restrictions & diet. Purewick in place & voiding well. So far, 480 mL fluid intake since midnight. Lung sounds abnormal with congested cough. Pt on 3 L humidified O2 NC. She has been checked on every hour to be repositioned, get her comfortable in bed, and provide comfort. Pt A+O x 4, but forgetful. Personality still labile. She has demanded many things (from joint relief cream application & reapplying socks to then just ever wanting Jello to eat). She changes her mind frequently. She has not slept much tonight. All same behavior the past 3 nights that I have cared for this pt. Pending further plan of care. I will continue to monitor and progress towards goals as tolerated.     Myra Thapa RN

## 2021-01-07 NOTE — PLAN OF CARE
Goal Outcome Evaluation:  Plan of Care Reviewed With: patient  Progress: improving  Outcome Summary: Pt seen for re evaluation of swallow function. Pt demanding cereal with milk. RN is requesting a re assessment. Pt c/o about puree textures. Speech therapy recommended a GI referral and/or esophagram d/t complaint of food sticking at chest level. Pt is denying obstruction this date. Baseline throat clear and increased RR at baseline. Prolonged munching mastication with mixed, but no s/s of aspiration observed. SLP will upgrade to soft/chopped, continue thins. Unsure how much more ST can offer in regards to variable c/o obstruction in the chest. Will follow for diet tolerance and education as needed.

## 2021-01-07 NOTE — PROGRESS NOTES
Continued Stay Note  [facility]     Patient Name: Eduarda Lopez  MRN: [id number]  Today's Date: 1/7/2021    Admit Date: 12/30/2020    Discharge Plan     Row Name 01/07/21 0846       Plan    Plan  SNF, Referrals pending    Patient/Family in Agreement with Plan  yes    Plan Comments  Message from APRN that family now interested in SNF at [place]. Spoke with Granddaughter Breanna and obtained referrals for [facility], and [facility]. Referrals placed in Epic and partial packet in CCP office. Mynra Morna RN        Discharge Codes    No documentation.             Myrna Moran RN

## 2021-01-08 NOTE — SIGNIFICANT NOTE
01/08/21 1034   OTHER   Discipline speech language pathologist   Rehab Time/Intention   Session Not Performed other (see comments)  (Pt is now comfort measures, consider discontinuing modified diet if patient is asking for specific types of foods. SLP to sign off.)

## 2021-01-08 NOTE — SIGNIFICANT NOTE
01/08/21 1046   OTHER   Discipline physical therapy assistant   Rehab Time/Intention   Session Not Performed other (see comments)  (Pt unable to stay awake for more than a few sec. Will check back as time allows.)

## 2021-01-08 NOTE — PROGRESS NOTES
[facility] Medicine Services  PROGRESS NOTE    Patient Name: Eduarda Lopez  : 1931  MRN: [id number]    Date of Admission: 2020  Primary Care Physician: Lisseth Vasquez APRN    Subjective   Subjective     CC:  Follow-up COPD    HPI:  Symptoms controlled.  Denies complaints.    Review of Systems  No current fevers or chills  No current nausea, vomiting, or diarrhea  No current chest pain or palpitations      Objective   Objective     Vital Signs:   Temp:  [98 °F (36.7 °C)-98.3 °F (36.8 °C)] 98.3 °F (36.8 °C)  Heart Rate:  [72-91] 91  Resp:  [16-20] 20  BP: (137-142)/(62-81) 142/81        Physical Exam:  Constitutional:Awake, alert  HENT: NCAT, mucous membranes moist, neck supple  Respiratory: Scattered rhonchi bilaterally, respiratory effort normal, nonlabored breathing   Cardiovascular: RRR, normal radial pulses  Gastrointestinal: Positive bowel sounds, soft, nontender, nondistended  Musculoskeletal: Elderly frail, BMI is 27, no pitting edema   psychiatric: Calm but a little anxious affect, cooperative, conversational  Neurologic: No slurred speech or facial droop, follows commands  Skin: No rashes or jaundice, warm      Results Reviewed:  Results from last 7 days   Lab Units 21  0628 21  0702 21  0431   WBC 10*3/mm3 14.81* 17.42* 15.79*   HEMOGLOBIN g/dL 12.0 11.7* 11.9*   HEMATOCRIT % 34.7 35.0 34.6   PLATELETS 10*3/mm3 348 359 334   PROCALCITONIN ng/mL 0.08  --   --      Results from last 7 days   Lab Units 21  1309 21  0628 21  0702  21  0630 21  0846   SODIUM mmol/L 117* 120* 123*   < > 118* 128*   POTASSIUM mmol/L 4.0 4.2 4.7   < > 3.6 3.8   CHLORIDE mmol/L 80* 82* 86*   < > 81* 88*   CO2 mmol/L 24.8 29.0 28.8   < > 25.9 26.4   BUN mg/dL 21 20 24*   < > 16 20   CREATININE mg/dL 0.83 0.68 0.74   < > 0.64 0.79   GLUCOSE mg/dL 192* 89 92   < > 96 144*   CALCIUM mg/dL 8.8 8.6 8.7   < > 8.9 9.5   ALT (SGPT) U/L  --   --  20  --  24 17   AST (SGOT)  U/L  --   --  21  --  28 31   PROBNP pg/mL  --   --   --   --  2,662.0*  --     < > = values in this interval not displayed.     Estimated Creatinine Clearance: 51.4 mL/min (by C-G formula based on SCr of 0.83 mg/dL).    Microbiology Results Abnormal     Procedure Component Value - Date/Time    Respiratory Panel PCR w/COVID-19(SARS-CoV-2) RU/SARAH/TIERNEY/PAD/COR/MAD/TERRENCE In-House, NP Swab in UTM/VTM, 3-4 HR TAT - Swab, Nasopharynx [[id number]]  (Normal) Collected: 01/01/21 0834    Lab Status: Final result Specimen: Swab from Nasopharynx Updated: 01/01/21 0946     ADENOVIRUS, PCR Not Detected     Coronavirus 229E Not Detected     Coronavirus HKU1 Not Detected     Coronavirus NL63 Not Detected     Coronavirus OC43 Not Detected     COVID19 Not Detected     Human Metapneumovirus Not Detected     Human Rhinovirus/Enterovirus Not Detected     Influenza A PCR Not Detected     Influenza B PCR Not Detected     Parainfluenza Virus 1 Not Detected     Parainfluenza Virus 2 Not Detected     Parainfluenza Virus 3 Not Detected     Parainfluenza Virus 4 Not Detected     RSV, PCR Not Detected     Bordetella pertussis pcr Not Detected     Bordetella parapertussis PCR Not Detected     Chlamydophila pneumoniae PCR Not Detected     Mycoplasma pneumo by PCR Not Detected    Narrative:      Fact sheet for providers: https://docs.[employer]/wp-content/uploads/VJJ2570-7651-ZW0.1-EUA-Provider-Fact-Sheet-3.pdf    Fact sheet for patients: https://docs.[employer]/wp-content/uploads/OKV7710-6104-XU0.1-EUA-Patient-Fact-Sheet-1.pdf    Test performed by PCR.    Legionella Antigen, Urine - Urine, Urine, Clean Catch [[id number]]  (Normal) Collected: 12/31/20 1752    Lab Status: Final result Specimen: Urine, Clean Catch Updated: 12/1934     LEGIONELLA ANTIGEN, URINE Negative    COVID PRE-OP / PRE-PROCEDURE SCREENING ORDER (NO ISOLATION) - Swab, Nasopharynx [[id number]]  (Normal) Collected: 12/30/20 1832    Lab Status: Final result Specimen: Swab  from Nasopharynx Updated: 12/30/20 2223    Narrative:      The following orders were created for panel order COVID PRE-OP / PRE-PROCEDURE SCREENING ORDER (NO ISOLATION) - Swab, Nasopharynx.  Procedure                               Abnormality         Status                     ---------                               -----------         ------                     COVID-19,APTIMA PANTHER,...[[id number]]  Normal              Final result                 Please view results for these tests on the individual orders.    COVID-19,APTIMA PANTHER,RU IN-HOUSE, NP/OP SWAB IN UTM/VTM/SALINE TRANSPORT MEDIA,24 HR TAT - Swab, Nasopharynx [[id number]]  (Normal) Collected: 12/30/20 1833    Lab Status: Final result Specimen: Swab from Nasopharynx Updated: 12/30/20 2223     COVID19 Not Detected    Narrative:      Fact sheet for providers: https://www.fda.gov/media/438365/download     Fact sheet for patients: https://www.fda.gov/media/881680/download    Test performed by PCR.          Imaging Results (Last 24 Hours)     ** No results found for the last 24 hours. **          Results for orders placed during the hospital encounter of 01/24/20   Adult Transthoracic Echo Complete W/ Cont if Necessary Per Protocol    Narrative · Left ventricular systolic function is hyperdynamic (EF > 70%).  · Left ventricular diastolic dysfunction is noted (grade I) consistent   with impaired relaxation.  · Normal right ventricular cavity size and systolic function noted.  · Left atrial cavity size is moderately dilated.  · There is mild calcification of the aortic valve leaflets.  · Mild tricuspid valve regurgitation is present.  · Calculated right ventricular systolic pressure from tricuspid   regurgitation is 31 mmHg.  · There is no evidence of pericardial effusion.          I have reviewed the medications:  Scheduled Meds:apixaban, 5 mg, Oral, Q12H  arformoterol, 15 mcg, Nebulization, BID - RT  budesonide, 1 mg, Nebulization, Daily - RT  citalopram, 20  mg, Oral, Daily  dilTIAZem CD, 120 mg, Oral, Q24H  guaiFENesin, 1,200 mg, Oral, Q12H  hydrALAZINE, 10 mg, Oral, Q8H  ipratropium-albuterol, 3 mL, Nebulization, 4x Daily - RT  levothyroxine, 50 mcg, Oral, Q AM  melatonin, 5 mg, Oral, Nightly  miconazole, , Topical, Q12H  oxybutynin XL, 10 mg, Oral, Daily  pantoprazole, 40 mg, Oral, Q AM  predniSONE, 30 mg, Oral, Daily With Breakfast  sucralfate, 1 g, Oral, 4x Daily AC & at Bedtime  theophylline, 200 mg, Oral, Q24H      Continuous Infusions:   PRN Meds:.•  acetaminophen **OR** acetaminophen **OR** acetaminophen  •  acetaminophen  •  albuterol  •  aluminum-magnesium hydroxide-simethicone  •  bisacodyl  •  bisacodyl  •  diphenoxylate-atropine  •  Glycerin-Hypromellose-  •  LORazepam **OR** LORazepam **OR** LORazepam  •  Morphine **OR** morphine  •  muscle rub  •  ondansetron **OR** ondansetron  •  Insert peripheral IV **AND** sodium chloride  •  sodium chloride    Assessment/Plan   Assessment & Plan     Active Hospital Problems    Diagnosis  POA   • **COPD exacerbation (CMS/HCC) [J44.1]  Yes   • HTN (hypertension) [I10]  Unknown   • Acute vaginitis [N76.0]  No   • Primary osteoarthritis of both knees [M17.0]  Yes   • Hypokalemia [E87.6]  Yes   • Chronic anticoagulation [Z79.01]  Not Applicable   • Hypoxia [R09.02]  Yes   • Chronic respiratory failure with hypoxia (CMS/HCC) [J96.11]  Yes   • Elevated d-dimer [R79.89]  Yes   • Pressure injury of coccygeal region, stage 2 (CMS/HCC) [L89.152]  Yes   • PAF (paroxysmal atrial fibrillation) (CMS/HCC) [I48.0]  Yes   • Anemia [D64.9]  Yes   • Coronary artery disease [I25.10]  Yes   • Chronic diastolic CHF (congestive heart failure) (CMS/HCC) [I50.32]  Yes   • Acquired hypothyroidism [E03.9]  Yes   • Hyponatremia [E87.1]  Yes      Resolved Hospital Problems   No resolved problems to display.        Brief Hospital Course to date:  Eduarda Lopez is a 89 y.o. female with history of asthma and COPD found to have acute COPD  exacerbation.  She was seen by the palliative care team and evaluation and wants to pursue palliative treatment options.    Discussion/plan for today: Consult hospice to work on arranging home hospice needs.  Currently on 3 L nasal cannula.  Continue morphine as needed.    COPD exacerbation (CMS/[facility])/chronic respiratory failure with hypoxia.  Pulmonology following and assisting with management.  Palliative care also helped.  Currently comfort focused treatment.  Continue current respiratory treatments and steroid taper.  Morphine as needed for air hunger.     Hyponatremia.  [place] to be SIADH.  Comfort measures now no further lab studies.  Patient does not want a restricted diet.     Essential hypertension: Transition more to comfort focused measures.     Chronic diastolic CHF (congestive heart failure) (CMS/[facility]).   Can de-escalate cardiac medications as desired.     Coronary artery disease.  No evidence of ACS.     PAF (paroxysmal atrial fibrillation) (CMS/[facility]) /Chronic anticoagulation.  Rate controlled, diltiazem, apixaban.     Elevated d-dimer.  CTA chest report no evidence of pulmonary thromboembolism, multiple small areas of mild, groundglass opacity observable lungs bilaterally, nonspecific.  VRP negative to include COVID-19.     Pressure injury of coccygeal region, stage 2 (CMS/[facility]).  Wound care recommend follow hospital protocol for stage II pressure injury.  Comfort treatment.     Acute vaginitis. Topicals in place.     Primary osteoarthritis of both knees.  Complicating physical therapy.     Dysphagia.  Patient first comfort diet.    DVT Prophylaxis: On anticoagulation for now.      Disposition: I expect the patient to be discharged likely home hospice.    CODE STATUS:   Code Status and Medical Interventions:   Ordered at: 01/07/21 8126     Level Of Support Discussed With:    Health Care Surrogate     Code Status:    No CPR     Medical Interventions (Level of Support Prior to Arrest):    Comfort Measures      Comments:    Breanna Mast MD  01/08/21

## 2021-01-08 NOTE — PLAN OF CARE
Goal Outcome Evaluation:  Plan of Care Reviewed With: durable power of   Progress: no change  Outcome Summary: Patient sleeping deeply most of this shift after report of restless, anxious night.  Patient aroused briefly, answered orientation questions appropriately then fell back into deep sleep.  MARVEL Carlos, updated via phone.  Will continue to monitor.

## 2021-01-08 NOTE — PROGRESS NOTES
"                                              LOS: 9 days   Patient Care Team:  Lisseth Vasquez APRN as PCP - General (Family Medicine)  Inocencio Duff MD as Consulting Physician (Pulmonary Disease)    Chief Complaint:  F/up COPD exacerbation, respiratory failure and medical problems listed below    Subjective   Interval History  Looks comfortable.  On oxygen.  No reports of significant cough      Ventilator/Non-Invasive Ventilation Settings (From admission, onward)    None                Physical Exam:     Vital Signs  Temp:  [98.3 °F (36.8 °C)] 98.3 °F (36.8 °C)  Heart Rate:  [74-91] 90  Resp:  [18-20] 20  BP: (142)/(81) 142/81    Intake/Output Summary (Last 24 hours) at 1/8/2021 1519  Last data filed at 1/8/2021 1312  Gross per 24 hour   Intake 465 ml   Output 625 ml   Net -160 ml     Flowsheet Rows      First Filed Value   Admission Height  172.7 cm (67.99\") Documented at 12/31/2020 1446   Admission Weight  81.5 kg (179 lb 10.8 oz) Documented at 12/30/2020 2300          General Appearance:   Sleeping, in no acute distress               Lungs:    Nonlabored breathing.  Symmetrical chest expansion           Abdomen:    No paradoxical abdominal breathing             Results Review:        Results from last 7 days   Lab Units 01/07/21  1309 01/07/21  0628 01/06/21  0702   SODIUM mmol/L 117* 120* 123*   POTASSIUM mmol/L 4.0 4.2 4.7   CHLORIDE mmol/L 80* 82* 86*   CO2 mmol/L 24.8 29.0 28.8   BUN mg/dL 21 20 24*   CREATININE mg/dL 0.83 0.68 0.74   GLUCOSE mg/dL 192* 89 92   CALCIUM mg/dL 8.8 8.6 8.7         Results from last 7 days   Lab Units 01/07/21  0628 01/06/21  0702 01/05/21  0431   WBC 10*3/mm3 14.81* 17.42* 15.79*   HEMOGLOBIN g/dL 12.0 11.7* 11.9*   HEMATOCRIT % 34.7 35.0 34.6   PLATELETS 10*3/mm3 348 359 334         Results from last 7 days   Lab Units 01/04/21  0630   PROBNP pg/mL 2,662.0*       I reviewed the patient's new clinical results.        Medication Review:   apixaban, 5 mg, Oral, " Q12H  arformoterol, 15 mcg, Nebulization, BID - RT  budesonide, 1 mg, Nebulization, Daily - RT  citalopram, 20 mg, Oral, Daily  dilTIAZem CD, 120 mg, Oral, Q24H  guaiFENesin, 1,200 mg, Oral, Q12H  hydrALAZINE, 10 mg, Oral, Q8H  ipratropium-albuterol, 3 mL, Nebulization, 4x Daily - RT  levothyroxine, 50 mcg, Oral, Q AM  melatonin, 5 mg, Oral, Nightly  miconazole, , Topical, Q12H  oxybutynin XL, 10 mg, Oral, Daily  pantoprazole, 40 mg, Oral, Q AM  predniSONE, 30 mg, Oral, Daily With Breakfast  sucralfate, 1 g, Oral, 4x Daily AC & at Bedtime  theophylline, 200 mg, Oral, Q24H             Diagnostic imaging:  I personally and independently reviewed the following images:  CT chest 12/31/2020: Mosaic groundglass opacities.      Assessment   1. COPD exacerbation  2. Acute on chronic hypoxic respiratory failure, on oxygen 2 l/min at baseline  3. Chronic diastolic CHF  4. Paroxysmal A. fib  5. Acute on chronic hyponatremia  6. Abnormal CT chest: Mosaic attenuation mostly consistent with peripheral obstruction  7. Steroid-induced hyperglycemia      Plan     · Seen in palliative.  She was asleep and she looked very comfortable.  This is the most comfortable I have seen her so far during this admission.  I agree with palliative care.  May discontinue her scheduled medications but defer that to her primary team    We will sign off    Octavio Lino MD  01/08/21  15:19 EST            This note was dictated utilizing Dragon dictation

## 2021-01-08 NOTE — PROGRESS NOTES
Continued Stay Note  [facility]     Patient Name: Eduarda Lopez  MRN: [id number]  Today's Date: 1/8/2021    Admit Date: 12/30/2020    Discharge Plan     Row Name 01/08/21 1556       Plan    Plan  Transferred to Palliative care; [facility] consult pending    Plan Comments  Patient transferred to Palliative care. [facility] consult called in and pending. CCP will follow and assist as needed. Ruby WERNER        Discharge Codes    No documentation.             ALPHONSO Villa

## 2021-01-08 NOTE — PLAN OF CARE
Goal Outcome Evaluation:  Plan of Care Reviewed With: patient  Progress: no change  Outcome Summary: Pt transferred to [facility] for palliative care at 1800. Pt appeared anxious at assessment, stating she needed something to help her nerves. 0.5mg SL ativan administered with relief; pt is resting comfortably. Skin assessed, incontinence care provided. Will cont to monitor

## 2021-01-08 NOTE — PROGRESS NOTES
"                                              LOS: 8 days   Patient Care Team:  Lisseth Vasquez APRN as PCP - General (Family Medicine)  Inocencio Duff MD as Consulting Physician (Pulmonary Disease)    Chief Complaint:  F/up COPD exacerbation, respiratory failure and medical problems listed below    Subjective   Interval History  Not feeling well she said.  She remains on the same amount of oxygen since yesterday but stated that her breathing is worse.  She has cough.  She is complaining about not getting the food she wants.    REVIEW OF SYSTEMS:     Constitutional: No fever or chills  GASTROINTESTINAL: No anorexia, nausea, vomiting or diarrhea. No abdominal pain or blood.       Ventilator/Non-Invasive Ventilation Settings (From admission, onward)    None                Physical Exam:     Vital Signs  Temp:  [98 °F (36.7 °C)-99.2 °F (37.3 °C)] 98 °F (36.7 °C)  Heart Rate:  [67-84] 84  Resp:  [14-20] 18  BP: (126-149)/(62-80) 137/62    Intake/Output Summary (Last 24 hours) at 1/7/2021 1914  Last data filed at 1/7/2021 1426  Gross per 24 hour   Intake 1000 ml   Output 750 ml   Net 250 ml     Flowsheet Rows      First Filed Value   Admission Height  172.7 cm (67.99\") Documented at 12/31/2020 1446   Admission Weight  81.5 kg (179 lb 10.8 oz) Documented at 12/30/2020 2300          General Appearance:   Alert, cooperative, in no acute distress   ENMT:  Mallampati score 3, moist mucous membrane   Eyes:  Pupils equal and reactive to light. EOMI   Neck:   Large. Trachea midline. No thyromegaly.   Lungs:    Diffuse bilateral expiratory wheezing and coarse breath sounds throughout the chest.    Heart:   Regular rhythm and normal rate, normal S1 and S2, no         murmur   Skin:   No abnormalities observed   Abdomen:    Obese. Soft. No tenderness. No HSM.   Neuro:  Conscious, alert, oriented x3. Strength 5/5 in upper and lower  ext   Extremities:  No cyanosis, clubbing or edema.  Warm extremities and well-perfused      "     Results Review:        Results from last 7 days   Lab Units 01/07/21  1309 01/07/21  0628 01/06/21  0702   SODIUM mmol/L 117* 120* 123*   POTASSIUM mmol/L 4.0 4.2 4.7   CHLORIDE mmol/L 80* 82* 86*   CO2 mmol/L 24.8 29.0 28.8   BUN mg/dL 21 20 24*   CREATININE mg/dL 0.83 0.68 0.74   GLUCOSE mg/dL 192* 89 92   CALCIUM mg/dL 8.8 8.6 8.7         Results from last 7 days   Lab Units 01/07/21  0628 01/06/21  0702 01/05/21  0431   WBC 10*3/mm3 14.81* 17.42* 15.79*   HEMOGLOBIN g/dL 12.0 11.7* 11.9*   HEMATOCRIT % 34.7 35.0 34.6   PLATELETS 10*3/mm3 348 359 334         Results from last 7 days   Lab Units 01/04/21  0630   PROBNP pg/mL 2,662.0*       I reviewed the patient's new clinical results.        Medication Review:   apixaban, 5 mg, Oral, Q12H  arformoterol, 15 mcg, Nebulization, BID - RT  budesonide, 1 mg, Nebulization, Daily - RT  citalopram, 20 mg, Oral, Daily  dilTIAZem CD, 120 mg, Oral, Q24H  guaiFENesin, 1,200 mg, Oral, Q12H  hydrALAZINE, 10 mg, Oral, Q8H  ipratropium-albuterol, 3 mL, Nebulization, 4x Daily - RT  levothyroxine, 50 mcg, Oral, Q AM  melatonin, 5 mg, Oral, Nightly  miconazole, , Topical, Q12H  oxybutynin XL, 10 mg, Oral, Daily  pantoprazole, 40 mg, Oral, Q AM  predniSONE, 40 mg, Oral, Daily With Breakfast  sucralfate, 1 g, Oral, 4x Daily AC & at Bedtime  theophylline, 200 mg, Oral, Q24H             Diagnostic imaging:  I personally and independently reviewed the following images:  CT chest 12/31/2020: Mosaic groundglass opacities.      Assessment   1. COPD exacerbation  2. Acute on chronic hypoxic respiratory failure, on oxygen 2 l/min at baseline  3. Chronic diastolic CHF  4. Paroxysmal A. fib  5. Acute on chronic hyponatremia  6. Abnormal CT chest: Mosaic attenuation mostly consistent with peripheral obstruction  7. Steroid-induced hyperglycemia      Plan     · Pulmicort and Brovana twice daily.  DuoNeb 4 times a day  · Mucinex to improve mucus clearance  · Encouraged to use flutter and  I-S  · Decrease prednisone to 30 mg daily.  She still wheezing quite a but but seems that she is heading more toward a palliative approach so we will reduce the prednisone dose to avoid agitation and psychosis  · Theophylline 200 mg daily and can increase further later if needed  · CPT 4 times a day  · Insulin PRN for hyperglycemia  · Eliquis for A. fib and VTE prophylaxis    Octavio Lino MD  01/07/21  19:14 EST            This note was dictated utilizing Dragon dictation

## 2021-01-09 NOTE — PROGRESS NOTES
[facility] Medicine Services  PROGRESS NOTE    Patient Name: Eduarda Lopez  : 1931  MRN: [id number]    Date of Admission: 2020  Primary Care Physician: Lisseth Vasquez APRN    Subjective   Subjective     CC:  Follow-up COPD    HPI:  Patient wanting to try a slightly higher dose of oral morphine due to knee pain and chronic knee issues and to help with breathing.  Otherwise hoping to go home with hospice today.    Review of Systems  No current fevers or chills  No current nausea, vomiting, or diarrhea  No current chest pain or palpitations      Objective   Objective     Vital Signs:   Temp:  [97.5 °F (36.4 °C)-99.1 °F (37.3 °C)] 97.5 °F (36.4 °C)  Heart Rate:  [80-94] 94  Resp:  [18-20] 20  BP: (108-128)/(56-70) 108/56        Physical Exam:  Constitutional:Awake, alert  HENT: NCAT, mucous membranes moist, neck supple  Respiratory: Scattered rhonchi bilaterally, respiratory effort normal, nonlabored breathing   Cardiovascular: RRR, normal radial pulses  Gastrointestinal: Positive bowel sounds, soft, nontender, nondistended  Musculoskeletal: Elderly frail, BMI is 27, no pitting edema   psychiatric: Calm but a little anxious affect, cooperative, conversational  Neurologic: No slurred speech or facial droop, follows commands  Skin: No rashes or jaundice, warm      Results Reviewed:  Results from last 7 days   Lab Units 21  0628 21  0702 21  0431   WBC 10*3/mm3 14.81* 17.42* 15.79*   HEMOGLOBIN g/dL 12.0 11.7* 11.9*   HEMATOCRIT % 34.7 35.0 34.6   PLATELETS 10*3/mm3 348 359 334   PROCALCITONIN ng/mL 0.08  --   --      Results from last 7 days   Lab Units 21  1309 21  0628 21  0702  21  0630   SODIUM mmol/L 117* 120* 123*   < > 118*   POTASSIUM mmol/L 4.0 4.2 4.7   < > 3.6   CHLORIDE mmol/L 80* 82* 86*   < > 81*   CO2 mmol/L 24.8 29.0 28.8   < > 25.9   BUN mg/dL  24*   < > 16   CREATININE mg/dL 0.83 0.68 0.74   < > 0.64   GLUCOSE mg/dL 192* 89 92   < > 96    CALCIUM mg/dL 8.8 8.6 8.7   < > 8.9   ALT (SGPT) U/L  --   --  20  --  24   AST (SGOT) U/L  --   --  21  --  28   PROBNP pg/mL  --   --   --   --  2,662.0*    < > = values in this interval not displayed.     Estimated Creatinine Clearance: 51.4 mL/min (by C-G formula based on SCr of 0.83 mg/dL).    Microbiology Results Abnormal     Procedure Component Value - Date/Time    Respiratory Panel PCR w/COVID-19(SARS-CoV-2) RU/SARAH/TIERNEY/PAD/COR/MAD/TERRENCE In-House, NP Swab in UTM/VTM, 3-4 HR TAT - Swab, Nasopharynx [[id number]]  (Normal) Collected: 01/01/21 0834    Lab Status: Final result Specimen: Swab from Nasopharynx Updated: 01/01/21 0946     ADENOVIRUS, PCR Not Detected     Coronavirus 229E Not Detected     Coronavirus HKU1 Not Detected     Coronavirus NL63 Not Detected     Coronavirus OC43 Not Detected     COVID19 Not Detected     Human Metapneumovirus Not Detected     Human Rhinovirus/Enterovirus Not Detected     Influenza A PCR Not Detected     Influenza B PCR Not Detected     Parainfluenza Virus 1 Not Detected     Parainfluenza Virus 2 Not Detected     Parainfluenza Virus 3 Not Detected     Parainfluenza Virus 4 Not Detected     RSV, PCR Not Detected     Bordetella pertussis pcr Not Detected     Bordetella parapertussis PCR Not Detected     Chlamydophila pneumoniae PCR Not Detected     Mycoplasma pneumo by PCR Not Detected    Narrative:      Fact sheet for providers: https://docs.[facility]/wp-content/uploads/EZX5316-3004-RJ9.1-EUA-Provider-Fact-Sheet-3.pdf    Fact sheet for patients: https://docs.[facility]/wp-content/uploads/HWO8158-7443-BF7.1-EUA-Patient-Fact-Sheet-1.pdf    Test performed by PCR.    Legionella Antigen, Urine - Urine, Urine, Clean Catch [[id number]]  (Normal) Collected: 12/31/20 1752    Lab Status: Final result Specimen: Urine, Clean Catch Updated: 12/1934     LEGIONELLA ANTIGEN, URINE Negative    COVID PRE-OP / PRE-PROCEDURE SCREENING ORDER (NO ISOLATION) - Swab, Nasopharynx [[id number]]   (Normal) Collected: 12/30/20 1833    Lab Status: Final result Specimen: Swab from Nasopharynx Updated: 12/30/20 2223    Narrative:      The following orders were created for panel order COVID PRE-OP / PRE-PROCEDURE SCREENING ORDER (NO ISOLATION) - Swab, Nasopharynx.  Procedure                               Abnormality         Status                     ---------                               -----------         ------                     COVID-19,APTIMA PANTHER,...[[id number]]  Normal              Final result                 Please view results for these tests on the individual orders.    COVID-19,APTIMA PANTHER,RU IN-HOUSE, NP/OP SWAB IN UTM/VTM/SALINE TRANSPORT MEDIA,24 HR TAT - Swab, Nasopharynx [[id number]]  (Normal) Collected: 12/30/20 1833    Lab Status: Final result Specimen: Swab from Nasopharynx Updated: 12/30/20 2223     COVID19 Not Detected    Narrative:      Fact sheet for providers: https://www.fda.gov/media/802214/download     Fact sheet for patients: https://www.fda.gov/media/813518/download    Test performed by PCR.          Imaging Results (Last 24 Hours)     ** No results found for the last 24 hours. **          Results for orders placed during the hospital encounter of 01/24/20   Adult Transthoracic Echo Complete W/ Cont if Necessary Per Protocol    Narrative · Left ventricular systolic function is hyperdynamic (EF > 70%).  · Left ventricular diastolic dysfunction is noted (grade I) consistent   with impaired relaxation.  · Normal right ventricular cavity size and systolic function noted.  · Left atrial cavity size is moderately dilated.  · There is mild calcification of the aortic valve leaflets.  · Mild tricuspid valve regurgitation is present.  · Calculated right ventricular systolic pressure from tricuspid   regurgitation is 31 mmHg.  · There is no evidence of pericardial effusion.          I have reviewed the medications:  Scheduled Meds:apixaban, 5 mg, Oral, Q12H  arformoterol, 15 mcg,  Nebulization, BID - RT  budesonide, 1 mg, Nebulization, Daily - RT  citalopram, 20 mg, Oral, Daily  dilTIAZem CD, 120 mg, Oral, Q24H  guaiFENesin, 1,200 mg, Oral, Q12H  hydrALAZINE, 10 mg, Oral, Q8H  ipratropium-albuterol, 3 mL, Nebulization, 4x Daily - RT  levothyroxine, 50 mcg, Oral, Q AM  melatonin, 5 mg, Oral, Nightly  miconazole, , Topical, Q12H  oxybutynin XL, 10 mg, Oral, Daily  pantoprazole, 40 mg, Oral, Q AM  predniSONE, 30 mg, Oral, Daily With Breakfast  sucralfate, 1 g, Oral, 4x Daily AC & at Bedtime  theophylline, 200 mg, Oral, Q24H      Continuous Infusions:   PRN Meds:.•  acetaminophen **OR** acetaminophen **OR** acetaminophen  •  acetaminophen  •  albuterol  •  aluminum-magnesium hydroxide-simethicone  •  bisacodyl  •  bisacodyl  •  diphenoxylate-atropine  •  Glycerin-Hypromellose-  •  LORazepam **OR** LORazepam **OR** LORazepam  •  Morphine **OR** morphine  •  muscle rub  •  ondansetron **OR** ondansetron  •  Insert peripheral IV **AND** sodium chloride  •  sodium chloride    Assessment/Plan   Assessment & Plan     Active Hospital Problems    Diagnosis  POA   • **COPD exacerbation (CMS/HCC) [J44.1]  Yes   • HTN (hypertension) [I10]  Unknown   • Acute vaginitis [N76.0]  No   • Primary osteoarthritis of both knees [M17.0]  Yes   • Hypokalemia [E87.6]  Yes   • Chronic anticoagulation [Z79.01]  Not Applicable   • Hypoxia [R09.02]  Yes   • Chronic respiratory failure with hypoxia (CMS/HCC) [J96.11]  Yes   • Elevated d-dimer [R79.89]  Yes   • Pressure injury of coccygeal region, stage 2 (CMS/HCC) [L89.152]  Yes   • PAF (paroxysmal atrial fibrillation) (CMS/HCC) [I48.0]  Yes   • Anemia [D64.9]  Yes   • Coronary artery disease [I25.10]  Yes   • Chronic diastolic CHF (congestive heart failure) (CMS/HCC) [I50.32]  Yes   • Acquired hypothyroidism [E03.9]  Yes   • Hyponatremia [E87.1]  Yes      Resolved Hospital Problems   No resolved problems to display.        Brief Hospital Course to date:  Eduarda  Jessica is a 89 y.o. female with history of asthma and COPD found to have acute COPD exacerbation.  She was seen by the palliative care team and evaluation and wants to pursue palliative treatment options.    Discussion/plan for today: Patient tolerating morphine despite reported history of allergy.  Per nursing request and patient request will try slightly higher sublingual dose.  Plan to follow-up on hospice evaluation today.  Patient stable to go home with hospice at any point.    COPD exacerbation (CMS/[facility])/chronic respiratory failure with hypoxia.  Pulmonology following and assisting with management.  Palliative care also helped.  Currently comfort focused treatment.  Continue current respiratory treatments and steroid taper.  Morphine as needed for air hunger.     Hyponatremia.  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  Comfort measures now no further lab studies.  Patient does not want a restricted diet.     Essential hypertension: Transition more to comfort focused measures.     Chronic diastolic CHF (congestive heart failure) (CMS/[facility]).   Can de-escalate cardiac medications as desired.     Coronary artery disease.  No evidence of ACS.     PAF (paroxysmal atrial fibrillation) (CMS/[facility]) /Chronic anticoagulation.  Rate controlled, diltiazem, apixaban.     Elevated d-dimer.  CTA chest report no evidence of pulmonary thromboembolism, multiple small areas of mild, groundglass opacity observable lungs bilaterally, nonspecific.  VRP negative to include COVID-19.     Pressure injury of coccygeal region, stage 2 (CMS/[facility]).  Wound care recommend follow hospital protocol for stage II pressure injury.  Comfort treatment.     Acute vaginitis. Topicals in place.     Primary osteoarthritis of both knees.  Complicating physical therapy.     Dysphagia.  Patient first comfort diet.    DVT Prophylaxis: On anticoagulation for now.      Disposition: I expect the patient to be discharged likely home hospice.    CODE STATUS:   Code Status and Medical  Interventions:   Ordered at: 01/07/21 1518     Level Of Support Discussed With:    Health Care Surrogate     Code Status:    No CPR     Medical Interventions (Level of Support Prior to Arrest):    Comfort Measures     Comments:    Breanna Mast MD  01/09/21

## 2021-01-09 NOTE — NURSING NOTE
Spoke with Dr. Mast regarding SL morphine 5mg ineffectiveness at treating patient's constant knee pain. Requested increase of SL morphine dose and also possible increase of IV morphine from 1mg PRN. Awaiting orders.

## 2021-01-09 NOTE — SIGNIFICANT NOTE
01/09/21 1421   OTHER   Discipline physical therapy assistant   Rehab Time/Intention   Session Not Performed patient/family declined treatment  (Pt states that she is hurting too much to do PT)   Recommendation   PT - Next Appointment 01/10/21

## 2021-01-09 NOTE — PLAN OF CARE
Goal Outcome Evaluation:  Plan of Care Reviewed With: patient, family  Progress: declining  Outcome Summary: Patient alert and complaining of arthritis and pain in joints this night shift. RN gave pt PRN SL morphine with no relief and used joint pain relief cream PRN with no relief. Pt requested stronger medication to tx discomfort and RN tx with 1mg of morphine and 0.5 mg of ativan with some relief. FC in place. Pt refusing turns at times and refusing some medications d/t desire to rest. Rice Crispys given this shift and water - pt tolerated well. PRN breathing tx called per patient request. Will continue to monitor.

## 2021-01-09 NOTE — PLAN OF CARE
Goal Outcome Evaluation:  Plan of Care Reviewed With: patient, family  Progress: no change  Outcome Summary: Patient c/o right knee pain. IV Morphine x1 and SL Morphine 10 x2 and Roman Caro with some relief. Scheduled meds givwn. Patient tolerating diet. Several family members visited today.

## 2021-01-10 NOTE — CONSULTS
\[facility]\"" consult received. Explanation of services provided and family is agreeable for home services. Patient is not GIP eligible, but is eligible for services at home under diagnosis of COPD (J44.9) Spoke with Dr Mast, he is agreeable to discharging patient home tomorrow. \[facility] [place] [facility]\"" to follow up with patient tomorrow. Thank you for referral and allowing us to participate in patient's care.      Lauren Bland RN, BSN   Referral and Admissions Coordinator   [facility]

## 2021-01-10 NOTE — PLAN OF CARE
Goal Outcome Evaluation:  Plan of Care Reviewed With: patient  Progress: no change  Outcome Summary: PRN meds for comfort. Rested comfortably. Tolerating diet. Family at bedside[facility]

## 2021-01-10 NOTE — PLAN OF CARE
Goal Outcome Evaluation:  Plan of Care Reviewed With: patient  Progress: no change   VSS, on 2L NC. PO morphine for pain. Rested better tonight. Will continue to monitor

## 2021-01-10 NOTE — PROGRESS NOTES
[facility] Medicine Services  PROGRESS NOTE    Patient Name: Eduarda Lopez  : 1931  MRN: [id number]    Date of Admission: 2020  Primary Care Physician: Lisseth Vasquez APRN    Subjective   Subjective     CC:  Follow-up COPD    HPI:  Patient wants to go home with hospice.  Pain better controlled.  No other new complaints.    Review of Systems  No current fevers or chills  No current nausea, vomiting, or diarrhea  No current chest pain or palpitations      Objective   Objective     Vital Signs:   Temp:  [97.6 °F (36.4 °C)-98.7 °F (37.1 °C)] 97.6 °F (36.4 °C)  Heart Rate:  [67-84] 80  Resp:  [18-20] 18  BP: (113-119)/(56-73) 119/73        Physical Exam:  Constitutional:Awake, alert  HENT: NCAT, mucous membranes moist, neck supple  Respiratory: Scattered rhonchi bilaterally, respiratory effort normal, nonlabored breathing   Cardiovascular: RRR, normal radial pulses  Gastrointestinal: Positive bowel sounds, soft, nontender, nondistended  Musculoskeletal: Elderly frail, BMI is 27, no pitting edema   psychiatric: Calm but a little anxious affect, cooperative, conversational  Neurologic: No slurred speech or facial droop, follows commands  Skin: No rashes or jaundice, warm      Results Reviewed:  Results from last 7 days   Lab Units 21  0628 21  0702 21  0431   WBC 10*3/mm3 14.81* 17.42* 15.79*   HEMOGLOBIN g/dL 12.0 11.7* 11.9*   HEMATOCRIT % 34.7 35.0 34.6   PLATELETS 10*3/mm3 348 359 334   PROCALCITONIN ng/mL 0.08  --   --      Results from last 7 days   Lab Units 21  1309 21  0628 21  0702  21  0630   SODIUM mmol/L 117* 120* 123*   < > 118*   POTASSIUM mmol/L 4.0 4.2 4.7   < > 3.6   CHLORIDE mmol/L 80* 82* 86*   < > 81*   CO2 mmol/L 24.8 29.0 28.8   < > 25.9   BUN mg/dL 21 20 24*   < > 16   CREATININE mg/dL 0.83 0.68 0.74   < > 0.64   GLUCOSE mg/dL 192* 89 92   < > 96   CALCIUM mg/dL 8.8 8.6 8.7   < > 8.9   ALT (SGPT) U/L  --   --  20  --  24   AST (SGOT)  U/L  --   --  21  --  28   PROBNP pg/mL  --   --   --   --  2,662.0*    < > = values in this interval not displayed.     Estimated Creatinine Clearance: 51.4 mL/min (by C-G formula based on SCr of 0.83 mg/dL).    Microbiology Results Abnormal     Procedure Component Value - Date/Time    Respiratory Panel PCR w/COVID-19(SARS-CoV-2) RU/SARAH/TIERNEY/PAD/COR/MAD/TERRENCE In-House, NP Swab in UTM/VTM, 3-4 HR TAT - Swab, Nasopharynx [[id number]]  (Normal) Collected: 01/01/21 0834    Lab Status: Final result Specimen: Swab from Nasopharynx Updated: 01/01/21 0946     ADENOVIRUS, PCR Not Detected     Coronavirus 229E Not Detected     Coronavirus HKU1 Not Detected     Coronavirus NL63 Not Detected     Coronavirus OC43 Not Detected     COVID19 Not Detected     Human Metapneumovirus Not Detected     Human Rhinovirus/Enterovirus Not Detected     Influenza A PCR Not Detected     Influenza B PCR Not Detected     Parainfluenza Virus 1 Not Detected     Parainfluenza Virus 2 Not Detected     Parainfluenza Virus 3 Not Detected     Parainfluenza Virus 4 Not Detected     RSV, PCR Not Detected     Bordetella pertussis pcr Not Detected     Bordetella parapertussis PCR Not Detected     Chlamydophila pneumoniae PCR Not Detected     Mycoplasma pneumo by PCR Not Detected    Narrative:      Fact sheet for providers: https://docs.[employer]/wp-content/uploads/TZI4339-4997-IW8.1-EUA-Provider-Fact-Sheet-3.pdf    Fact sheet for patients: https://docs.[employer]/wp-content/uploads/QWA2234-1624-IQ4.1-EUA-Patient-Fact-Sheet-1.pdf    Test performed by PCR.    Legionella Antigen, Urine - Urine, Urine, Clean Catch [[id number]]  (Normal) Collected: 12/31/20 1752    Lab Status: Final result Specimen: Urine, Clean Catch Updated: 12/1934     LEGIONELLA ANTIGEN, URINE Negative    COVID PRE-OP / PRE-PROCEDURE SCREENING ORDER (NO ISOLATION) - Swab, Nasopharynx [[id number]]  (Normal) Collected: 12/30/20 1833    Lab Status: Final result Specimen: Swab from  Nasopharynx Updated: 12/30/20 2223    Narrative:      The following orders were created for panel order COVID PRE-OP / PRE-PROCEDURE SCREENING ORDER (NO ISOLATION) - Swab, Nasopharynx.  Procedure                               Abnormality         Status                     ---------                               -----------         ------                     COVID-19,APTIMA PANTHER,...[[id number]]  Normal              Final result                 Please view results for these tests on the individual orders.    COVID-19,APTIMA PANTHER,RU IN-HOUSE, NP/OP SWAB IN UTM/VTM/SALINE TRANSPORT MEDIA,24 HR TAT - Swab, Nasopharynx [[id number]]  (Normal) Collected: 12/30/20 1833    Lab Status: Final result Specimen: Swab from Nasopharynx Updated: 12/30/20 2223     COVID19 Not Detected    Narrative:      Fact sheet for providers: https://www.fda.gov/media/524944/download     Fact sheet for patients: https://www.fda.gov/media/736715/download    Test performed by PCR.          Imaging Results (Last 24 Hours)     ** No results found for the last 24 hours. **          Results for orders placed during the hospital encounter of 01/24/20   Adult Transthoracic Echo Complete W/ Cont if Necessary Per Protocol    Narrative · Left ventricular systolic function is hyperdynamic (EF > 70%).  · Left ventricular diastolic dysfunction is noted (grade I) consistent   with impaired relaxation.  · Normal right ventricular cavity size and systolic function noted.  · Left atrial cavity size is moderately dilated.  · There is mild calcification of the aortic valve leaflets.  · Mild tricuspid valve regurgitation is present.  · Calculated right ventricular systolic pressure from tricuspid   regurgitation is 31 mmHg.  · There is no evidence of pericardial effusion.          I have reviewed the medications:  Scheduled Meds:apixaban, 5 mg, Oral, Q12H  arformoterol, 15 mcg, Nebulization, BID - RT  budesonide, 1 mg, Nebulization, Daily - RT  citalopram, 20 mg,  Oral, Daily  dilTIAZem CD, 120 mg, Oral, Q24H  guaiFENesin, 1,200 mg, Oral, Q12H  hydrALAZINE, 10 mg, Oral, Q8H  ipratropium-albuterol, 3 mL, Nebulization, 4x Daily - RT  levothyroxine, 50 mcg, Oral, Q AM  melatonin, 5 mg, Oral, Nightly  miconazole, , Topical, Q12H  oxybutynin XL, 10 mg, Oral, Daily  pantoprazole, 40 mg, Oral, Q AM  predniSONE, 30 mg, Oral, Daily With Breakfast  sucralfate, 1 g, Oral, 4x Daily AC & at Bedtime  theophylline, 200 mg, Oral, Q24H      Continuous Infusions:   PRN Meds:.•  acetaminophen **OR** acetaminophen **OR** acetaminophen  •  acetaminophen  •  albuterol  •  aluminum-magnesium hydroxide-simethicone  •  bisacodyl  •  bisacodyl  •  diphenoxylate-atropine  •  Glycerin-Hypromellose-  •  LORazepam **OR** LORazepam **OR** LORazepam  •  morphine  •  Morphine **OR** morphine  •  muscle rub  •  ondansetron **OR** ondansetron  •  Insert peripheral IV **AND** sodium chloride  •  sodium chloride    Assessment/Plan   Assessment & Plan     Active Hospital Problems    Diagnosis  POA   • **COPD exacerbation (CMS/HCC) [J44.1]  Yes   • HTN (hypertension) [I10]  Unknown   • Acute vaginitis [N76.0]  No   • Primary osteoarthritis of both knees [M17.0]  Yes   • Hypokalemia [E87.6]  Yes   • Chronic anticoagulation [Z79.01]  Not Applicable   • Hypoxia [R09.02]  Yes   • Chronic respiratory failure with hypoxia (CMS/HCC) [J96.11]  Yes   • Elevated d-dimer [R79.89]  Yes   • Pressure injury of coccygeal region, stage 2 (CMS/HCC) [L89.152]  Yes   • PAF (paroxysmal atrial fibrillation) (CMS/HCC) [I48.0]  Yes   • Anemia [D64.9]  Yes   • Coronary artery disease [I25.10]  Yes   • Chronic diastolic CHF (congestive heart failure) (CMS/HCC) [I50.32]  Yes   • Acquired hypothyroidism [E03.9]  Yes   • Hyponatremia [E87.1]  Yes      Resolved Hospital Problems   No resolved problems to display.        Brief Hospital Course to date:  Eduarda Lopez is a 89 y.o. female with history of asthma and COPD found to have  acute COPD exacerbation.  She was seen by the palliative care team and evaluation and wants to pursue palliative treatment options.    Discussion/plan for today: I have discussed her case with hospice team today.  They have spoken with family who wants to take her home with hospice tomorrow.  Continue current treatment and working to arrange discharge tomorrow    COPD exacerbation (CMS/[facility])/chronic respiratory failure with hypoxia.  Pulmonology following and assisting with management.  Palliative care also helped.  Currently comfort focused treatment.  Continue current respiratory treatments and steroid taper.  Morphine as needed for air hunger.     Hyponatremia.  [place] to be SIADH.  Comfort measures now no further lab studies.  Patient does not want a restricted diet.     Essential hypertension: Transition more to comfort focused measures.     Chronic diastolic CHF (congestive heart failure) (CMS/[facility]).   Can de-escalate cardiac medications as desired.     Coronary artery disease.  No evidence of ACS.     PAF (paroxysmal atrial fibrillation) (CMS/[facility]) /Chronic anticoagulation.  Rate controlled, diltiazem, apixaban.     Elevated d-dimer.  CTA chest report no evidence of pulmonary thromboembolism, multiple small areas of mild, groundglass opacity observable lungs bilaterally, nonspecific.  VRP negative to include COVID-19.     Pressure injury of coccygeal region, stage 2 (CMS/[facility]).  Wound care recommend follow hospital protocol for stage II pressure injury.  Comfort treatment.     Acute vaginitis. Topicals in place.     Primary osteoarthritis of both knees.  Complicating physical therapy.     Dysphagia.  Patient first comfort diet.    DVT Prophylaxis: On anticoagulation for now.      Disposition: I expect the patient to be discharged likely home hospice.    CODE STATUS:   Code Status and Medical Interventions:   Ordered at: 01/07/21 0076     Level Of Support Discussed With:    Health Care Surrogate     Code Status:     No CPR     Medical Interventions (Level of Support Prior to Arrest):    Comfort Measures     Comments:    Breanna Mast MD  01/10/21

## 2021-01-11 PROBLEM — Z51.5 COMFORT MEASURES ONLY STATUS: Status: ACTIVE | Noted: 2021-01-01

## 2021-01-11 NOTE — PROGRESS NOTES
Discharge Planning Assessment  [facility]     Patient Name: Eduarda Lopez  MRN: [id number]  Today's Date: 1/11/2021    Admit Date: 12/30/2020    Discharge Needs Assessment    No documentation.       Discharge Plan     Row Name 01/11/21 1850       Plan    Plan  home with Grandaughter and [facility]    Final Discharge Disposition Code  01 - home or self-care    Final Note  Patient discharged home with granddaughter and will have [facility] care at home. [facility] transported[facility] Pool RN        Continued Care and Services - Discharged on 1/11/2021 Admission date: 12/30/2020 - Discharge disposition: Hospice/Home    Destination     Service Provider Request Status Selected Services Address Phone Fax Patient Preferred    [facility]  Pending - Request Sent N/A [address] [facility] [address] [phone] --    [facility]  Pending - Request Sent N/A [address] [facility] [address] [phone] --    [facility]  Pending - Request Sent N/A [address] [facility] [address] [phone] --    [facility]  Pending - Request Sent N/A [address] [facility] [id number] [phone] --          Home Medical Care     Service Provider Request Status Selected Services Address Phone Fax Patient Preferred    [facility]   Selected Home Health Services [address] [address] [phone] --              Expected Discharge Date and Time     Expected Discharge Date Expected Discharge Time    Jan 11, 2021         Demographic Summary    No documentation.       Functional Status    No documentation.       Psychosocial    No documentation.       Abuse/Neglect    No documentation.       Legal    No documentation.       Substance Abuse    No documentation.       Patient Forms    No documentation.           Melinda Carbajal, RN

## 2021-01-11 NOTE — DISCHARGE SUMMARY
[facility] Medicine Services  DISCHARGE SUMMARY    Patient Name: Eduarda Lopez  : 1931  MRN: [id number]    Date of Admission: 2020  6:18 PM  Date of Discharge:  2021  Primary Care Physician: Lisseth Vasquez APRN    Consults     Date and Time Order Name Status Description    2021 1116 Inpatient Nephrology Consult Completed     2020 0928 Inpatient Pulmonology Consult Completed     2020 2121 A (on-call MD unless specified) Details Completed           Hospital Course     Presenting Problem:   COPD exacerbation (CMS/HCC) [J44.1]  Acute on chronic respiratory failure with hypoxia (CMS/HCC) [J96.21]    Active Hospital Problems    Diagnosis  POA   • **COPD exacerbation (CMS/HCC) [J44.1]  Yes   • Comfort measures only status [Z51.5]  Not Applicable   • HTN (hypertension) [I10]  Yes   • Acute vaginitis [N76.0]  No   • Primary osteoarthritis of both knees [M17.0]  Yes   • Hypokalemia [E87.6]  Yes   • Chronic anticoagulation [Z79.01]  Not Applicable   • Hypoxia [R09.02]  Yes   • Chronic respiratory failure with hypoxia (CMS/HCC) [J96.11]  Yes   • Elevated d-dimer [R79.89]  Yes   • Pressure injury of coccygeal region, stage 2 (CMS/HCC) [L89.152]  Yes   • PAF (paroxysmal atrial fibrillation) (CMS/HCC) [I48.0]  Yes   • Anemia [D64.9]  Yes   • Coronary artery disease [I25.10]  Yes   • Chronic diastolic CHF (congestive heart failure) (CMS/HCC) [I50.32]  Yes   • Acquired hypothyroidism [E03.9]  Yes   • Hyponatremia [E87.1]  Yes      Resolved Hospital Problems   No resolved problems to display.          Hospital Course:  Eduarda Lopez is a 89 y.o. female  with history of asthma and COPD found to have acute COPD exacerbation.  She was seen by the palliative care team and evaluation and wants to pursue palliative treatment options.  She has discontinued some of her home medications.  Hospice will need to continue to discuss goals of care and home medication discharge list with her to  determine what she wants to continue and stop long-term as she proceeds in the hospice program with comfort focused care.     Discussion/plan for today: I have discussed her case with hospice team today.  They have spoken with family who wants to take her home with hospice today.      COPD exacerbation (CMS/HCC)/chronic respiratory failure with hypoxia.  Pulmonology following and assisted with management.  Palliative care also helped.  Currently comfort focused treatment.  Continue current respiratory treatments and steroid taper.  Morphine as needed for air hunger.  Patient feels that the morphine has been very beneficial and nursing reports she is not oversedated despite previous history of morphine intolerance she is tolerating this medication very well.  I discussed the risks, benefits, and alternatives to narcotic therapies.  After counseling, due to pain severity, the patient feels narcotics are needed to adequately controlled their pain.  They agreed to only take the medication as prescribed, to hold with any sign of sedation, and to avoid driving while actively using narcotics.     Hyponatremia.  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  Comfort measures now no further lab studies.  Patient does not want a restricted diet.  She was seen by nephrology who recommended restricting diet and other medications but patient has declined the recommendations and opted for palliative care     Essential hypertension: Transition more to comfort focused measures.     Chronic diastolic CHF (congestive heart failure) (CMS/[facility]).   Can de-escalate cardiac medications as desired.     Coronary artery disease.  No evidence of ACS.     PAF (paroxysmal atrial fibrillation) (CMS/[facility]) /Chronic anticoagulation.  Rate controlled, diltiazem, apixaban.     Elevated d-dimer.  CTA chest report no evidence of pulmonary thromboembolism, multiple small areas of mild, groundglass opacity observable lungs bilaterally, nonspecific.  Studies negative include  COVID-19.     Pressure injury of coccygeal region, stage 2 (CMS/HCC).  Wound care recommend follow hospital protocol for stage II pressure injury.  Comfort treatment.     Acute vaginitis.  Treated     Primary osteoarthritis of both knees.  Complicating physical therapy.     Dysphagia.  Patient first comfort diet.    Please note that dragon voice recognition software was used to create this note    Day of Discharge     HPI:   Eager to go home today with hospice.  Feels the morphine is really helping and wants to go home with this.    ROS:  No current fevers or chills  Positive for shortness of breath and cough  No current nausea, vomiting, or diarrhea  No current chest pain or palpitations      Vital Signs:   Temp:  [97.4 °F (36.3 °C)] 97.4 °F (36.3 °C)  Heart Rate:  [67-90] 80  Resp:  [16-18] 18  BP: (116-118)/(58-60) 116/58     Physical Exam:  Constitutional:Awake, alert  HENT: NCAT, mucous membranes moist, neck supple  Respiratory: Scattered rhonchi bilaterally, respiratory effort normal, nonlabored breathing currently on supplemental oxygen  Cardiovascular: RRR, normal radial pulses  Gastrointestinal: Positive bowel sounds, soft, nontender, nondistended  Musculoskeletal: Elderly frail, BMI is 27, no pitting edema   psychiatric: Calm but a little anxious affect, cooperative, conversational  Neurologic: No slurred speech or facial droop, follows commands  Skin: No rashes or jaundice, warm    Pertinent  and/or Most Recent Results     Results from last 7 days   Lab Units 01/07/21  1309 01/07/21  0628 01/06/21  0702 01/05/21  1522 01/05/21  0431 01/04/21  1338   WBC 10*3/mm3  --  14.81* 17.42*  --  15.79*  --    HEMOGLOBIN g/dL  --  12.0 11.7*  --  11.9*  --    HEMATOCRIT %  --  34.7 35.0  --  34.6  --    PLATELETS 10*3/mm3  --  348 359  --  334  --    SODIUM mmol/L 117* 120* 123* 122* 123* 116*   POTASSIUM mmol/L 4.0 4.2 4.7 4.2 4.0 3.3*   CHLORIDE mmol/L 80* 82* 86* 86* 85* 79*   CO2 mmol/L 24.8 29.0 28.8 23.3 24.8  25.4   BUN mg/dL 21 20 24* 23 18  --    CREATININE mg/dL 0.83 0.68 0.74 0.82 0.64  --    GLUCOSE mg/dL 192* 89 92 156* 101*  --    CALCIUM mg/dL 8.8 8.6 8.7 8.8 8.7  --      Results from last 7 days   Lab Units 01/06/21  0702   BILIRUBIN mg/dL 0.6   ALK PHOS U/L 87   ALT (SGPT) U/L 20   AST (SGOT) U/L 21           Invalid input(s): TG, LDLCALC, LDLREALC  Results from last 7 days   Lab Units 01/07/21  0628 01/04/21  1338   TSH uIU/mL  --  0.203*   PROCALCITONIN ng/mL 0.08  --        Brief Urine Lab Results  (Last result in the past 365 days)      Color   Clarity   Blood   Leuk Est   Nitrite   Protein   CREAT   Urine HCG        11/24/20 1049 Yellow Clear Trace Trace Negative Negative               Microbiology Results Abnormal     Procedure Component Value - Date/Time    Respiratory Panel PCR w/COVID-19(SARS-CoV-2) RU/SARAH/TIERNEY/PAD/COR/MAD/TERRENCE In-House, NP Swab in UTM/VTM, 3-4 HR TAT - Swab, Nasopharynx [[id number]]  (Normal) Collected: 01/01/21 0834    Lab Status: Final result Specimen: Swab from Nasopharynx Updated: 01/01/21 0946     ADENOVIRUS, PCR Not Detected     Coronavirus 229E Not Detected     Coronavirus HKU1 Not Detected     Coronavirus NL63 Not Detected     Coronavirus OC43 Not Detected     COVID19 Not Detected     Human Metapneumovirus Not Detected     Human Rhinovirus/Enterovirus Not Detected     Influenza A PCR Not Detected     Influenza B PCR Not Detected     Parainfluenza Virus 1 Not Detected     Parainfluenza Virus 2 Not Detected     Parainfluenza Virus 3 Not Detected     Parainfluenza Virus 4 Not Detected     RSV, PCR Not Detected     Bordetella pertussis pcr Not Detected     Bordetella parapertussis PCR Not Detected     Chlamydophila pneumoniae PCR Not Detected     Mycoplasma pneumo by PCR Not Detected    Narrative:      Fact sheet for providers: https://docs.hubbu[employer].com/wp-content/uploads/MZQ9987-6744-MQ2.1-EUA-Provider-Fact-Sheet-3.pdf    Fact sheet for patients:  https://docs.[employer]/wp-content/uploads/LLW9209-8070-RL0.1-EUA-Patient-Fact-Sheet-1.pdf    Test performed by PCR.    Legionella Antigen, Urine - Urine, Urine, Clean Catch [[id number]]  (Normal) Collected: 12/31/20 1752    Lab Status: Final result Specimen: Urine, Clean Catch Updated: 12/1934     LEGIONELLA ANTIGEN, URINE Negative    COVID PRE-OP / PRE-PROCEDURE SCREENING ORDER (NO ISOLATION) - Swab, Nasopharynx [[id number]]  (Normal) Collected: 12/30/20 1833    Lab Status: Final result Specimen: Swab from Nasopharynx Updated: 12/30/20 2223    Narrative:      The following orders were created for panel order COVID PRE-OP / PRE-PROCEDURE SCREENING ORDER (NO ISOLATION) - Swab, Nasopharynx.  Procedure                               Abnormality         Status                     ---------                               -----------         ------                     COVID-19,APTIMA PANTHER,...[[id number]]  Normal              Final result                 Please view results for these tests on the individual orders.    COVID-19,APTIMA PANTHER,RU IN-HOUSE, NP/OP SWAB IN UTM/VTM/SALINE TRANSPORT MEDIA,24 HR TAT - Swab, Nasopharynx [[id number]]  (Normal) Collected: 12/30/20 1833    Lab Status: Final result Specimen: Swab from Nasopharynx Updated: 12/30/20 2223     COVID19 Not Detected    Narrative:      Fact sheet for providers: https://www.fda.gov/media/107693/download     Fact sheet for patients: https://www.fda.gov/media/164090/download    Test performed by PCR.          Imaging Results (All)     Procedure Component Value Units Date/Time    FL Esophagram Complete Single Contrast [[id number]] Collected: 01/07/21 1222     Updated: 01/07/21 1228    Narrative:      Limited fluoroscopic esophagram     HISTORY: Dysphagia       Impression:      TECHNIQUE, FINDINGS AND IMPRESSION:   image was obtained. Subtle asymmetric pulmonary opacification is  present within the left upper and lower lung correlating  with  groundglass opacification seen in these locations on recent CTA.  Findings are nonspecific but suggestive of atypical pneumonia in the  appropriate clinical context and correlation with patient history is  recommended. Covid 19 is a differential consideration. No pneumothorax  or pleural effusion is seen.  Heart is normal in size. Right humeral  arthroplasty is present.     The patient's difficulty with following directions and mobility  significantly limited this evaluation and only single contrast images  and oblique and lateral planes were able to be performed.     Patient demonstrated moderate to severe esophageal dysmotility with  significant delay in contrast clearance from the mid to distal esophagus  with associated tertiary contractions and retrograde flow of contrast.  No findings of significant persistent esophageal stenosis to suggest  stricture formation. There is apparent mucosal thickening throughout the  esophagus most suggestive of underlying esophagitis and correlation with  endoscopy should be considered depending on patient's operative status.     The degree of esophageal dysmotility place this patient at increased  risk for aspiration and the technologist was instructed to keep the  patient in a semierect upright position and to instruct the transport  and nursing staff to do the same.     FLUOROSCOPY TIME: 1 minute 44 seconds, 42 images.     This report was finalized on 1/7/2021 12:25 PM by Dr. Rabmo Dow M.D.       CT Angiogram Chest With & Without Contrast [[id number]] Collected: 12/31/20 0831     Updated: 12/31/20 0838    Narrative:      CT ANGIOGRAM CHEST     HISTORY: Low to intermediate suspicion of pulmonary embolism. Positive  d-dimer.     TECHNIQUE: CT angiogram of the chest with multiplanar and  three-dimensional reformatted images was performed using 95 mL of  Isovue-370 contrast and is correlated with chest x-ray performed  yesterday. There is no previous cross-sectional  imaging of the chest for  correlation.     Radiation dose reduction techniques were utilized, including automated  exposure control and exposure modulation based on body size.     FINDINGS: The thoracic aorta is normal in caliber and enhances normally.  The pulmonary arteries are also normal in caliber and enhance normally.  There is no pulmonary thromboembolism.     There is atheromatous vascular calcification along the coronary arteries  and elsewhere in the chest. There is no pleural or pericardial effusion.  There is no thoracic lymphadenopathy. There are several small areas of  ground glass density scattered about the lungs bilaterally. This is  nonspecific and most consistent with pneumonitis, although the etiology  is unknown. COVID 19 pneumonia might have this appearance, although this  is not typical. Aspiration pneumonitis and other infectious etiologies  are considerations. The pattern is also not typical for pulmonary edema.  There are some secretions partially filling lower lung zone bronchioles.     There is degenerative change in the thoracic spine and at the left  shoulder and arthroplasty hardware at the right shoulder.       Impression:      No evidence of pulmonary thromboembolism. Multiple small  areas of mild, ground glass opacity is observed in the lungs  bilaterally, nonspecific.     This report was finalized on 12/31/2020 8:35 AM by Dr. Catracho Spencer M.D.       XR Chest 1 View [[id number]] Collected: 12/30/20 1914     Updated: 12/30/20 1922    Narrative:      XR CHEST 1 VW-     HISTORY: Female who is 89 years-old,  short of breath     TECHNIQUE: Frontal view of the chest     COMPARISON: 09/18/2020     FINDINGS: The heart size is borderline. Aorta is tortuous, calcified.  Pulmonary vasculature is unremarkable. No focal pulmonary consolidation,  pleural effusion, or pneumothorax. No acute osseous process.       Impression:      No focal pulmonary consolidation. Borderline heart  size.  Tortuous aorta. Follow-up as clinically indicated.     This report was finalized on 12/30/2020 7:19 PM by Dr. Zackery Calderón M.D.             Results for orders placed during the hospital encounter of 12/30/20   Duplex Venous Lower Extremity - Bilateral CAR    Narrative · Normal bilateral lower extremity venous duplex scan.          Results for orders placed during the hospital encounter of 12/30/20   Duplex Venous Lower Extremity - Bilateral CAR    Narrative · Normal bilateral lower extremity venous duplex scan.          Results for orders placed during the hospital encounter of 01/24/20   Adult Transthoracic Echo Complete W/ Cont if Necessary Per Protocol    Narrative · Left ventricular systolic function is hyperdynamic (EF > 70%).  · Left ventricular diastolic dysfunction is noted (grade I) consistent   with impaired relaxation.  · Normal right ventricular cavity size and systolic function noted.  · Left atrial cavity size is moderately dilated.  · There is mild calcification of the aortic valve leaflets.  · Mild tricuspid valve regurgitation is present.  · Calculated right ventricular systolic pressure from tricuspid   regurgitation is 31 mmHg.  · There is no evidence of pericardial effusion.          Discharge Details        Discharge Medications      New Medications      Instructions Start Date   aluminum-magnesium hydroxide-simethicone 400-400-40 MG/5ML suspension  Commonly known as: MAALOX MAX   15 mL, Oral, Every 6 Hours PRN      morphine 100 MG/5ML solution concentrated solution   5 mg, Oral, Every 1 Hour PRN      ondansetron 4 MG tablet  Commonly known as: ZOFRAN   4 mg, Oral, Every 6 Hours PRN      predniSONE 10 MG tablet  Commonly known as: DELTASONE   10 mg, Oral, Daily      theophylline 400 MG 24 hr tablet  Commonly known as: UNIPHYL   200 mg, Oral, Daily         Continue These Medications      Instructions Start Date   acetaminophen 650 MG 8 hr tablet  Commonly known as: TYLENOL   650 mg,  Oral, Every 8 Hours PRN      apixaban 5 MG tablet tablet  Commonly known as: ELIQUIS   5 mg, Oral, 2 Times Daily      budesonide 1 MG/2ML nebulizer solution  Commonly known as: PULMICORT   1 mg, Nebulization, Daily - RT      citalopram 20 MG tablet  Commonly known as: CeleXA   20 mg, Oral, Daily      dilTIAZem 120 MG 24 hr capsule  Commonly known as: TIAZAC   120 mg, Oral, Daily      guaiFENesin 600 MG 12 hr tablet  Commonly known as: MUCINEX   1,200 mg, Oral, As Needed      ipratropium-albuterol 0.5-2.5 mg/3 ml nebulizer  Commonly known as: DUO-NEB   3 mL, Nebulization, Every 4 Hours PRN      levothyroxine 50 MCG tablet  Commonly known as: SYNTHROID, LEVOTHROID   50 mcg, Oral, Daily      MIRALAX PO   1 tablet, Oral, Daily      oxybutynin XL 10 MG 24 hr tablet  Commonly known as: DITROPAN-XL   10 mg, Oral, Daily      pantoprazole 40 MG EC tablet  Commonly known as: PROTONIX   40 mg, Oral      Perforomist 20 MCG/2ML nebulizer solution  Generic drug: formoterol   Nebulization, 2 Times Daily - RT      sucralfate 1 g tablet  Commonly known as: CARAFATE   1 g, Oral, 4 Times Daily         Stop These Medications    aspirin 81 MG tablet     cefdinir 300 MG capsule  Commonly known as: OMNICEF     Cetirizine HCl 10 MG capsule     clonazePAM 0.5 MG tablet  Commonly known as: KlonoPIN     docusate sodium 100 MG capsule  Commonly known as: COLACE     famotidine 40 MG tablet  Commonly known as: PEPCID     fluticasone 50 MCG/ACT nasal spray  Commonly known as: FLONASE     furosemide 40 MG tablet  Commonly known as: Lasix     montelukast 10 MG tablet  Commonly known as: SINGULAIR     multivitamin tablet tablet  Commonly known as: THERAGRAN     NIFEdipine CC 30 MG 24 hr tablet  Commonly known as: ADALAT CC     traMADol 50 MG tablet  Commonly known as: ULTRAM     Vitamin D3 50 MCG (2000 UT) tablet            Allergies   Allergen Reactions   • Codeine    • Ibuprofen    • Morphine And Related    • Penicillins          Discharge  Disposition:  Hospice/Home    Diet:  Hospital:  Diet Order   Procedures   • Diet Soft Texture; Chopped       Activity:  Up with assistance otherwise as tolerated         CODE STATUS:    Code Status and Medical Interventions:   Ordered at: 01/07/21 1518     Level Of Support Discussed With:    Health Care Surrogate     Code Status:    No CPR     Medical Interventions (Level of Support Prior to Arrest):    Comfort Measures     Comments:    Breanna Mast MD  01/11/21      Time Spent on Discharge:  I spent  37  minutes on this discharge activity which included: face-to-face encounter with the patient, reviewing the data in the system, coordination of the care with the nursing staff as well as consultants, documentation, and entering orders.

## 2021-01-11 NOTE — PROGRESS NOTES
"Physicians Statement of Medical Necessity for  Ambulance Transportation    GENERAL INFORMATION     Name: Eduarda Lopez  YOB: 1931  Medicare #: [id number]  Transport Date: 1/11/2021   Origin: [facility]  Destination: [facility] [place] [address]  Is the Patient's stay covered under Medicare Part A (PPS/DRG?)Yes  Closest appropriate facility? Yes  If this a hosp-hosp transfer? No  Is this a hospice patient? No    MEDICAL NECESSITY QUESTIONAIRE    Ambulance Transportation is medically necessary only if other means of transportation are contraindicated or would be potentially harmful to the patient.  To meet this requirement, the patient must be either \"bed confined\" or suffer from a condition such that transport by means other than an ambulance is contraindicated by the patient's condition.  The following questions must be answered by the healthcare professional signing below for this form to be valid:     1) Describe the MEDICAL CONDITION (physical and/or mental) of this patient AT THE TIME OF AMBULANCE TRANSPORT that requires the patient to be transported in an ambulance, and why transport by other means is contraindicated by the patient's condition:    Active Hospital Problems    Diagnosis   • **COPD exacerbation (CMS/HCC)   • Comfort measures only status   • HTN (hypertension)   • Acute vaginitis   • Primary osteoarthritis of both knees   • Hypokalemia   • Chronic anticoagulation   • Hypoxia   • Chronic respiratory failure with hypoxia (CMS/HCC)   • Elevated d-dimer   • Pressure injury of coccygeal region, stage 2 (CMS/HCC)   • PAF (paroxysmal atrial fibrillation) (CMS/HCC)   • Anemia   • Coronary artery disease   • Chronic diastolic CHF (congestive heart failure) (CMS/HCC)   • Acquired hypothyroidism   • Hyponatremia       Past Medical History:   Diagnosis Date   • Arthritis    • Asthma    • CHF (congestive heart failure) (CMS/HCC)    • Disease of thyroid gland    • Fracture of hip " "(CMS/[facility])    • Hyperlipidemia    • Hypertension    • Primary osteoarthritis of both knees 1/3/2021   • Thyroid disease       Past Surgical History:   Procedure Laterality Date   • CHOLECYSTECTOMY     • HIP SURGERY     • HYSTERECTOMY     • JOINT REPLACEMENT     • SHOULDER SURGERY     • THYROID SURGERY     • TONSILLECTOMY        2) Is this patient \"bed confined\" as defined below?Yes   To be \"bed confined\" the patient must satisfy all three of the following criteria:  (1) unable to get up from bed without assistance; AND (2) unable to ambulate;  AND (3) unable to sit in a chair or wheelchair.  3) Can this patient safely be transported by car or wheelchair van (I.e., may safely sit during transport, without an attendant or monitoring?)No   4. In addition to completing questions 1-3 above, please check any of the following conditions that apply*:          *Note: supporting documentation for any boxes checked must be maintained in the patient's medical records Patient is confused      SIGNATURE OF PHYSICIAN OR OTHER AUTHORIZED HEALTHCARE PROFESSIONAL    I certify that the above information is true and correct based on my evaluation of this patient, and represent that the patient requires transport by ambulance and that other forms of transport are contraindicated.  I understand that this information will be used by the Centers for Medicare and Medicaid Services (CMS) to support the determiniation of medical necessity for ambulance services, and I represent that I have personal knowledge of the patient's condition at the time of transport.       If this box is checked, I also certify that the patient is physically or mentally incapable of signing the ambulance service's claim form and that the institution with which I am affiliated has furnished care, services or assistance to the patient.  My signature below is made on behalf of the patient pursuant to 42 .36(b)(4). In accordance with 42 .37, the specific " reason(s) that the patient is physically or mentally incapable of signing the claim for is as follows:     Signature of Physician or Healthcare Professional     Melinda Carbajal RN   Date/Time:   1/11/2021  15:51 EST       (For Scheduled repetitive transport, this form is not valid for transports performed more than 60 days after this date).                                                                                                                                            --------------------------------------------------------------------------------------------  Printed Name and Credentials of Physician or Authorized Healthcare Professional     *Form must be signed by patient's attending physician for scheduled, repetitive transports,.  For non-repetitive ambulance transports, if unable to obtain the signature of the attending physician, any of the following may sign (please select below):     Physician  Clinical Nurse Specialist  Registered Nurse     Physician Assistant  Discharge Planner  Licensed Practical Nurse     Nurse Practitioner

## 2021-01-11 NOTE — NURSING NOTE
Spoke with clark pierson, no questions or concerns about care once patient is home. Supplies sent for toribio care. Breanna stated that hospice will be out tonight to give her further instructions.

## 2021-01-11 NOTE — PROGRESS NOTES
Met with patient and granddaughter/HCS (Breanna) at bedside. Explanation of services reviewed with focus on home Hospar support. Answered all questions, discussed medications, symptom management needs, DME needs, and goals of care. [facility]. [facility] consent forms completed and signed via granddaughter/HCS (Breanna), per patient request. [facility] information provided and encouraged granddaughter to call [facility] once patient arrives home or with any other needs.     DME ordered for delivery : oxygen, BSC, and attached trapeze    Medications sent to outpatient pharmacy : Ativan Intensol, Senna, and Dulcolax    Granddaughter verbalizes she will  medications at pharmacy. Per CCP (Melinda), EMS scheduled for 4 PM .    Please call with any questions, concerns, or change in patient status. Thank you for allowing us the opportunity to participate in the care of this patient/family.    Jailyn Childs RN  Referral and Admission Coordinator  [facility]  [phone]

## 2021-01-11 NOTE — PLAN OF CARE
Goal Outcome Evaluation:  Plan of Care Reviewed With: patient  Progress: no change  Outcome Summary: rested some overnight. Medicated x 2 for pain. Plans to d/c home today. Continue to monitor.

## 2021-04-08 PROBLEM — N39.0 UTI (URINARY TRACT INFECTION), BACTERIAL: Status: ACTIVE | Noted: 2021-01-01

## 2021-04-08 PROBLEM — K59.09 CHRONIC CONSTIPATION: Status: ACTIVE | Noted: 2021-01-01

## 2021-04-08 PROBLEM — N89.8 VAGINAL HEMATOMA: Status: ACTIVE | Noted: 2021-01-01

## 2021-04-08 PROBLEM — Z78.9 DO NOT INTUBATE BUT USE ALL OTHER MEASURES: Status: ACTIVE | Noted: 2021-01-01

## 2021-04-08 PROBLEM — F32.A DEPRESSION: Status: ACTIVE | Noted: 2021-01-01

## 2021-04-08 PROBLEM — D64.9 ANEMIA: Status: ACTIVE | Noted: 2021-01-01

## 2021-04-08 PROBLEM — F03.90 DEMENTIA (HCC): Status: ACTIVE | Noted: 2021-01-01

## 2021-04-08 PROBLEM — E87.1 HYPONATREMIA: Status: ACTIVE | Noted: 2021-01-01

## 2021-04-08 PROBLEM — H10.9 CONJUNCTIVITIS: Status: ACTIVE | Noted: 2021-01-01

## 2021-04-08 PROBLEM — A49.9 UTI (URINARY TRACT INFECTION), BACTERIAL: Status: ACTIVE | Noted: 2021-01-01

## 2021-04-08 NOTE — ED NOTES
Consent verified with Madhuri CERDA RN. Granddaughter MARVEL Chun gave verbal consent for administration of blood products.      Zhane Mercado RN  04/08/21 0754

## 2021-04-08 NOTE — ED NOTES
"To ER via EMS from [facility].  Pt sent for Hgb 6.2.  Facility also noted \"bruising to vagina\".  Pt also has chronic f/c with blood tinged urine.  Pt is not on blood thinners.      Pt is hospice pt.     Pt placed in mask at time of triage.  Triage staff in appropriate PPE.      Radha Herring RN  04/08/21 0557    "

## 2021-04-08 NOTE — PLAN OF CARE
Goal Outcome Evaluation:  Plan of Care Reviewed With: patient     Outcome Summary: Orders received.  Pt demonstrated no overt s/s aspiration with thin by cup/straw, puree, fork mashed.  Pt acknowledged difficulty with and incomplete mastication of soft solids w/o teeth.  REC fork mashed diet with thin liquids.  Meds as tolerated.  Upright with all PO.  Once dentures arrive and are in place may upgrade.

## 2021-04-08 NOTE — PROGRESS NOTES
"                                                                           [facility] Visit Report    Eduarda Lopez  [id number]  4/8/2021        Review of Visit (Include All Collaboration- including names of hospital and family involved during admission/visit):  SBT MSW completed subsequent visit in hospital. Pt admitted from facility where [facility] was following. Pt admitted with UTI, dark stool/bleeding, and bruising. Collaborated with SBT RN on unit prior to visiting pt. Joint call placed with granddaughter/DPOA to discuss goals and treatment plan. A condition update given. SBT RN provided extensive education on disease progression, and provided all options for care. Granddaughter elected full comfort care, and agreeable to transfer to  pending bed availability. Reviewed code status. At first granddaughter stated CPR , no intubation but after further discussion stated she does not want pt to suffer, and verbalized DNR. Team provided updated to hospital RN, who then notified attending.   Due to concern of pt's physical condition, [facility] hometeam placing APS referral for further investigation. Facility aware, and granddaughter has been updated. If pt becomes stable, granddaughter reports will transfer back to [facility].  Met with pt at bedside, who appears alert to self and pleasantly confused. Unable to recall the event that lead to hospitalization, only able to confirm \"my poop was dark.\" Provided reassurance of comfort and support at bedside.  CCM aware of pt. SBT to continue to follow daily. SW available as needed.    *DPOA being uploaded to system from [facility] EMR        SIMI Godoy    "

## 2021-04-08 NOTE — ED PROVIDER NOTES
EMERGENCY DEPARTMENT ENCOUNTER    Room Number:  16/16  Date of encounter:  4/8/2021  PCP: Brock Rich MD  Patient Care Team:  Brock Rich MD as PCP - General (Geriatric Medicine)   Historian: Sending facility    HPI:  Chief Complaint: Low hemoglobin  A complete HPI/ROS/PMH/PSH/SH/FH are unobtainable due to: Patient's difficulty hearing    Context: Eduarda Lopez is a 89 y.o. female who presents to the ED c/o abnormal laboratory.  She apparently had a hemoglobin of 6.2.  The sending facility noticed some bruising around her vagina.  They also report that she has a Fang catheter with blood-tinged urine.  The patient is not able to provide any history.    Prior record review: No prior visits in our system.  She had laboratory work performed yesterday with hemoglobin of 6.2.    PAST MEDICAL HISTORY  Active Ambulatory Problems     Diagnosis Date Noted   • No Active Ambulatory Problems     Resolved Ambulatory Problems     Diagnosis Date Noted   • No Resolved Ambulatory Problems     No Additional Past Medical History         PAST SURGICAL HISTORY  No past surgical history on file.      FAMILY HISTORY  No family history on file.      SOCIAL HISTORY  Social History     Socioeconomic History   • Marital status:      Spouse name: Not on file   • Number of children: Not on file   • Years of education: Not on file   • Highest education level: Not on file         ALLERGIES  Penicillins        REVIEW OF SYSTEMS  Review of Systems   Unobtainable due to patient's difficulty hearing  All systems reviewed and negative except for those discussed in HPI.       PHYSICAL EXAM    I have reviewed the triage vital signs and nursing notes.    ED Triage Vitals [04/08/21 0556]   Temp Heart Rate Resp BP SpO2   97.3 °F (36.3 °C) 72 16 110/67 98 %      Temp src Heart Rate Source Patient Position BP Location FiO2 (%)   Tympanic -- -- -- --       Physical Exam  GENERAL: Awake, alert, no acute distress  SKIN: Warm,  dry  HENT: Normocephalic, atraumatic  EYES: no scleral icterus  CV: regular rhythm, regular rate  RESPIRATORY: normal effort, lungs clear  ABDOMEN: soft, nontender, nondistended  MUSCULOSKELETAL: no deformity, ecchymosis to the suprapubic region, left thigh.  Distal pulses strong.  Sensation and motor intact.  No bony tenderness.  NEURO: alert, moves all extremities, follows commands          LAB RESULTS  Recent Results (from the past 24 hour(s))   Protime-INR    Collection Time: 04/07/21  3:55 PM    Specimen: Blood   Result Value Ref Range    Protime 17.8 (H) 11.7 - 14.2 Seconds    INR 1.49 (H) 0.90 - 1.10   CBC Auto Differential    Collection Time: 04/07/21  3:55 PM    Specimen: Blood   Result Value Ref Range    WBC 7.51 3.40 - 10.80 10*3/mm3    RBC 2.30 (L) 3.77 - 5.28 10*6/mm3    Hemoglobin 6.2 (C) 12.0 - 15.9 g/dL    Hematocrit 18.9 (C) 34.0 - 46.6 %    MCV 82.2 79.0 - 97.0 fL    MCH 27.0 26.6 - 33.0 pg    MCHC 32.8 31.5 - 35.7 g/dL    RDW 13.9 12.3 - 15.4 %    RDW-SD 41.0 37.0 - 54.0 fl    MPV 8.8 6.0 - 12.0 fL    Platelets 404 140 - 450 10*3/mm3    Neutrophil % 67.8 42.7 - 76.0 %    Lymphocyte % 17.6 (L) 19.6 - 45.3 %    Monocyte % 9.9 5.0 - 12.0 %    Eosinophil % 4.0 0.3 - 6.2 %    Basophil % 0.3 0.0 - 1.5 %    Immature Grans % 0.4 0.0 - 0.5 %    Neutrophils, Absolute 5.10 1.70 - 7.00 10*3/mm3    Lymphocytes, Absolute 1.32 0.70 - 3.10 10*3/mm3    Monocytes, Absolute 0.74 0.10 - 0.90 10*3/mm3    Eosinophils, Absolute 0.30 0.00 - 0.40 10*3/mm3    Basophils, Absolute 0.02 0.00 - 0.20 10*3/mm3    Immature Grans, Absolute 0.03 0.00 - 0.05 10*3/mm3    nRBC 0.0 0.0 - 0.2 /100 WBC   Comprehensive Metabolic Panel    Collection Time: 04/08/21  6:14 AM    Specimen: Blood   Result Value Ref Range    Glucose 84 65 - 99 mg/dL    BUN 9 8 - 23 mg/dL    Creatinine 0.50 (L) 0.57 - 1.00 mg/dL    Sodium 115 (C) 136 - 145 mmol/L    Potassium 4.9 3.5 - 5.2 mmol/L    Chloride 80 (L) 98 - 107 mmol/L    CO2 28.7 22.0 - 29.0 mmol/L     Calcium 8.1 (L) 8.6 - 10.5 mg/dL    Total Protein 5.4 (L) 6.0 - 8.5 g/dL    Albumin 3.10 (L) 3.50 - 5.20 g/dL    ALT (SGPT) 26 1 - 33 U/L    AST (SGOT) 47 (H) 1 - 32 U/L    Alkaline Phosphatase 66 39 - 117 U/L    Total Bilirubin 2.4 (H) 0.0 - 1.2 mg/dL    eGFR Non African Amer 116 >60 mL/min/1.73    Globulin 2.3 gm/dL    A/G Ratio 1.3 g/dL    BUN/Creatinine Ratio 18.0 7.0 - 25.0    Anion Gap 6.3 5.0 - 15.0 mmol/L   Protime-INR    Collection Time: 04/08/21  6:14 AM    Specimen: Blood   Result Value Ref Range    Protime 17.5 (H) 11.7 - 14.2 Seconds    INR 1.46 (H) 0.90 - 1.10   aPTT    Collection Time: 04/08/21  6:14 AM    Specimen: Blood   Result Value Ref Range    PTT 52.3 (H) 22.7 - 35.4 seconds   Type & Screen    Collection Time: 04/08/21  6:14 AM    Specimen: Blood   Result Value Ref Range    ABO Type O     RH type Positive     Antibody Screen Negative     T&S Expiration Date 4/11/2021 11:59:59 PM    CBC Auto Differential    Collection Time: 04/08/21  6:14 AM    Specimen: Blood   Result Value Ref Range    WBC 6.83 3.40 - 10.80 10*3/mm3    RBC 2.34 (L) 3.77 - 5.28 10*6/mm3    Hemoglobin 6.5 (C) 12.0 - 15.9 g/dL    Hematocrit 19.4 (C) 34.0 - 46.6 %    MCV 82.9 79.0 - 97.0 fL    MCH 27.8 26.6 - 33.0 pg    MCHC 33.5 31.5 - 35.7 g/dL    RDW 14.0 12.3 - 15.4 %    RDW-SD 41.6 37.0 - 54.0 fl    MPV 8.5 6.0 - 12.0 fL    Platelets 404 140 - 450 10*3/mm3    Neutrophil % 65.4 42.7 - 76.0 %    Lymphocyte % 19.3 (L) 19.6 - 45.3 %    Monocyte % 10.2 5.0 - 12.0 %    Eosinophil % 4.1 0.3 - 6.2 %    Basophil % 0.4 0.0 - 1.5 %    Immature Grans % 0.6 (H) 0.0 - 0.5 %    Neutrophils, Absolute 4.46 1.70 - 7.00 10*3/mm3    Lymphocytes, Absolute 1.32 0.70 - 3.10 10*3/mm3    Monocytes, Absolute 0.70 0.10 - 0.90 10*3/mm3    Eosinophils, Absolute 0.28 0.00 - 0.40 10*3/mm3    Basophils, Absolute 0.03 0.00 - 0.20 10*3/mm3    Immature Grans, Absolute 0.04 0.00 - 0.05 10*3/mm3    nRBC 0.0 0.0 - 0.2 /100 WBC   Urinalysis With Culture If  Indicated - Urine, Catheter    Collection Time: 04/08/21  7:30 AM    Specimen: Urine, Catheter   Result Value Ref Range    Color, UA Yellow Yellow, Straw    Appearance, UA Turbid (A) Clear    pH, UA 6.0 5.0 - 8.0    Specific Gravity, UA 1.025 1.005 - 1.030    Glucose, UA Negative Negative    Ketones, UA Negative Negative    Bilirubin, UA Small (1+) (A) Negative    Blood, UA Moderate (2+) (A) Negative    Protein,  mg/dL (2+) (A) Negative    Leuk Esterase, UA Small (1+) (A) Negative    Nitrite, UA Positive (A) Negative    Urobilinogen, UA 1.0 E.U./dL 0.2 - 1.0 E.U./dL   Urinalysis, Microscopic Only - Urine, Catheter    Collection Time: 04/08/21  7:30 AM    Specimen: Urine, Catheter   Result Value Ref Range    RBC, UA Unable to determine due to loaded field (A) None Seen, 0-2 /HPF    WBC, UA Too Numerous to Count (A) None Seen, 0-2 /HPF    Bacteria, UA Unable to determine due to loaded field (A) None Seen /HPF    Squamous Epithelial Cells, UA Unable to determine due to loaded field (A) None Seen, 0-2 /HPF    Hyaline Casts, UA Unable to determine due to loaded field None Seen /LPF    Methodology Manual Light Microscopy    Prepare RBC, 2 Units    Collection Time: 04/08/21  7:58 AM   Result Value Ref Range    Product Code O9848H27     Unit Number [id number]     UNIT  ABO O     UNIT  RH POS     Crossmatch Interpretation Compatible     Dispense Status XM     Blood Expiration Date 202105122359     Blood Type Barcode 5100     Product Code U5130T48     Unit Number [id number]     UNIT  ABO O     UNIT  RH POS     Crossmatch Interpretation Compatible     Dispense Status IS     Blood Expiration Date 202105122359     Blood Type Barcode 5100        Ordered the above labs and independently reviewed the results.        RADIOLOGY  No Radiology Exams Resulted Within Past 24 Hours    I ordered the above noted radiological studies. Reviewed by me and discussed with radiologist.  See dictation for official radiology  interpretation.      PROCEDURES    Critical Care  Performed by: Foster Moscoso MD  Authorized by: Foster Moscoso MD     Critical care provider statement:     Critical care time was exclusive of:  Separately billable procedures and treating other patients    Critical care was necessary to treat or prevent imminent or life-threatening deterioration of the following conditions:  CNS failure or compromise and circulatory failure    Critical care was time spent personally by me on the following activities:  Development of treatment plan with patient or surrogate, discussions with consultants, evaluation of patient's response to treatment, examination of patient, ordering and review of laboratory studies, ordering and performing treatments and interventions, pulse oximetry, re-evaluation of patient's condition and review of old charts  Comments:      Between 30&74 minutes          MEDICATIONS GIVEN IN ER    Medications   sodium chloride 0.9 % flush 10 mL (has no administration in time range)   sodium chloride 0.9 % bolus 1,000 mL (1,000 mL Intravenous New Bag 4/8/21 0827)   cefTRIAXone (ROCEPHIN) IVPB 1 g (has no administration in time range)         PROGRESS, DATA ANALYSIS, CONSULTS, AND MEDICAL DECISION MAKING    All labs have been independently reviewed by me.  All radiology studies have been reviewed by me and discussed with radiologist dictating the report.   EKG's independently viewed and interpreted by me.  Discussion below represents my analysis of pertinent findings related to patient's condition, differential diagnosis, treatment plan and final disposition.        ED Course as of Apr 08 0851   Thu Apr 08, 2021   0759 Brown stool, heme-negative    [TR]   0800 Stage I coccygeal decubitus ulcer present    [TR]   0814 Sodium(!!): 115 [TR]   0814 Hemoglobin(!!): 6.5 [TR]   0814 WBC, UA(!): Too Numerous to Count [TR]   0818 No expanding hematoma in the left thigh or groin.  No pulsatile mass.  Vital signs have  been stable while here.  Imaging unlikely to be helpful acutely in this scenario.    [TR]   0823 Speaking with Cari with [facility].    [TR]   0836 Nursing reports no documented falls or injury.  They did x-rays of the hip and femur which were negative.    [TR]   0849 Speaking with Cari with [facility].  Will admit to Dr. Castro.    [TR]      ED Course User Index  [TR] Foster Moscoso MD           PPE: Both the patient and I wore a surgical mask throughout the entire patient encounter. I wore protective goggles.       AS OF 08:51 EDT VITALS:    BP - 118/64  HR - 69  TEMP - 98 °F (36.7 °C) (Oral)  O2 SATS - 99%        DIAGNOSIS  Final diagnoses:   Hyponatremia   Acute anemia   Urinary tract infection associated with indwelling urethral catheter, initial encounter (CMS/[facility])   Traumatic ecchymosis of left thigh, initial encounter         DISPOSITION  ED Disposition     ED Disposition Condition Comment    Decision to Admit  Level of Care: Telemetry [5]   Diagnosis: Hyponatremia [990211]   Admitting Physician: KELSEY CASTRO [[id number]]   Attending Physician: KELSEY CASTRO [[id number]]   Certification: I Certify That Inpatient Hospital Services Are Medically Necessary For Greater Than 2 Midnights                  Foster Moscoso MD  04/08/21 5807       Foster Moscoso MD  06/28/21 4850

## 2021-04-08 NOTE — DISCHARGE SUMMARY
Patient Name: Eduarda Lopez  : 1931  MRN: [id number]    Date of Admission: 2021  Date of Discharge:  2021  Primary Care Physician: Brock Rich MD      Chief Complaint:   Abnormal Lab      Discharge Diagnoses     Active Hospital Problems    Diagnosis  POA   • **Anemia [D64.9]  Yes   • Hyponatremia [E87.1]  Yes   • UTI (urinary tract infection), bacterial [N39.0, A49.9]  Yes   • Conjunctivitis [H10.9]  Yes   • Depression [F32.9]  Yes   • Chronic constipation [K59.09]  Yes   • Do not intubate but use all other measures [Z78.9]  Yes   • Vaginal hematoma [N89.8]  Yes   • Dementia (CMS/HCC) [F03.90]  Yes      Resolved Hospital Problems   No resolved problems to display.        Hospital Course     Ms. Lopez is a 89 y.o. with a history of dementia and constipation (awaiting further information from the nursing home to address any other medical history) that presents to [facility] from the nursing home due to low hemoglobin. Routine labs were drawn and she was found to have a hemoglobin of 6.2. Patient is a poor historian given history of dementia, therefore this HPI will be taken from ED records as well as Breanna GRIER. Patient has a chronic toribio catheter in which she states was changed prior to admission. There is large vaginal hematoma in which looks new as well as a left lower extremity hematoma in which looks a little older. She denies recent falls or injury though is also confused at times.  On admission hemoglobin was 6.2, sodium 115, chloride 80, UA with acute infection, INR 1.49.     After speaking with the hospice nurse, the patient's POA has decided to make her comfort care only and she will be transferred to palliative care. Order set and transfer have been placed.            Day of Discharge     Subjective:  patient resting comfortably.     ROS unobtainable due to dementia     Physical Exam:  Temp:  [97.3 °F (36.3 °C)-98.4 °F (36.9 °C)] 98.2 °F (36.8 °C)  Heart  Rate:  [64-85] 73  Resp:  [16-20] 18  BP: (110-153)/(53-93) 115/66  There is no height or weight on file to calculate BMI.  Physical Exam  Vitals and nursing note reviewed.   Constitutional:       Appearance: She is obese. She is ill-appearing.   HENT:      Head: Normocephalic and atraumatic.   Eyes:      General:         Right eye: Discharge present.      Extraocular Movements: Extraocular movements intact.   Cardiovascular:      Rate and Rhythm: Normal rate and regular rhythm.   Pulmonary:      Effort: Pulmonary effort is normal. No respiratory distress.      Breath sounds: Normal breath sounds.   Abdominal:      General: Bowel sounds are normal. There is no distension.      Palpations: Abdomen is soft.      Tenderness: There is no abdominal tenderness.   Genitourinary:     Comments: vaginal hematoma  Musculoskeletal:         General: No swelling or tenderness.      Cervical back: Normal range of motion and neck supple.   Skin:     General: Skin is warm and dry.   Neurological:      Mental Status: She is alert. Mental status is at baseline. She is disoriented.   Psychiatric:         Mood and Affect: Mood normal.         Judgment: Judgment is inappropriate.         Consultants     Consult Orders (all) (From admission, onward)        Procedures     * Surgery not found *      Imaging Results (All)     None            Pertinent Labs     Results from last 7 days   Lab Units 04/08/21  0614 04/07/21  1555   WBC 10*3/mm3 6.83 7.51   HEMOGLOBIN g/dL 6.5* 6.2*   PLATELETS 10*3/mm3 404 404     Results from last 7 days   Lab Units 04/08/21  0614   SODIUM mmol/L 115*   POTASSIUM mmol/L 4.9   CHLORIDE mmol/L 80*   CO2 mmol/L 28.7   BUN mg/dL 9   CREATININE mg/dL 0.50*   GLUCOSE mg/dL 84   CrCl cannot be calculated (Unknown ideal weight.).  Results from last 7 days   Lab Units 04/08/21  0614   ALBUMIN g/dL 3.10*   BILIRUBIN mg/dL 2.4*   ALK PHOS U/L 66   AST (SGOT) U/L 47*   ALT (SGPT) U/L 26     Results from last 7 days   Lab  Units 04/08/21  0614   CALCIUM mg/dL 8.1*   ALBUMIN g/dL 3.10*               Invalid input(s): LDLCALC          Test Results Pending at Discharge     Pending Labs     Order Current Status    COVID PRE-OP / PRE-PROCEDURE SCREENING ORDER (NO ISOLATION) - Swab, Nasopharynx In process    COVID-19,[facility]TIMA PANTHER,[facility] IN-HOUSE, NP/OP SWAB IN UTM/VTM/SALINE TRANSPORT MEDIA,24 HR TAT - Swab, Nasopharynx In process    Iron Profile In process    Osmolality, Serum In process    Sodium In process    TSH In process    Urine Culture - Urine, Urine, Catheter In process          Discharge Details        Discharge Medications      ASK your doctor about these medications      Instructions Start Date   acetaminophen 325 MG tablet  Commonly known as: TYLENOL   650 mg, Oral, Every 4 Hours PRN      acetaminophen 325 MG tablet  Commonly known as: TYLENOL   650 mg, Oral, 2 times daily      aluminum-magnesium hydroxide-simethicone 400-400-40 MG/5ML suspension  Commonly known as: MAALOX MAX   15 mL, Oral, Every 6 Hours PRN      Biscolax 10 MG suppository  Generic drug: bisacodyl  Ask about: Which instructions should I use?   10 mg, Rectal, Daily PRN      ipratropium-albuterol 0.5-2.5 mg/3 ml nebulizer  Commonly known as: DUO-NEB   3 mL, Nebulization, Every 4 Hours PRN             Allergies   Allergen Reactions   • Penicillins Unknown - Low Severity         Discharge Disposition:  Hospice/Medical Facility (DC - External)    Discharge Diet:  Diet Order   Procedures   • NPO Diet       Discharge Activity:       CODE STATUS:    Code Status and Medical Interventions:   Ordered at: 04/08/21 1047     Limited Support to NOT Include:    Intubation     Code Status:    CPR     Medical Interventions (Level of Support Prior to Arrest):    Limited       No future appointments.  Follow-up Information     Brock Rich MD .    Specialty: Geriatric Medicine  Contact information:  [address] [place] [address]  [phone]                    Time Spent on Discharge:  Greater than 30 minutes      CALEB Breen  [facility]  04/08/21  13:47 EDT

## 2021-04-08 NOTE — ED NOTES
Nursing report ED to floor  Eduarda Lopez  89 y.o.  female    HPI (triage note):   Chief Complaint   Patient presents with   • Abnormal Lab       Admitting doctor:   Bandar Castro MD    Admitting diagnosis:   The primary encounter diagnosis was Hyponatremia. Diagnoses of Acute anemia, Urinary tract infection associated with indwelling urethral catheter, initial encounter (CMS/[facility]), and Traumatic ecchymosis of left thigh, initial encounter were also pertinent to this visit.    Code status:   Current Code Status     Date Active Code Status Order ID Comments User Context       Not on file    Advance Care Planning Activity          Allergies:   Penicillins    Weight:   There were no vitals filed for this visit.    Most recent vitals:   Vitals:    04/08/21 0954 04/08/21 0955 04/08/21 1010 04/08/21 1025   BP:  125/65 121/70 117/63   Pulse: 75  75 70   Resp:       Temp:       TempSrc:       SpO2: 100% 100% 100% 100%       Active LDAs/IV Access:   Lines, Drains & Airways    Active LDAs     Name:   Placement date:   Placement time:   Site:   Days:    Peripheral IV 04/08/21 0613 Right Antecubital   04/08/21    0613    Antecubital   less than 1    Peripheral IV 04/08/21 0831 Left Forearm   04/08/21    0831    Forearm   less than 1                Labs (abnormal labs have a star):   Labs Reviewed   COMPREHENSIVE METABOLIC PANEL - Abnormal; Notable for the following components:       Result Value    Creatinine 0.50 (*)     Sodium 115 (*)     Chloride 80 (*)     Calcium 8.1 (*)     Total Protein 5.4 (*)     Albumin 3.10 (*)     AST (SGOT) 47 (*)     Total Bilirubin 2.4 (*)     All other components within normal limits    Narrative:     GFR Normal >60  Chronic Kidney Disease <60  Kidney Failure <15     PROTIME-INR - Abnormal; Notable for the following components:    Protime 17.5 (*)     INR 1.46 (*)     All other components within normal limits   APTT - Abnormal; Notable for the following components:    PTT 52.3 (*)     All  other components within normal limits   CBC WITH AUTO DIFFERENTIAL - Abnormal; Notable for the following components:    RBC 2.34 (*)     Hemoglobin 6.5 (*)     Hematocrit 19.4 (*)     Lymphocyte % 19.3 (*)     Immature Grans % 0.6 (*)     All other components within normal limits   URINALYSIS W/ CULTURE IF INDICATED - Abnormal; Notable for the following components:    Appearance, UA Turbid (*)     Bilirubin, UA Small (1+) (*)     Blood, UA Moderate (2+) (*)     Protein,  mg/dL (2+) (*)     Leuk Esterase, UA Small (1+) (*)     Nitrite, UA Positive (*)     All other components within normal limits   URINALYSIS, MICROSCOPIC ONLY - Abnormal; Notable for the following components:    RBC, UA Unable to determine due to loaded field (*)     WBC, UA Too Numerous to Count (*)     Bacteria, UA Unable to determine due to loaded field (*)     Squamous Epithelial Cells, UA Unable to determine due to loaded field (*)     All other components within normal limits    Narrative:     Microscopic performed on un spun specimen. Urine volume less than 2 cc   URINE CULTURE   COVID PRE-OP / PRE-PROCEDURE SCREENING ORDER (NO ISOLATION)    Narrative:     The following orders were created for panel order COVID PRE-OP / PRE-PROCEDURE SCREENING ORDER (NO ISOLATION) - Swab, Nasopharynx.  Procedure                               Abnormality         Status                     ---------                               -----------         ------                     COVID-19,APTIMA PANTHER,...[[id number]]                      In process                   Please view results for these tests on the individual orders.   COVID-19,[facility] IN-HOUSE,NP/OP SWAB IN UTM/VTM/SALINE TRANSPORT MEDIA,24 HR TAT   OSMOLALITY, URINE   SODIUM, URINE, RANDOM   OSMOLALITY, SERUM   TYPE AND SCREEN   PREPARE RBC   CBC AND DIFFERENTIAL    Narrative:     The following orders were created for panel order CBC & Differential.  Procedure                                Abnormality         Status                     ---------                               -----------         ------                     CBC Auto Differential[[id number]]        Abnormal            Final result                 Please view results for these tests on the individual orders.       EKG:   No orders to display       Meds given in ED:   Medications   sodium chloride 0.9 % flush 10 mL (has no administration in time range)   sodium chloride 0.9 % bolus 1,000 mL (0 mL Intravenous Stopped 4/8/21 1041)   cefTRIAXone (ROCEPHIN) IVPB 1 g (0 g Intravenous Stopped 4/8/21 1041)       Imaging results:  No radiology results for the last day    Ambulatory status:   Full Assist     Social issues:   Social History     Socioeconomic History   • Marital status:      Spouse name: Not on file   • Number of children: Not on file   • Years of education: Not on file   • Highest education level: Not on file    Nursing report ED to floor     Zhane Mercado RN  04/08/21 1049

## 2021-04-08 NOTE — PROGRESS NOTES
Clinical Pharmacy Services: Medication History    Eduarda Lopez is a 89 y.o. female presenting to [facility] for   Chief Complaint   Patient presents with   • Abnormal Lab       She  has no past medical history on file.    Allergies as of 04/08/2021 - Reviewed 04/08/2021   Allergen Reaction Noted   • Penicillins Unknown - Low Severity 04/08/2021       Medication information was obtained from: group home paperwork  Pharmacy and Phone Number:     Prior to Admission Medications     Prescriptions Last Dose Informant Patient Reported? Taking?    acetaminophen (TYLENOL) 325 MG tablet  Nursing Home Yes Yes    Take 650 mg by mouth Every 4 (Four) Hours As Needed for Mild Pain  or Fever.    acetaminophen (TYLENOL) 325 MG tablet  Nursing Home Yes Yes    Take 650 mg by mouth 2 (two) times a day.    aluminum-magnesium hydroxide-simethicone (MAALOX MAX) 400-400-40 MG/5ML suspension  Nursing Home Yes Yes    Take 15 mL by mouth Every 6 (Six) Hours As Needed for Indigestion or Heartburn.    bisacodyl (Biscolax) 10 MG suppository  Nursing Home Yes Yes    Insert 10 mg into the rectum Daily As Needed.    citalopram (CeleXA) 20 MG tablet  Nursing Home Yes Yes    Take 20 mg by mouth Daily.    formoterol (PERFOROMIST) 20 MCG/2ML nebulizer solution  Nursing Home Yes Yes    Take 20 mcg by nebulization 2 (Two) Times a Day.    guaiFENesin (MUCINEX) 600 MG 12 hr tablet  Nursing Home Yes Yes    Take 1,200 mg by mouth 2 (Two) Times a Day.    ipratropium-albuterol (DUO-NEB) 0.5-2.5 mg/3 ml nebulizer  Nursing Home Yes Yes    Take 3 mL by nebulization Every 4 (Four) Hours As Needed for Wheezing.    levothyroxine (SYNTHROID, LEVOTHROID) 50 MCG tablet  Nursing Home Yes Yes    Take 50 mcg by mouth Daily.    LORazepam (ATIVAN) 2 MG/ML concentrated solution  Nursing Home Yes Yes    Take 0.5 mg by mouth Every 4 (Four) Hours As Needed for Anxiety.    magnesium hydroxide (MILK OF MAGNESIA) 400 MG/5ML suspension  Nursing Home Yes Yes     Take 30 mL by mouth Daily As Needed for Constipation.    morphine 100 MG/5ML solution concentrated solution  Nursing Home Yes Yes    Take 5 mg by mouth Every 4 (Four) Hours As Needed.    nystatin (MYCOSTATIN) 344363 UNIT/GM powder  Nursing Home Yes Yes    Apply  topically to the appropriate area as directed 2 (Two) Times a Day.    ondansetron ODT (ZOFRAN-ODT) 4 MG disintegrating tablet  Nursing Home Yes Yes    Place 4 mg on the tongue Every 6 (Six) Hours As Needed for Nausea or Vomiting.    oxybutynin XL (DITROPAN-XL) 10 MG 24 hr tablet  Nursing Home Yes Yes    Take 10 mg by mouth Every Night.    pantoprazole (PROTONIX) 40 MG EC tablet  Nursing Home Yes Yes    Take 40 mg by mouth Daily.    polyethylene glycol (MiraLax) 17 GM/SCOOP powder  Nursing Home Yes Yes    Take 17 g by mouth Daily.    senna (senna) 8.6 MG tablet  Nursing Home Yes Yes    Take 2 tablets by mouth Daily As Needed for Constipation.    Sodium Phosphates (fleet enema) 7-19 GM/118ML enema  Nursing Home Yes Yes    Insert 1 enema into the rectum 1 (One) Time As Needed.    sucralfate (CARAFATE) 1 g tablet  Nursing Home Yes Yes    Take 1 g by mouth 4 (Four) Times a Day.    tamsulosin (FLOMAX) 0.4 MG capsule 24 hr capsule  Nursing Home Yes Yes    Take 1 capsule by mouth Daily.            Medication notes:     This medication list is complete to the best of my knowledge as of 4/8/2021    Please call if questions.    Ani Huber [facility]  Medication History Technician  [phone]    4/8/2021 12:59 EDT

## 2021-04-08 NOTE — H&P
Patient Name:  Eduarda Lopez  YOB: 1931  MRN:  [id number]  Admit Date:  4/8/2021  Patient Care Team:  Brock Rich MD as PCP - General (Geriatric Medicine)      Subjective   History Present Illness     Chief Complaint   Patient presents with   • Abnormal Lab     HPI  Ms. Lopez is a 89 y.o. with a history of dementia and constipation (awaiting further information from the nursing home to address any other medical history) that presents to [facility] from the nursing home due to low hemoglobin. Routine labs were drawn and she was found to have a hemoglobin of 6.2. Patient is a poor historian given history of dementia, therefore this HPI will be taken from ED records as well as Breanna GRIER. Patient has a chronic toribio catheter in which she states was changed prior to admission. There is large vaginal hematoma in which looks new as well as a left lower extremity hematoma in which looks a little older. She denies recent falls or injury though is also confused at times.  On admission hemoglobin was 6.2, sodium 115, chloride 80, UA with acute infection, INR 1.49.          Review of Systems   Unable to perform ROS: Dementia (confused at times)   Constitutional: Negative for chills and fever.   Eyes: Positive for discharge. Negative for itching.        Personal History     No past medical history on file.  No past surgical history on file.  No family history on file.  Social History     Tobacco Use   • Smoking status: Not on file   Substance Use Topics   • Alcohol use: Not on file   • Drug use: Not on file     No current facility-administered medications on file prior to encounter.     Current Outpatient Medications on File Prior to Encounter   Medication Sig Dispense Refill   • acetaminophen (TYLENOL) 325 MG tablet Take 650 mg by mouth Every 4 (Four) Hours As Needed for Mild Pain .     • aluminum-magnesium hydroxide-simethicone (MAALOX/MYLANTA) 200-200-20 MG/5ML suspension  Take 5 mL by mouth Every 6 (Six) Hours As Needed for Indigestion or Heartburn.     • bisacodyl (Biscolax) 10 MG suppository Insert 10 mg into the rectum Daily.     • bisacodyl (DULCOLAX) 5 MG EC tablet Take 10 mg by mouth Daily As Needed for Constipation.     • citalopram (CeleXA) 20 MG tablet Take 20 mg by mouth Daily.     • guaiFENesin (MUCINEX) 600 MG 12 hr tablet Take 1,200 mg by mouth 2 (Two) Times a Day.     • ipratropium-albuterol (DUO-NEB) 0.5-2.5 mg/3 ml nebulizer Take 3 mL by nebulization Every 4 (Four) Hours As Needed for Wheezing.     • LORazepam (ATIVAN) 2 MG/ML concentrated solution Take 0.25 mg by mouth Every 4 (Four) Hours As Needed for Anxiety.     • magnesium hydroxide (MILK OF MAGNESIA) 400 MG/5ML suspension Take  by mouth Daily As Needed for Constipation.     • morphine 100 MG/5ML solution concentrated solution Take 4 mg by mouth Every 2 (Two) Hours As Needed.     • nystatin (MYCOSTATIN) 886953 UNIT/GM powder Apply  topically to the appropriate area as directed 4 (Four) Times a Day.     • polyethylene glycol (MiraLax) 17 GM/SCOOP powder Take 17 g by mouth Daily.     • Sodium Phosphates (fleet enema) 7-19 GM/118ML enema Insert  into the rectum 1 (One) Time.     • sucralfate (CARAFATE) 1 g tablet Take 1 g by mouth 4 (Four) Times a Day.     • tamsulosin (FLOMAX) 0.4 MG capsule 24 hr capsule Take 1 capsule by mouth Daily.       Allergies   Allergen Reactions   • Penicillins Unknown - Low Severity       Objective    Objective     Vital Signs  Temp:  [97.3 °F (36.3 °C)-98.4 °F (36.9 °C)] 98.4 °F (36.9 °C)  Heart Rate:  [64-75] 72  Resp:  [16-20] 20  BP: (110-153)/(53-93) 112/53  SpO2:  [95 %-100 %] 95 %  on  Flow (L/min):  [2] 2;   Device (Oxygen Therapy): room air  There is no height or weight on file to calculate BMI.    Physical Exam  Vitals and nursing note reviewed.   Constitutional:       Appearance: She is obese. She is ill-appearing (chronically).   HENT:      Head: Normocephalic and  atraumatic.   Eyes:      General:         Right eye: Discharge present.   Cardiovascular:      Rate and Rhythm: Normal rate and regular rhythm.   Pulmonary:      Effort: Pulmonary effort is normal. No respiratory distress.      Breath sounds: Normal breath sounds.   Abdominal:      General: Bowel sounds are normal. There is no distension.      Palpations: Abdomen is soft.      Tenderness: There is no abdominal tenderness.   Genitourinary:     Comments: vaginal hematoma  Skin:     General: Skin is warm.      Comments: yeast in skin folds   Neurological:      Mental Status: She is alert. Mental status is at baseline. She is disoriented.         Results Review:  I reviewed the patient's new clinical results.  I reviewed the patient's new imaging results and agree with the interpretation.  I reviewed the patient's other test results and agree with the interpretation  I personally viewed and interpreted the patient's EKG/Telemetry data  Discussed with ED provider.    Lab Results (last 24 hours)     Procedure Component Value Units Date/Time    CBC & Differential [[id number]]  (Abnormal) Collected: 04/07/21 1555    Specimen: Blood Updated: 04/07/21 2126    Narrative:      The following orders were created for panel order CBC & Differential.  Procedure                               Abnormality         Status                     ---------                               -----------         ------                     CBC Auto Differential[[id number]]        Abnormal            Final result                 Please view results for these tests on the individual orders.    Protime-INR [[id number]]  (Abnormal) Collected: 04/07/21 1555    Specimen: Blood Updated: 04/07/21 2127     Protime 17.8 Seconds      INR 1.49    CBC Auto Differential [[id number]]  (Abnormal) Collected: 04/07/21 1555    Specimen: Blood Updated: 04/07/21 2126     WBC 7.51 10*3/mm3      RBC 2.30 10*6/mm3      Hemoglobin 6.2 g/dL      Hematocrit 18.9 %      MCV 82.2 fL       MCH 27.0 pg      MCHC 32.8 g/dL      RDW 13.9 %      RDW-SD 41.0 fl      MPV 8.8 fL      Platelets 404 10*3/mm3      Neutrophil % 67.8 %      Lymphocyte % 17.6 %      Monocyte % 9.9 %      Eosinophil % 4.0 %      Basophil % 0.3 %      Immature Grans % 0.4 %      Neutrophils, Absolute 5.10 10*3/mm3      Lymphocytes, Absolute 1.32 10*3/mm3      Monocytes, Absolute 0.74 10*3/mm3      Eosinophils, Absolute 0.30 10*3/mm3      Basophils, Absolute 0.02 10*3/mm3      Immature Grans, Absolute 0.03 10*3/mm3      nRBC 0.0 /100 WBC     CBC & Differential [[id number]]  (Abnormal) Collected: 04/08/21 0614    Specimen: Blood Updated: 04/08/21 0634    Narrative:      The following orders were created for panel order CBC & Differential.  Procedure                               Abnormality         Status                     ---------                               -----------         ------                     CBC Auto Differential[[id number]]        Abnormal            Final result                 Please view results for these tests on the individual orders.    Comprehensive Metabolic Panel [[id number]]  (Abnormal) Collected: 04/08/21 0614    Specimen: Blood Updated: 04/08/21 0641     Glucose 84 mg/dL      BUN 9 mg/dL      Creatinine 0.50 mg/dL      Sodium 115 mmol/L      Potassium 4.9 mmol/L      Chloride 80 mmol/L      CO2 28.7 mmol/L      Calcium 8.1 mg/dL      Total Protein 5.4 g/dL      Albumin 3.10 g/dL      ALT (SGPT) 26 U/L      AST (SGOT) 47 U/L      Alkaline Phosphatase 66 U/L      Total Bilirubin 2.4 mg/dL      eGFR Non African Amer 116 mL/min/1.73      Globulin 2.3 gm/dL      A/G Ratio 1.3 g/dL      BUN/Creatinine Ratio 18.0     Anion Gap 6.3 mmol/L     Narrative:      GFR Normal >60  Chronic Kidney Disease <60  Kidney Failure <15      Protime-INR [[id number]]  (Abnormal) Collected: 04/08/21 0614    Specimen: Blood Updated: 04/08/21 0647     Protime 17.5 Seconds      INR 1.46    aPTT [[id number]]  (Abnormal) Collected:  04/08/21 0614    Specimen: Blood Updated: 04/08/21 0647     PTT 52.3 seconds     CBC Auto Differential [[id number]]  (Abnormal) Collected: 04/08/21 0614    Specimen: Blood Updated: 04/08/21 0634     WBC 6.83 10*3/mm3      RBC 2.34 10*6/mm3      Hemoglobin 6.5 g/dL      Hematocrit 19.4 %      MCV 82.9 fL      MCH 27.8 pg      MCHC 33.5 g/dL      RDW 14.0 %      RDW-SD 41.6 fl      MPV 8.5 fL      Platelets 404 10*3/mm3      Neutrophil % 65.4 %      Lymphocyte % 19.3 %      Monocyte % 10.2 %      Eosinophil % 4.1 %      Basophil % 0.4 %      Immature Grans % 0.6 %      Neutrophils, Absolute 4.46 10*3/mm3      Lymphocytes, Absolute 1.32 10*3/mm3      Monocytes, Absolute 0.70 10*3/mm3      Eosinophils, Absolute 0.28 10*3/mm3      Basophils, Absolute 0.03 10*3/mm3      Immature Grans, Absolute 0.04 10*3/mm3      nRBC 0.0 /100 WBC     Urinalysis With Culture If Indicated - Urine, Catheter [[id number]]  (Abnormal) Collected: 04/08/21 0730    Specimen: Urine, Catheter Updated: 04/08/21 0743     Color, UA Yellow     Appearance, UA Turbid     pH, UA 6.0     Specific Gravity, UA 1.025     Glucose, UA Negative     Ketones, UA Negative     Bilirubin, UA Small (1+)     Comment: Unable to confirm due to urine volume less than 2 ccs.        Blood, UA Moderate (2+)     Protein,  mg/dL (2+)     Leuk Esterase, UA Small (1+)     Nitrite, UA Positive     Urobilinogen, UA 1.0 E.U./dL    Urinalysis, Microscopic Only - Urine, Catheter [[id number]]  (Abnormal) Collected: 04/08/21 0730    Specimen: Urine, Catheter Updated: 04/08/21 0746     RBC, UA       Unable to determine due to loaded field     /HPF     WBC, UA Too Numerous to Count /HPF      Bacteria, UA       Unable to determine due to loaded field     /HPF     Squamous Epithelial Cells, UA       Unable to determine due to loaded field     /HPF     Hyaline Casts, UA       Unable to determine due to loaded field     /LPF     Methodology Manual Light Microscopy    Narrative:       Microscopic performed on un spun specimen. Urine volume less than 2 cc    Urine Culture - Urine, Urine, Catheter [[id number]] Collected: 04/08/21 0730    Specimen: Urine, Catheter Updated: 04/08/21 0746    COVID PRE-OP / PRE-PROCEDURE SCREENING ORDER (NO ISOLATION) - Swab, Nasopharynx [[id number]] Collected: 04/08/21 0931    Specimen: Swab from Nasopharynx Updated: 04/08/21 1002    Narrative:      The following orders were created for panel order COVID PRE-OP / PRE-PROCEDURE SCREENING ORDER (NO ISOLATION) - Swab, Nasopharynx.  Procedure                               Abnormality         Status                     ---------                               -----------         ------                     COVID-19,APTIMA PANTHER,...[[id number]]                      In process                   Please view results for these tests on the individual orders.    COVID-19,APTIMA PANTHER,RU IN-HOUSE, NP/OP SWAB IN UTM/VTM/SALINE TRANSPORT MEDIA,24 HR TAT - Swab, Nasopharynx [[id number]] Collected: 04/08/21 0931    Specimen: Swab from Nasopharynx Updated: 04/08/21 1002          Imaging Results (Last 24 Hours)     ** No results found for the last 24 hours. **              No orders to display        Assessment/Plan     Active Hospital Problems    Diagnosis  POA   • **Hyponatremia [E87.1]  Yes   • UTI (urinary tract infection), bacterial [N39.0, A49.9]  Yes   • Conjunctivitis [H10.9]  Yes   • Anemia [D64.9]  Yes   • Depression [F32.9]  Yes   • Chronic constipation [K59.09]  Yes   • Do not intubate but use all other measures [Z78.9]  Yes   • Vaginal hematoma [N89.8]  Yes   • Dementia (CMS/HCC) [F03.90]  Unknown      Resolved Hospital Problems   No resolved problems to display.       · anemia: receiving 2 units PRBCs, trend HH q8. check CT abdomen and pelvis given vaginal/left leg hematoma as could likely be the source of blood loss.   · hyponatremia: repeat lab, IVFs- careful not to correct too fast, check TSH and urine studies. will ask  nephrology for assistance.   · UTI: await urine culture and continue rocephin for now  · conjunctivitis: polytrim   · please notify when med rec is complete   · further recommendations as clinical course unfolds   · I discussed the patient's findings and my recommendations with patient and nursing staff.    VTE Prophylaxis - SCDs.  Code Status - Limited code - okay for CPR, NO intubation.       CALEB Breen  [facility]  04/08/21  12:25 EDT

## 2021-04-08 NOTE — THERAPY EVALUATION
Acute Care - Speech Language Pathology   Swallow Initial Evaluation [facility]     Patient Name: Eduarda Lopez  : 1931  MRN: [id number]  Today's Date: 2021               Admit Date: 2021    Visit Dx:     ICD-10-CM ICD-9-CM   1. Hyponatremia  E87.1 276.1   2. Acute anemia  D64.9 285.9   3. Urinary tract infection associated with indwelling urethral catheter, initial encounter (CMS/[facility])  T83.511A 996.64    N39.0 599.0   4. Traumatic ecchymosis of left thigh, initial encounter  S70.12XA 924.00     Patient Active Problem List   Diagnosis   • Hyponatremia   • UTI (urinary tract infection), bacterial   • Conjunctivitis   • Anemia   • Depression   • Chronic constipation   • Do not intubate but use all other measures   • Vaginal hematoma   • Dementia (CMS/[facility])     No past medical history on file.  No past surgical history on file.     SWALLOW EVALUATION (last 72 hours)      SLP Adult Swallow Evaluation     Row Name 21 1300                   Rehab Evaluation    Document Type  evaluation  -SA        Subjective Information  complains of being hungry  -SA        Patient Observations  alert;cooperative  -SA        Patient Effort  good  -SA        Symptoms Noted During/After Treatment  none  -SA           General Information    Patient Profile Reviewed  yes  -SA        Pertinent History Of Current Problem  UTI, dementia,   -SA        Current Method of Nutrition  pureed with some mashed;thin liquids  -SA        Precautions/Limitations, Vision  WFL;for purposes of eval  -SA        Precautions/Limitations, Hearing  hearing impairment, bilaterally  -SA        Prior Level of Function-Communication  unknown  -SA        Prior Level of Function-Swallowing  unknown  -SA        Plans/Goals Discussed with  patient;agreed upon  -SA        Barriers to Rehab  cognitive status  -SA        Patient's Goals for Discharge  return to PO diet  -SA           Pain    Additional Documentation  Pain Scale: Numbers  Pre/Post-Treatment (Group)  -SA           Pain Scale: Numbers Pre/Post-Treatment    Pretreatment Pain Rating  0/10 - no pain  -SA        Posttreatment Pain Rating  0/10 - no pain  -SA           Oral Motor Structure and Function    Dentition Assessment  edentulous;edentulous, dentures not available  -SA        Secretion Management  WNL/WFL  -SA           Clinical Swallow Eval    Oral Prep Phase  impaired  -SA        Oral Transit  WFL  -SA        Oral Residue  WFL  -SA        Pharyngeal Phase  no overt signs/symptoms of pharyngeal impairment  -SA        Esophageal Phase  unremarkable  -SA           Oral Prep Concerns    Oral Prep Concerns  inefficient mastication  -SA        Inefficient Mastication  mechanical soft  -SA           Clinical Impression    SLP Swallowing Diagnosis  oral dysphagia;other (see comments) d/t lack/dentition  -SA        Functional Impact  risk of aspiration/pneumonia  -SA        Rehab Potential/Prognosis, Swallowing  good, to achieve stated therapy goals  -SA        Swallow Criteria for Skilled Therapeutic Interventions Met  demonstrates skilled criteria  -SA           Recommendations    Therapy Frequency (Swallow)  PRN  -SA        Predicted Duration Therapy Intervention (Days)  until discharge  -SA        SLP Diet Recommendation  puree with some mashed;thin liquids  -SA        Recommended Precautions and Strategies  upright posture during/after eating;small bites of food and sips of liquid  -SA        Oral Care Recommendations  Oral Care BID/PRN  -SA        SLP Rec. for Method of Medication Administration  as tolerated  -SA        Anticipated Discharge Disposition (SLP)  unknown  -SA           Swallow Goals (SLP)    Oral Nutrition/Hydration Goal Selection (SLP)  oral nutrition/hydration, SLP goal 1  -SA           Oral Nutrition/Hydration Goal 1 (SLP)    Oral Nutrition/Hydration Goal 1, SLP  Pt will tolerate least restrictive diet  -SA        Time Frame (Oral Nutrition/Hydration Goal 1, SLP)   by discharge  -          User Key  (r) = Recorded By, (t) = Taken By, (c) = Cosigned By    Initials Name Effective Dates    Hafsa Cole MS [facility]3]-SLP 03/07/18 -           EDUCATION  The patient has been educated in the following areas:   Dysphagia (Swallowing Impairment) Modified Diet Instruction.    SLP Recommendation and Plan  SLP Swallowing Diagnosis: oral dysphagia, other (see comments) (d/t lack/dentition)  SLP Diet Recommendation: puree with some mashed, thin liquids  Recommended Precautions and Strategies: upright posture during/after eating, small bites of food and sips of liquid  SLP Rec. for Method of Medication Administration: as tolerated           Swallow Criteria for Skilled Therapeutic Interventions Met: demonstrates skilled criteria  Anticipated Discharge Disposition (SLP): unknown  Rehab Potential/Prognosis, Swallowing: good, to achieve stated therapy goals  Therapy Frequency (Swallow): PRN  Predicted Duration Therapy Intervention (Days): until discharge                         Plan of Care Reviewed With: patient  Outcome Summary: Orders received.  Pt demonstrated no overt s/s aspiration with thin by cup/straw, puree, fork mashed.  Pt acknowledged difficulty with and incomplete mastication of soft solids w/o teeth.  REC fork mashed diet with thin liquids.  Meds as tolerated.  Upright with all PO.  Once dentures arrive and are in place may upgrade.    SLP GOALS     Row Name 04/08/21 1300             Oral Nutrition/Hydration Goal 1 (SLP)    Oral Nutrition/Hydration Goal 1, SLP  Pt will tolerate least restrictive diet  -      Time Frame (Oral Nutrition/Hydration Goal 1, SLP)  by discharge  -        User Key  (r) = Recorded By, (t) = Taken By, (c) = Cosigned By    Initials Name Provider Type    Hafsa Cole MS CCC-SLP Speech and Language Pathologist           SLP Outcome Measures (last 72 hours)      SLP Outcome Measures     Row Name 04/08/21 1300             SLP Outcome Measures     Outcome Measure Used?  Adult NOMS  -         Adult FCM Scores    FCM Chosen  Swallowing  -      Swallowing FCM Score  4  -        User Key  (r) = Recorded By, (t) = Taken By, (c) = Cosigned By    Initials Name Effective Dates    Hafsa Cole MS CCC-SLP 03/07/18 -            Time Calculation:   Time Calculation- SLP     Row Name 04/08/21 1338             Time Calculation- SLP    SLP Start Time  1245  -      SLP Received On  04/08/21  -        User Key  (r) = Recorded By, (t) = Taken By, (c) = Cosigned By    Initials Name Provider Type    Hafsa Cole MS CCC-SLP Speech and Language Pathologist          Therapy Charges for Today     Code Description Service Date Service Provider Modifiers Qty    [id number]  ST EVAL ORAL PHARYNG SWALLOW 4 4/8/2021 Hafsa Moncada MS CCC-SLP GN 1               Hafsa Moncada MS CCC-BALA  4/8/2021

## 2021-04-09 PROBLEM — Z51.5 ADMISSION FOR HOSPICE CARE: Status: ACTIVE | Noted: 2021-01-01

## 2021-04-09 NOTE — PLAN OF CARE
Problem: Adult Inpatient Plan of Care  Goal: Patient-Specific Goal (Individualized)  Outcome: Ongoing, Progressing  Flowsheets (Taken 4/9/2021 1746)  Individualized Care Needs: Pre-medicated prior to turns. Turned q4hrs for comfort. Fang cath.  Anxieties, Fears or Concerns: Support given to family at bedside.   Goal Outcome Evaluation:        Outcome Summary: WOCN eval today for Lt heel and buttocks. Eye drops for conjunctivitis. F/C. Turned q4 hrs. Pre-medicated prior to turns. Excoriation under breasts/buttocks. Anti-fungal powder.

## 2021-04-09 NOTE — PROGRESS NOTES
[facility] Visit Report    Eduarda Lopez  [id number]  4/9/2021    Admission R/T [facility] Dx: Yes    [facility] Ad[facility]: COPD/CHF    Symptom  Management: Pain/Dyspnea    Nursing/Medication Recommendations: Monitor for condition changes    Psychosocial Issues and Recommendations:    Spiritual Concerns and Recommendations:    [facility] Discharge Plans:  None.  Pt meets GIP criteria and is unsafe for transfer with [facility] to follow pt daily.    Review of Visit :  Pt is a pps of 10% and is nonresponsive with shallow nonlabored respirations and apnea present.  Pt is very ashen with dark diminished UOP to bsd bag.  Pt requires GIP level care for management of pain/dyspnea and requires in past 24 hrs Dilaudid 1.5 mg IV x2, Dilaudid 1 mg IV x2 and Ativan 0.5 mg IV x2.  Family is in room and coping tearfully during visit with [facility] RN collaborating with them about pt current condition and being in active dying process.  Plan remains comfort care at this time.  [facility] RN collaborated with Tala COLEMAN regarding pt current condition and she verbalizes understanding of pt goal being comfort with [facility] to follow pt daily.        Angel Hirsch, RN

## 2021-04-09 NOTE — PROGRESS NOTES
Name: Eduarda Lopez ADMIT: 2021   : 1931  PCP: Brock Rich MD    MRN: [id number] LOS: 1 days   AGE/SEX: 89 y.o. female  ROOM: [facility]     Subjective   Subjective   CC: hospice care  No acute events. Patient has rested most of the day. She has had multiple family visitors. Medicated on multiple occasions even with turns.  Objective   Objective   Vital Signs  Temp:  [98.4 °F (36.9 °C)-99.7 °F (37.6 °C)] 99.7 °F (37.6 °C)  Heart Rate:  [82-87] 82  Resp:  [18] 18  BP: ()/(56-65) 80/56  SpO2:  [88 %-92 %] 88 %  on   ;   Device (Oxygen Therapy): room air  There is no height or weight on file to calculate BMI.  Physical Exam  Vitals and nursing note reviewed.   Constitutional:       General: She is not in acute distress.     Appearance: She is ill-appearing.   HENT:      Head: Normocephalic and atraumatic.      Nose: Nose normal.      Mouth/Throat:      Mouth: Mucous membranes are moist.      Pharynx: Oropharynx is clear.   Cardiovascular:      Rate and Rhythm: Normal rate and regular rhythm.      Pulses: Normal pulses.   Pulmonary:      Effort: Pulmonary effort is normal.      Breath sounds: Examination of the right-lower field reveals decreased breath sounds. Examination of the left-lower field reveals decreased breath sounds. Decreased breath sounds present.   Abdominal:      General: Bowel sounds are normal.      Palpations: Abdomen is soft.   Genitourinary:     Comments: Fang catheter in place  Musculoskeletal:         General: Swelling present. No tenderness.      Cervical back: Neck supple. No tenderness.   Skin:     General: Skin is warm and dry.   Neurological:      General: No focal deficit present.      Mental Status: She is lethargic.      Motor: No abnormal muscle tone.       Results Review     I reviewed the patient's new clinical results.  Results from last 7 days   Lab Units 21  0614 21  1555   WBC 10*3/mm3 6.83 7.51   HEMOGLOBIN g/dL 6.5* 6.2*   PLATELETS  10*3/mm3 404 404     Results from last 7 days   Lab Units 04/08/21  1327 04/08/21  0614   SODIUM mmol/L 116* 115*   POTASSIUM mmol/L  --  4.9   CHLORIDE mmol/L  --  80*   CO2 mmol/L  --  28.7   BUN mg/dL  --  9   CREATININE mg/dL  --  0.50*   GLUCOSE mg/dL  --  84   CrCl cannot be calculated (Unknown ideal weight.).  Results from last 7 days   Lab Units 04/08/21  0614   ALBUMIN g/dL 3.10*   BILIRUBIN mg/dL 2.4*   ALK PHOS U/L 66   AST (SGOT) U/L 47*   ALT (SGPT) U/L 26     Results from last 7 days   Lab Units 04/08/21  0614   CALCIUM mg/dL 8.1*   ALBUMIN g/dL 3.10*       COVID19   Date Value Ref Range Status   04/08/2021 Not Detected Not Detected - Ref. Range Final     No results found for: HGBA1C, POCGLU    No image results found.    Scheduled Medications  castor oil-balsam peru, , Topical, Q12H  nystatin, , Topical, Q12H  sodium chloride, 10 mL, Intravenous, Q12H  trimethoprim-polymyxin b, 1 drop, Right Eye, Q6H    Infusions   Diet  NPO Diet       Assessment/Plan     Active Hospital Problems    Diagnosis  POA   • **Admission for hospice care [Z51.5]  Not Applicable   • Hyponatremia [E87.1]  Yes   • UTI (urinary tract infection), bacterial [N39.0, A49.9]  Yes   • Conjunctivitis [H10.9]  Yes   • Anemia [D64.9]  Yes   • Depression [F32.9]  Yes   • Chronic constipation [K59.09]  Yes   • Do not intubate but use all other measures [Z78.9]  Yes   • Vaginal hematoma [N89.8]  Yes   • Dementia (CMS/HCC) [F03.90]  Yes      Resolved Hospital Problems   No resolved problems to display.     Admitted to Hospice Scattered Bed. Continue comfort measures.     Bandar Castro MD  [facility]  04/09/21  18:27 EDT

## 2021-04-09 NOTE — PROGRESS NOTES
[facility]    Eduarda Lopez  [id number]  4/9/2021        Review of Visit (Include All Collaboration- including names of hospital and family involved during admission/visit):  SBT MSW completed follow up visit for emotional support. Pt has moved to 4. Pt unresponsive, and appeared to have rapid decline. Granddaughter, Mariia, at bedside. Mariia extremely tearful. Expressed feelings related to pt's imminent death. Mariia and family have experienced multiple losses, including a cousin overdosing this week. Provided supportive presence and encouraged expression of grief. Mariia requesting additional information on treatment plan and medical condition. Invited SBT RN back to room for additional education.  Also advised facility and [facility] following up on bruising pt presented to hospital with. Did not go into detail of investigation due to Mariia not being DPOA. Mariia states has a grief counselor currently. Reviewed [facility] bereavement services. Plan at this time is for pt to remain in [place] in hospital for symptom management.         SIMI Godoy

## 2021-04-09 NOTE — NURSING NOTE
CWOCN consult for coccyx stage 1 and left heel.  Patient with sacral skin non blanchable with MASD and some mild scuffing on bilateral buttocks.  DTI to left heel with maroon purple discoloration and hard yellow area to upper center.  Recommend use of venelex to both areas and continue to off load/ turn patient.

## 2021-04-09 NOTE — CONSULTS
I spoke to the patient's daughter. Also, her granddaughters were also there to visit as well. The family spoke about the patient and how she loved her family and about her children that have passed on before her. That was her motivation and her support. She is not a part of a [employer]. The family's desired outcome is to have her die peacefully. We prayed together and I left the family so they could spend time together.

## 2021-04-09 NOTE — PLAN OF CARE
Goal Outcome Evaluation:  Plan of Care Reviewed With: patient     Outcome Summary: Pt had no signs of distress. Complaints of discomfort and pain when moving. Will continue plan of care.

## 2021-04-09 NOTE — PROGRESS NOTES
Discharge Planning Assessment  [facility]     Patient Name: Eduarda Lopez  MRN: [id number]  Today's Date: 4/9/2021    Admit Date: 4/8/2021    Discharge Needs Assessment    No documentation.       Discharge Plan     Row Name 04/09/21 1450       Plan    Plan Comments  The patient was transferred to [facility] from [facility]. The patient is palliative. [facility] was current with the patient at [facility]. [facility] and the [facility] team is following for any needs that may arise. APS filed report/[facility]. ISABEL Tsang Rn, [facility]        Continued Care and Services - Admitted Since 4/8/2021    Coordination has not been started for this encounter.       Expected Discharge Date and Time     Expected Discharge Date Expected Discharge Time    Apr 8, 2021         Demographic Summary    No documentation.       Functional Status    No documentation.       Psychosocial    No documentation.       Abuse/Neglect    No documentation.       Legal    No documentation.       Substance Abuse    No documentation.       Patient Forms    No documentation.           Gracie Tsang RN

## 2021-04-12 NOTE — PROGRESS NOTES
Case Management Discharge Note      Final Note: The patient  on 4/10/21 @ 01:33. BJames Tsang RN, CCP         Selected Continued Care - Discharged on 4/10/2021 Admission date: 2021 - Discharge disposition:     Destination Coordination complete    Service Provider Selected Services Address Phone Fax Patient Preferred    [facility] [address] [facility] [address] [phone] --          Durable Medical Equipment    No services have been selected for the patient.              Dialysis/Infusion    No services have been selected for the patient.              Home Medical Care    No services have been selected for the patient.              Therapy    No services have been selected for the patient.              Community Resources    No services have been selected for the patient.                Selected Continued Care - Prior Encounters Includes selections from prior encounters from 2021 to 4/10/2021    Discharged on 2021 Admission date: 2020 - Discharge disposition: Hospice/Home    Home Medical Care     Service Provider Selected Services Address Phone Fax Patient Preferred    [facility]  Home Health Services [address] [place] [address] [phone] --                         Final Discharge Disposition Code: 41 -  in medical facility
